# Patient Record
Sex: MALE | Race: WHITE | NOT HISPANIC OR LATINO | Employment: OTHER | URBAN - METROPOLITAN AREA
[De-identification: names, ages, dates, MRNs, and addresses within clinical notes are randomized per-mention and may not be internally consistent; named-entity substitution may affect disease eponyms.]

---

## 2019-11-11 ENCOUNTER — TRANSCRIBE ORDERS (OUTPATIENT)
Dept: ADMINISTRATIVE | Facility: HOSPITAL | Age: 84
End: 2019-11-11

## 2019-11-11 DIAGNOSIS — C34.90 MALIGNANT NEOPLASM OF LUNG, UNSPECIFIED LATERALITY, UNSPECIFIED PART OF LUNG (HCC): Primary | ICD-10-CM

## 2019-11-19 ENCOUNTER — HOSPITAL ENCOUNTER (OUTPATIENT)
Dept: RADIOLOGY | Age: 84
Discharge: HOME | End: 2019-11-19
Payer: MEDICARE

## 2019-11-19 DIAGNOSIS — C77.9 METASTATIC MALIGNANT NEOPLASM TO REGIONAL LYMPH NODE (HCC): ICD-10-CM

## 2019-11-19 DIAGNOSIS — D38.1 NEOPLASM OF UNCERTAIN BEHAVIOR OF RIGHT UPPER LOBE OF LUNG: ICD-10-CM

## 2019-11-19 LAB — GLUCOSE SERPL-MCNC: 90 MG/DL (ref 65–140)

## 2019-11-19 PROCEDURE — A9552 F18 FDG: HCPCS

## 2019-11-19 PROCEDURE — 78815 PET IMAGE W/CT SKULL-THIGH: CPT

## 2019-11-19 PROCEDURE — 82948 REAGENT STRIP/BLOOD GLUCOSE: CPT

## 2020-01-01 ENCOUNTER — APPOINTMENT (EMERGENCY)
Dept: CT IMAGING | Facility: HOSPITAL | Age: 85
End: 2020-01-01
Payer: MEDICARE

## 2020-01-01 ENCOUNTER — TELEPHONE (OUTPATIENT)
Dept: ADMINISTRATIVE | Facility: HOSPITAL | Age: 85
End: 2020-01-01

## 2020-01-01 ENCOUNTER — OFFICE VISIT (OUTPATIENT)
Dept: VASCULAR SURGERY | Facility: CLINIC | Age: 85
End: 2020-01-01
Payer: MEDICARE

## 2020-01-01 ENCOUNTER — ANESTHESIA (OUTPATIENT)
Dept: PERIOP | Facility: HOSPITAL | Age: 85
End: 2020-01-01
Payer: MEDICARE

## 2020-01-01 ENCOUNTER — HOSPITAL ENCOUNTER (OUTPATIENT)
Facility: HOSPITAL | Age: 85
Setting detail: OUTPATIENT SURGERY
Discharge: HOME | End: 2020-11-04
Attending: SURGERY | Admitting: SURGERY
Payer: MEDICARE

## 2020-01-01 ENCOUNTER — HOSPITAL ENCOUNTER (OUTPATIENT)
Dept: NON INVASIVE DIAGNOSTICS | Facility: CLINIC | Age: 85
Discharge: HOME | End: 2020-10-21
Payer: MEDICARE

## 2020-01-01 ENCOUNTER — TELEPHONE (OUTPATIENT)
Dept: UROLOGY | Facility: MEDICAL CENTER | Age: 85
End: 2020-01-01

## 2020-01-01 ENCOUNTER — PREP FOR PROCEDURE (OUTPATIENT)
Dept: VASCULAR SURGERY | Facility: CLINIC | Age: 85
End: 2020-01-01

## 2020-01-01 ENCOUNTER — TELEPHONE (OUTPATIENT)
Dept: VASCULAR SURGERY | Facility: CLINIC | Age: 85
End: 2020-01-01

## 2020-01-01 ENCOUNTER — HOSPITAL ENCOUNTER (OUTPATIENT)
Dept: VASCULAR ULTRASOUND | Facility: HOSPITAL | Age: 85
Discharge: HOME | End: 2020-12-22
Payer: MEDICARE

## 2020-01-01 ENCOUNTER — ANESTHESIA EVENT (OUTPATIENT)
Dept: PERIOP | Facility: HOSPITAL | Age: 85
End: 2020-01-01
Payer: MEDICARE

## 2020-01-01 ENCOUNTER — APPOINTMENT (OUTPATIENT)
Dept: LAB | Facility: CLINIC | Age: 85
End: 2020-01-01
Payer: MEDICARE

## 2020-01-01 ENCOUNTER — HOSPITAL ENCOUNTER (EMERGENCY)
Facility: HOSPITAL | Age: 85
Discharge: HOME | End: 2020-12-27
Attending: EMERGENCY MEDICINE
Payer: MEDICARE

## 2020-01-01 ENCOUNTER — APPOINTMENT (OUTPATIENT)
Dept: RADIOLOGY | Facility: HOSPITAL | Age: 85
End: 2020-01-01
Payer: MEDICARE

## 2020-01-01 VITALS
DIASTOLIC BLOOD PRESSURE: 81 MMHG | SYSTOLIC BLOOD PRESSURE: 132 MMHG | RESPIRATION RATE: 18 BRPM | OXYGEN SATURATION: 100 % | HEART RATE: 68 BPM | TEMPERATURE: 97.4 F | BODY MASS INDEX: 24.42 KG/M2 | WEIGHT: 165.34 LBS

## 2020-01-01 VITALS
BODY MASS INDEX: 24.29 KG/M2 | SYSTOLIC BLOOD PRESSURE: 138 MMHG | OXYGEN SATURATION: 99 % | WEIGHT: 164 LBS | DIASTOLIC BLOOD PRESSURE: 78 MMHG | HEART RATE: 75 BPM | RESPIRATION RATE: 16 BRPM | TEMPERATURE: 98 F | HEIGHT: 69 IN

## 2020-01-01 VITALS — HEART RATE: 56 BPM

## 2020-01-01 VITALS
BODY MASS INDEX: 24.44 KG/M2 | SYSTOLIC BLOOD PRESSURE: 158 MMHG | HEIGHT: 69 IN | RESPIRATION RATE: 18 BRPM | TEMPERATURE: 97.8 F | HEART RATE: 80 BPM | DIASTOLIC BLOOD PRESSURE: 90 MMHG | WEIGHT: 165 LBS

## 2020-01-01 VITALS
BODY MASS INDEX: 24.59 KG/M2 | HEIGHT: 69 IN | RESPIRATION RATE: 18 BRPM | HEART RATE: 69 BPM | DIASTOLIC BLOOD PRESSURE: 92 MMHG | TEMPERATURE: 98.5 F | WEIGHT: 166 LBS | SYSTOLIC BLOOD PRESSURE: 140 MMHG

## 2020-01-01 VITALS
DIASTOLIC BLOOD PRESSURE: 103 MMHG | BODY MASS INDEX: 23.7 KG/M2 | HEIGHT: 69 IN | HEART RATE: 64 BPM | TEMPERATURE: 97.5 F | RESPIRATION RATE: 18 BRPM | OXYGEN SATURATION: 98 % | WEIGHT: 160 LBS | SYSTOLIC BLOOD PRESSURE: 164 MMHG

## 2020-01-01 DIAGNOSIS — I70.213 ATHEROSCLEROSIS OF NATIVE ARTERY OF BOTH LOWER EXTREMITIES WITH INTERMITTENT CLAUDICATION (HCC): Primary | ICD-10-CM

## 2020-01-01 DIAGNOSIS — I65.23 ASYMPTOMATIC BILATERAL CAROTID ARTERY STENOSIS: ICD-10-CM

## 2020-01-01 DIAGNOSIS — I71.4 ABDOMINAL AORTIC ANEURYSM (AAA) WITHOUT RUPTURE (HCC): ICD-10-CM

## 2020-01-01 DIAGNOSIS — I87.2 VENOUS INSUFFICIENCY: ICD-10-CM

## 2020-01-01 DIAGNOSIS — Z46.6 ENCOUNTER FOR URINARY CATHETER: ICD-10-CM

## 2020-01-01 DIAGNOSIS — E78.2 MIXED HYPERLIPIDEMIA: ICD-10-CM

## 2020-01-01 DIAGNOSIS — R31.0 GROSS HEMATURIA: Primary | ICD-10-CM

## 2020-01-01 DIAGNOSIS — I48.21 PERMANENT ATRIAL FIBRILLATION (HCC): ICD-10-CM

## 2020-01-01 DIAGNOSIS — I70.213 ATHEROSCLEROSIS OF NATIVE ARTERY OF BOTH LOWER EXTREMITIES WITH INTERMITTENT CLAUDICATION (HCC): ICD-10-CM

## 2020-01-01 DIAGNOSIS — I48.21 PERMANENT ATRIAL FIBRILLATION (HCC): Primary | ICD-10-CM

## 2020-01-01 DIAGNOSIS — R33.9 URINARY RETENTION: ICD-10-CM

## 2020-01-01 DIAGNOSIS — R31.9 HEMATURIA: Primary | ICD-10-CM

## 2020-01-01 DIAGNOSIS — N32.89 BLADDER DISTENSION: ICD-10-CM

## 2020-01-01 LAB
ALBUMIN SERPL BCP-MCNC: 3.6 G/DL (ref 3.5–5)
ALP SERPL-CCNC: 78 U/L (ref 46–116)
ALT SERPL W P-5'-P-CCNC: 23 U/L (ref 12–78)
ANION GAP SERPL CALCULATED.3IONS-SCNC: 7 MMOL/L (ref 4–13)
ANION GAP SERPL CALCULATED.3IONS-SCNC: 8 MMOL/L (ref 4–13)
APTT PPP: 50 SECONDS (ref 23–37)
AST SERPL W P-5'-P-CCNC: 21 U/L (ref 5–45)
BACTERIA UR QL AUTO: ABNORMAL /HPF
BASOPHILS # BLD AUTO: 0.05 THOUSANDS/ΜL (ref 0–0.1)
BASOPHILS NFR BLD AUTO: 1 % (ref 0–1)
BILIRUB SERPL-MCNC: 0.52 MG/DL (ref 0.2–1)
BILIRUB UR QL STRIP: ABNORMAL
BILIRUB UR QL STRIP: ABNORMAL
BUN SERPL-MCNC: 18 MG/DL (ref 5–25)
BUN SERPL-MCNC: 24 MG/DL (ref 5–25)
CALCIUM SERPL-MCNC: 9.1 MG/DL (ref 8.3–10.1)
CALCIUM SERPL-MCNC: 9.1 MG/DL (ref 8.3–10.1)
CHLORIDE SERPL-SCNC: 102 MMOL/L (ref 100–108)
CHLORIDE SERPL-SCNC: 104 MMOL/L (ref 100–108)
CK MB SERPL-MCNC: 2.5 % (ref 0–2.5)
CK MB SERPL-MCNC: 5.5 NG/ML (ref 0–5)
CK SERPL-CCNC: 218 U/L (ref 39–308)
CLARITY UR: ABNORMAL
CLARITY UR: CLEAR
CO2 SERPL-SCNC: 27 MMOL/L (ref 21–32)
CO2 SERPL-SCNC: 28 MMOL/L (ref 21–32)
COLOR UR: ABNORMAL
COLOR UR: YELLOW
CREAT SERPL-MCNC: 0.96 MG/DL (ref 0.6–1.3)
CREAT SERPL-MCNC: 1.1 MG/DL (ref 0.6–1.3)
EOSINOPHIL # BLD AUTO: 0.16 THOUSAND/ΜL (ref 0–0.61)
EOSINOPHIL NFR BLD AUTO: 3 % (ref 0–6)
ERYTHROCYTE [DISTWIDTH] IN BLOOD BY AUTOMATED COUNT: 13.6 % (ref 11.6–15.1)
ERYTHROCYTE [DISTWIDTH] IN BLOOD BY AUTOMATED COUNT: 14.3 % (ref 11.6–15.1)
GFR SERPL CREATININE-BSD FRML MDRD: 60 ML/MIN/1.73SQ M
GFR SERPL CREATININE-BSD FRML MDRD: 72 ML/MIN/1.73SQ M
GLUCOSE SERPL-MCNC: 100 MG/DL (ref 65–140)
GLUCOSE SERPL-MCNC: 123 MG/DL (ref 65–140)
GLUCOSE SERPL-MCNC: 55 MG/DL (ref 65–140)
GLUCOSE SERPL-MCNC: 96 MG/DL (ref 65–140)
GLUCOSE UR STRIP-MCNC: NEGATIVE MG/DL
GLUCOSE UR STRIP-MCNC: NEGATIVE MG/DL
HCT VFR BLD AUTO: 36.4 % (ref 36.5–49.3)
HCT VFR BLD AUTO: 38.5 % (ref 36.5–49.3)
HGB BLD-MCNC: 12.3 G/DL (ref 12–17)
HGB BLD-MCNC: 12.9 G/DL (ref 12–17)
HGB UR QL STRIP.AUTO: ABNORMAL
HGB UR QL STRIP.AUTO: ABNORMAL
IMM GRANULOCYTES # BLD AUTO: 0.01 THOUSAND/UL (ref 0–0.2)
IMM GRANULOCYTES NFR BLD AUTO: 0 % (ref 0–2)
INR PPP: 1.19 (ref 0.84–1.19)
KETONES UR STRIP-MCNC: NEGATIVE MG/DL
KETONES UR STRIP-MCNC: NEGATIVE MG/DL
LEUKOCYTE ESTERASE UR QL STRIP: NEGATIVE
LEUKOCYTE ESTERASE UR QL STRIP: NEGATIVE
LIPASE SERPL-CCNC: 132 U/L (ref 73–393)
LYMPHOCYTES # BLD AUTO: 1.41 THOUSANDS/ΜL (ref 0.6–4.47)
LYMPHOCYTES NFR BLD AUTO: 22 % (ref 14–44)
MCH RBC QN AUTO: 33.1 PG (ref 26.8–34.3)
MCH RBC QN AUTO: 33.5 PG (ref 26.8–34.3)
MCHC RBC AUTO-ENTMCNC: 33.5 G/DL (ref 31.4–37.4)
MCHC RBC AUTO-ENTMCNC: 33.8 G/DL (ref 31.4–37.4)
MCV RBC AUTO: 100 FL (ref 82–98)
MCV RBC AUTO: 98 FL (ref 82–98)
MONOCYTES # BLD AUTO: 0.64 THOUSAND/ΜL (ref 0.17–1.22)
MONOCYTES NFR BLD AUTO: 10 % (ref 4–12)
NEUTROPHILS # BLD AUTO: 4.1 THOUSANDS/ΜL (ref 1.85–7.62)
NEUTS SEG NFR BLD AUTO: 64 % (ref 43–75)
NITRITE UR QL STRIP: NEGATIVE
NITRITE UR QL STRIP: NEGATIVE
NON-SQ EPI CELLS URNS QL MICRO: ABNORMAL /HPF
NRBC BLD AUTO-RTO: 0 /100 WBCS
PH UR STRIP.AUTO: 7.5 [PH] (ref 4.5–8)
PH UR STRIP.AUTO: 7.5 [PH] (ref 4.5–8)
PLATELET # BLD AUTO: 136 THOUSANDS/UL (ref 149–390)
PLATELET # BLD AUTO: 222 THOUSANDS/UL (ref 149–390)
PMV BLD AUTO: 10.5 FL (ref 8.9–12.7)
PMV BLD AUTO: 9.5 FL (ref 8.9–12.7)
POTASSIUM SERPL-SCNC: 4.1 MMOL/L (ref 3.5–5.3)
POTASSIUM SERPL-SCNC: 4.1 MMOL/L (ref 3.5–5.3)
PROT SERPL-MCNC: 6.9 G/DL (ref 6.4–8.2)
PROT UR STRIP-MCNC: ABNORMAL MG/DL
PROT UR STRIP-MCNC: ABNORMAL MG/DL
PROTHROMBIN TIME: 15.2 SECONDS (ref 11.6–14.5)
RBC # BLD AUTO: 3.72 MILLION/UL (ref 3.88–5.62)
RBC # BLD AUTO: 3.85 MILLION/UL (ref 3.88–5.62)
RBC #/AREA URNS AUTO: ABNORMAL /HPF
SODIUM SERPL-SCNC: 137 MMOL/L (ref 136–145)
SODIUM SERPL-SCNC: 139 MMOL/L (ref 136–145)
SP GR UR STRIP.AUTO: 1.02 (ref 1–1.03)
SP GR UR STRIP.AUTO: 1.02 (ref 1–1.03)
UROBILINOGEN UR QL STRIP.AUTO: 0.2 E.U./DL
UROBILINOGEN UR QL STRIP.AUTO: 0.2 E.U./DL
WBC # BLD AUTO: 5 THOUSAND/UL (ref 4.31–10.16)
WBC # BLD AUTO: 6.37 THOUSAND/UL (ref 4.31–10.16)
WBC #/AREA URNS AUTO: ABNORMAL /HPF

## 2020-01-01 PROCEDURE — 93978 VASCULAR STUDY: CPT | Performed by: SURGERY

## 2020-01-01 PROCEDURE — 93926 LOWER EXTREMITY STUDY: CPT | Performed by: SURGERY

## 2020-01-01 PROCEDURE — C1725 CATH, TRANSLUMIN NON-LASER: HCPCS | Performed by: SURGERY

## 2020-01-01 PROCEDURE — 82553 CREATINE MB FRACTION: CPT | Performed by: PHYSICIAN ASSISTANT

## 2020-01-01 PROCEDURE — 99285 EMERGENCY DEPT VISIT HI MDM: CPT | Performed by: PHYSICIAN ASSISTANT

## 2020-01-01 PROCEDURE — G1004 CDSM NDSC: HCPCS

## 2020-01-01 PROCEDURE — 36415 COLL VENOUS BLD VENIPUNCTURE: CPT | Performed by: PHYSICIAN ASSISTANT

## 2020-01-01 PROCEDURE — 75710 ARTERY X-RAYS ARM/LEG: CPT

## 2020-01-01 PROCEDURE — C1769 GUIDE WIRE: HCPCS | Performed by: SURGERY

## 2020-01-01 PROCEDURE — 93880 EXTRACRANIAL BILAT STUDY: CPT | Performed by: SURGERY

## 2020-01-01 PROCEDURE — 85730 THROMBOPLASTIN TIME PARTIAL: CPT | Performed by: PHYSICIAN ASSISTANT

## 2020-01-01 PROCEDURE — 75710 ARTERY X-RAYS ARM/LEG: CPT | Performed by: SURGERY

## 2020-01-01 PROCEDURE — 76937 US GUIDE VASCULAR ACCESS: CPT | Performed by: SURGERY

## 2020-01-01 PROCEDURE — 80053 COMPREHEN METABOLIC PANEL: CPT | Performed by: PHYSICIAN ASSISTANT

## 2020-01-01 PROCEDURE — C1760 CLOSURE DEV, VASC: HCPCS | Performed by: SURGERY

## 2020-01-01 PROCEDURE — 99283 EMERGENCY DEPT VISIT LOW MDM: CPT

## 2020-01-01 PROCEDURE — 81001 URINALYSIS AUTO W/SCOPE: CPT

## 2020-01-01 PROCEDURE — C1887 CATHETER, GUIDING: HCPCS | Performed by: SURGERY

## 2020-01-01 PROCEDURE — C1725 CATH, TRANSLUMIN NON-LASER: HCPCS

## 2020-01-01 PROCEDURE — 85610 PROTHROMBIN TIME: CPT | Performed by: PHYSICIAN ASSISTANT

## 2020-01-01 PROCEDURE — 93978 VASCULAR STUDY: CPT

## 2020-01-01 PROCEDURE — C1894 INTRO/SHEATH, NON-LASER: HCPCS | Performed by: SURGERY

## 2020-01-01 PROCEDURE — 36415 COLL VENOUS BLD VENIPUNCTURE: CPT

## 2020-01-01 PROCEDURE — 82550 ASSAY OF CK (CPK): CPT | Performed by: PHYSICIAN ASSISTANT

## 2020-01-01 PROCEDURE — 93880 EXTRACRANIAL BILAT STUDY: CPT

## 2020-01-01 PROCEDURE — 85025 COMPLETE CBC W/AUTO DIFF WBC: CPT | Performed by: PHYSICIAN ASSISTANT

## 2020-01-01 PROCEDURE — 37224 PR REVSC OPN/PRG FEM/POP W/ANGIOPLASTY UNI: CPT | Performed by: SURGERY

## 2020-01-01 PROCEDURE — G9198 NO ORDER FOR CEPH NO REASON: HCPCS | Performed by: SURGERY

## 2020-01-01 PROCEDURE — 99215 OFFICE O/P EST HI 40 MIN: CPT | Performed by: SURGERY

## 2020-01-01 PROCEDURE — 85027 COMPLETE CBC AUTOMATED: CPT

## 2020-01-01 PROCEDURE — 74176 CT ABD & PELVIS W/O CONTRAST: CPT

## 2020-01-01 PROCEDURE — 83690 ASSAY OF LIPASE: CPT | Performed by: PHYSICIAN ASSISTANT

## 2020-01-01 PROCEDURE — 80048 BASIC METABOLIC PNL TOTAL CA: CPT

## 2020-01-01 PROCEDURE — 99284 EMERGENCY DEPT VISIT MOD MDM: CPT

## 2020-01-01 PROCEDURE — 99282 EMERGENCY DEPT VISIT SF MDM: CPT | Performed by: EMERGENCY MEDICINE

## 2020-01-01 PROCEDURE — 51798 US URINE CAPACITY MEASURE: CPT

## 2020-01-01 PROCEDURE — 93926 LOWER EXTREMITY STUDY: CPT

## 2020-01-01 PROCEDURE — 82948 REAGENT STRIP/BLOOD GLUCOSE: CPT

## 2020-01-01 DEVICE — DEVICE CLOSURE MYNX CONTROL 6F/7FR: Type: IMPLANTABLE DEVICE | Status: FUNCTIONAL

## 2020-01-01 RX ORDER — CEPHALEXIN 500 MG/1
500 CAPSULE ORAL EVERY 6 HOURS SCHEDULED
Qty: 28 CAPSULE | Refills: 0 | Status: SHIPPED | OUTPATIENT
Start: 2020-01-01 | End: 2021-01-01

## 2020-01-01 RX ORDER — LIDOCAINE HYDROCHLORIDE 10 MG/ML
0.5 INJECTION, SOLUTION EPIDURAL; INFILTRATION; INTRACAUDAL; PERINEURAL ONCE AS NEEDED
Status: DISCONTINUED | OUTPATIENT
Start: 2020-01-01 | End: 2020-01-01 | Stop reason: HOSPADM

## 2020-01-01 RX ORDER — PROPOFOL 10 MG/ML
INJECTION, EMULSION INTRAVENOUS AS NEEDED
Status: DISCONTINUED | OUTPATIENT
Start: 2020-01-01 | End: 2020-01-01

## 2020-01-01 RX ORDER — SODIUM CHLORIDE, SODIUM LACTATE, POTASSIUM CHLORIDE, CALCIUM CHLORIDE 600; 310; 30; 20 MG/100ML; MG/100ML; MG/100ML; MG/100ML
50 INJECTION, SOLUTION INTRAVENOUS CONTINUOUS
Status: DISCONTINUED | OUTPATIENT
Start: 2020-01-01 | End: 2020-01-01 | Stop reason: HOSPADM

## 2020-01-01 RX ORDER — CEPHALEXIN 250 MG/1
500 CAPSULE ORAL ONCE
Status: COMPLETED | OUTPATIENT
Start: 2020-01-01 | End: 2020-01-01

## 2020-01-01 RX ORDER — ATORVASTATIN CALCIUM 40 MG/1
40 TABLET, FILM COATED ORAL DAILY
Qty: 90 TABLET | Refills: 4 | Status: SHIPPED | OUTPATIENT
Start: 2020-01-01 | End: 2021-01-01 | Stop reason: ALTCHOICE

## 2020-01-01 RX ORDER — SODIUM CHLORIDE 9 MG/ML
125 INJECTION, SOLUTION INTRAVENOUS CONTINUOUS
Status: DISCONTINUED | OUTPATIENT
Start: 2020-01-01 | End: 2020-01-01 | Stop reason: HOSPADM

## 2020-01-01 RX ORDER — LIDOCAINE HYDROCHLORIDE 20 MG/ML
1 JELLY TOPICAL ONCE
Status: COMPLETED | OUTPATIENT
Start: 2020-01-01 | End: 2020-01-01

## 2020-01-01 RX ORDER — MIDAZOLAM HYDROCHLORIDE 2 MG/2ML
INJECTION, SOLUTION INTRAMUSCULAR; INTRAVENOUS AS NEEDED
Status: DISCONTINUED | OUTPATIENT
Start: 2020-01-01 | End: 2020-01-01

## 2020-01-01 RX ORDER — FENTANYL CITRATE/PF 50 MCG/ML
12.5 SYRINGE (ML) INJECTION
Status: DISCONTINUED | OUTPATIENT
Start: 2020-01-01 | End: 2020-01-01 | Stop reason: HOSPADM

## 2020-01-01 RX ORDER — SODIUM CHLORIDE, SODIUM LACTATE, POTASSIUM CHLORIDE, CALCIUM CHLORIDE 600; 310; 30; 20 MG/100ML; MG/100ML; MG/100ML; MG/100ML
INJECTION, SOLUTION INTRAVENOUS CONTINUOUS PRN
Status: DISCONTINUED | OUTPATIENT
Start: 2020-01-01 | End: 2020-01-01

## 2020-01-01 RX ORDER — ONDANSETRON 2 MG/ML
4 INJECTION INTRAMUSCULAR; INTRAVENOUS ONCE AS NEEDED
Status: DISCONTINUED | OUTPATIENT
Start: 2020-01-01 | End: 2020-01-01 | Stop reason: HOSPADM

## 2020-01-01 RX ORDER — LIDOCAINE HYDROCHLORIDE 10 MG/ML
INJECTION, SOLUTION EPIDURAL; INFILTRATION; INTRACAUDAL; PERINEURAL AS NEEDED
Status: DISCONTINUED | OUTPATIENT
Start: 2020-01-01 | End: 2020-01-01 | Stop reason: HOSPADM

## 2020-01-01 RX ORDER — SODIUM CHLORIDE 9 MG/ML
125 INJECTION, SOLUTION INTRAVENOUS CONTINUOUS
Status: CANCELLED | OUTPATIENT
Start: 2020-01-01

## 2020-01-01 RX ORDER — FENTANYL CITRATE 50 UG/ML
INJECTION, SOLUTION INTRAMUSCULAR; INTRAVENOUS AS NEEDED
Status: DISCONTINUED | OUTPATIENT
Start: 2020-01-01 | End: 2020-01-01

## 2020-01-01 RX ORDER — OXYCODONE HYDROCHLORIDE AND ACETAMINOPHEN 5; 325 MG/1; MG/1
1 TABLET ORAL EVERY 4 HOURS PRN
Status: DISCONTINUED | OUTPATIENT
Start: 2020-01-01 | End: 2020-01-01 | Stop reason: HOSPADM

## 2020-01-01 RX ORDER — HEPARIN SODIUM 1000 [USP'U]/ML
INJECTION, SOLUTION INTRAVENOUS; SUBCUTANEOUS AS NEEDED
Status: DISCONTINUED | OUTPATIENT
Start: 2020-01-01 | End: 2020-01-01

## 2020-01-01 RX ADMIN — CEPHALEXIN 500 MG: 250 CAPSULE ORAL at 00:17

## 2020-01-01 RX ADMIN — SODIUM CHLORIDE 125 ML/HR: 0.9 INJECTION, SOLUTION INTRAVENOUS at 15:59

## 2020-01-01 RX ADMIN — HEPARIN SODIUM 5000 UNITS: 1000 INJECTION INTRAVENOUS; SUBCUTANEOUS at 13:40

## 2020-01-01 RX ADMIN — SODIUM CHLORIDE, SODIUM LACTATE, POTASSIUM CHLORIDE, AND CALCIUM CHLORIDE: .6; .31; .03; .02 INJECTION, SOLUTION INTRAVENOUS at 08:37

## 2020-01-01 RX ADMIN — MIDAZOLAM 0.5 MG: 1 INJECTION INTRAMUSCULAR; INTRAVENOUS at 13:04

## 2020-01-01 RX ADMIN — MIDAZOLAM 0.5 MG: 1 INJECTION INTRAMUSCULAR; INTRAVENOUS at 12:53

## 2020-01-01 RX ADMIN — FENTANYL CITRATE 25 MCG: 50 INJECTION, SOLUTION INTRAMUSCULAR; INTRAVENOUS at 13:04

## 2020-01-01 RX ADMIN — LIDOCAINE HYDROCHLORIDE 1 APPLICATION: 20 JELLY TOPICAL at 01:14

## 2020-01-01 RX ADMIN — FENTANYL CITRATE 25 MCG: 50 INJECTION, SOLUTION INTRAMUSCULAR; INTRAVENOUS at 14:17

## 2020-01-01 RX ADMIN — PROPOFOL 20 MG: 10 INJECTION, EMULSION INTRAVENOUS at 13:26

## 2020-01-01 RX ADMIN — FENTANYL CITRATE 25 MCG: 50 INJECTION, SOLUTION INTRAMUSCULAR; INTRAVENOUS at 12:53

## 2020-01-01 RX ADMIN — FENTANYL CITRATE 25 MCG: 50 INJECTION, SOLUTION INTRAMUSCULAR; INTRAVENOUS at 14:33

## 2020-03-17 ENCOUNTER — OFFICE VISIT (OUTPATIENT)
Dept: VASCULAR SURGERY | Facility: CLINIC | Age: 85
End: 2020-03-17
Payer: MEDICARE

## 2020-03-17 VITALS
WEIGHT: 166 LBS | BODY MASS INDEX: 25.16 KG/M2 | RESPIRATION RATE: 16 BRPM | HEART RATE: 93 BPM | HEIGHT: 68 IN | DIASTOLIC BLOOD PRESSURE: 60 MMHG | SYSTOLIC BLOOD PRESSURE: 110 MMHG

## 2020-03-17 DIAGNOSIS — I73.9 PAD (PERIPHERAL ARTERY DISEASE) (HCC): ICD-10-CM

## 2020-03-17 DIAGNOSIS — I71.4 ABDOMINAL AORTIC ANEURYSM (AAA) WITHOUT RUPTURE (HCC): Primary | ICD-10-CM

## 2020-03-17 PROCEDURE — 99204 OFFICE O/P NEW MOD 45 MIN: CPT | Performed by: PHYSICIAN ASSISTANT

## 2020-03-17 RX ORDER — METOPROLOL SUCCINATE 25 MG/1
TABLET, EXTENDED RELEASE ORAL
COMMUNITY
Start: 2009-05-08 | End: 2020-03-17 | Stop reason: ALTCHOICE

## 2020-03-17 RX ORDER — WARFARIN SODIUM 1 MG/1
TABLET ORAL DAILY
COMMUNITY
End: 2020-03-17 | Stop reason: ALTCHOICE

## 2020-03-17 NOTE — PATIENT INSTRUCTIONS
Peripheral Artery Disease   WHAT YOU NEED TO KNOW:   What is peripheral artery disease? Peripheral artery disease (PAD) is narrow, weak, or blocked arteries  It may affect any arteries outside of your heart and brain  PAD is usually the result of a buildup of fat and cholesterol, also called plaque, along your artery walls  Inflammation, a blood clot, or abnormal cell growth could also block your arteries  PAD prevents normal blood flow to your legs and arms  You are at risk of an amputation if poor blood flow keeps wounds from healing or causes gangrene (tissue death)  Without treatment, PAD can also cause a heart attack or stroke  What increases my risk for PAD? · Smoking cigarettes     · Diabetes     · High blood pressure     · High cholesterol     · Age older than 40 years    · Heart disease or a family history of heart disease  What are the signs and symptoms of PAD? Mild PAD usually does not cause symptoms  As the disease worsens over time, you may have the following:  · Pain or cramps in your leg or hip while you walk     · A numb, weak, or heavy feeling in your legs     · Dry, scaly, red, or pale skin on your legs     · Thick or brittle nails, or hair loss on your arms and legs     · Foot sores that will not heal     · Burning or aching in your feet and toes while resting (this may be worse when you lie down)  How is PAD diagnosed? · Angiography  is a test that shows pictures of the arteries in your arms and legs  You will be given contrast liquid to help the arteries show up better on the pictures  The pictures will be taken with an MRI or CT scan  Tell the healthcare provider if you have ever had an allergic reaction to contrast liquid  Do not enter the MRI room with anything metal  Metal can cause serious injury  Tell a healthcare provider if you have any metal in or on your body      · Doppler ankle brachial index (BRITTANY)  is a test that compares blood pressure in your ankles to blood pressure in your arms  This tells your healthcare provider how well blood is flowing through the arteries in your legs  How is PAD treated? Treatment can help reduce your risk of a heart attack, stroke, or amputation  You may need more than one of the following:  · Medicines  may be given  Your healthcare provider may give you any of the following:     ¨ Antiplatelet medicine,  such as aspirin, helps prevent blood clots and reduces the risk of a heart attack or stroke  ¨ Statin medicine  helps lower your cholesterol and prevents your PAD from getting worse  · A supervised exercise program  helps you stay active in normal daily activities and may prevent disability  Healthcare providers will help you safely walk or do strength training exercises 3 times a week for 30 to 60 minutes  You will do this for several months, then transition to walking on your own  · Angioplasty  is a procedure to open your artery so blood can flow through normally  A thin tube called a catheter is used to insert a small balloon into your artery  The balloon is inflated to open your blocked artery, and then removed  A tube called a stent may be placed in your artery to hold it open  · Bypass surgery  is used to make a new connection to your artery with a vein from another part of your body, or an artificial graft  The vein or graft is attached to your artery above and below your blockage  This allows blood to flow around the blocked portion of your artery  How can I manage PAD? · Do not smoke  Nicotine and other chemicals in cigarettes and cigars can worsen PAD  They can also increase your risk for a heart attack or stroke  Ask your healthcare provider for information if you currently smoke and need help to quit  E-cigarettes or smokeless tobacco still contain nicotine  Talk to your healthcare provider before you use these products  · Manage other health conditions  Take your medicines as directed   Follow your healthcare provider's instructions if you have high blood pressure or high cholesterol  Perform foot care and check your blood sugar levels as directed if you have diabetes  · Eat heart healthy foods  Eat whole grains, fruits, and vegetables every day  Limit salt and high-fat foods  Ask your healthcare provider for more information on a heart healthy diet  Ask if you need to lose weight  Your healthcare provider can help you create a healthy weight-loss plan  Call 911 for the following:   · You have any of the following signs of a heart attack:      ¨ Squeezing, pressure, or pain in your chest that lasts longer than 5 minutes or returns    ¨ Discomfort or pain in your back, neck, jaw, stomach, or arm     ¨ Trouble breathing    ¨ Nausea or vomiting    ¨ Lightheadedness or a sudden cold sweat, especially with chest pain or trouble breathing    · You have any of the following signs of a stroke:      ¨ Numbness or drooping on one side of your face     ¨ Weakness in an arm or leg    ¨ Confusion or difficulty speaking    ¨ Dizziness, a severe headache, or vision loss  When should I seek immediate care? · You have sores or wounds that will not heal      · You notice black or discolored skin on your arm or leg  · Your skin is cool to the touch  When should I contact my healthcare provider? · You have leg pain when you walk 1/8 mile (200 meters) or less, even with treatment  · Your legs are red, dry, or pale, even with treatment  · You have questions or concerns about your condition or care  CARE AGREEMENT:   You have the right to help plan your care  Learn about your health condition and how it may be treated  Discuss treatment options with your caregivers to decide what care you want to receive  You always have the right to refuse treatment  The above information is an  only  It is not intended as medical advice for individual conditions or treatments   Talk to your doctor, nurse or pharmacist before following any medical regimen to see if it is safe and effective for you  © 2017 [employer] Information is for End User's use only and may not be sold, redistributed or otherwise used for commercial purposes  All illustrations and images included in CareNotes® are the copyrighted property of A D A M , Inc  or [employer]  Nonruptured Abdominal Aortic Aneurysm   WHAT YOU NEED TO KNOW:   What is an abdominal aortic aneurysm (AAA)? The aorta is a large blood vessel that extends from your heart to your abdomen  The part of the aorta that extends into your abdomen is called your abdominal aorta  Your abdominal aorta brings blood to your stomach, pelvis, and legs  An AAA is a bulging or weak area in your abdominal aorta  Over time, the bulge may grow and is at risk for tearing or rupturing  An AAA that ruptures is a life-threatening emergency  What increases my risk for an AAA? · High blood pressure or atherosclerosis (the buildup of fat and cholesterol in your arteries)    · Being overweight or obese    · Smoking    · A family or personal history of a AAA    · Age older than 61    · Being male    · A connective tissue disease such as Marfan syndrome  What are the signs and symptoms of a nonruptured AAA? An AAA usually does not have signs or symptoms if it has not ruptured  If the AAA starts to leak or ruptures, you may have any of the following:  · Sudden pain in your abdomen, groin, back, legs, or buttocks    · Nausea and vomiting    · A lump or swelling in your abdomen    · Stiff abdominal muscles    · Numbness or tingling in your legs    · Pale, sweaty, or clammy skin    · Dizziness, fainting or loss of consciousness  How is a nonruptured AAA diagnosed? An AAA is often found when you have a test or exam for another condition  If you have risk factors for an AAA, you may need an ultrasound, CT scan, MRI, or angiography   These tests can measure the size and location of your AAA  How is an AAA treated? Your AAA may not need treatment  Your healthcare provider may monitor the size of your AAA with tests, such as an ultrasound  You may be given medicines to prevent the AAA from growing  Examples include blood pressure medicine and medicine to lower your cholesterol  If your AAA gets bigger, starts to leak, or ruptures, you may need any of the following:  · Endovascular repair  is a procedure that uses a graft to repair your AAA  The graft stops blood flow to the aneurysm and protects your abdominal aorta  You may need to have more than 1 endovascular repair  · Open repair  is surgery to repair or remove an AAA  What can I do to manage a nonruptured AAA? You can help prevent your AAA from growing or rupturing by doing the following:  · Do not smoke  Nicotine and other chemicals in cigarettes and cigars can increase your blood pressure  It can also damage your aorta and increase the size of your AAA  Ask your healthcare provider for information if you currently smoke and need help to quit  E-cigarettes or smokeless tobacco still contain nicotine  Talk to your healthcare provider before you use these products  · Exercise as directed  Exercise can help control your blood pressure and cholesterol level  Ask your healthcare provider how much exercise you need each day and which exercises are best for you  · Follow the meal plan recommended by your healthcare provider  Talk to your dietitian about a heart-healthy or low-sodium eating plan  Meal plans will help you lower your cholesterol and blood pressure  They will also help you reach a healthy weight  · Do not lift anything heavier than 10 pounds  Heavy lifting can increase pressure in your abdominal aorta  This can increase your risk for a ruptured AAA  Call 911 or have someone else call for any of the following:   · You faint or lose consciousness  · You cannot be woken  When should I seek immediate care? The following signs or symptoms may mean the AAA is at risk of rupturing:  · You have sudden sharp pain in your abdomen, groin, back, legs, or buttocks  · You have nausea and vomiting  · You feel dizzy  · You have stiffness or swelling in your abdomen, or a lump in your abdomen  · You have numbness or tingling in your legs  · Your skin is pale, sweaty, or clammy  When should I contact my healthcare provider? · You have questions or concerns about your condition or care  CARE AGREEMENT:   You have the right to help plan your care  Learn about your health condition and how it may be treated  Discuss treatment options with your caregivers to decide what care you want to receive  You always have the right to refuse treatment  The above information is an  only  It is not intended as medical advice for individual conditions or treatments  Talk to your doctor, nurse or pharmacist before following any medical regimen to see if it is safe and effective for you  © 2017 [employer]  Information is for End User's use only and may not be sold, redistributed or otherwise used for commercial purposes  All illustrations and images included in CareNotes® are the copyrighted property of A D A M , Inc  or [employer]  -PET/CT in November 2019 demonstrated a 4 5 cm abdominal aortic aneurysm  Aneurysm remains below size criteria for elective repair  We will obtain updated abdominal ultrasound for surveillance and for workup of your leg pain  -recommend lower extremity arterial ultrasound to evaluate your arteries for any degree of blockage contributing to her leg pain  -return to office with surgeon after ultrasounds for re-evaluation and determination of treatment plan    -continue Xarelto related to atrial fibrillation  -please contact the office with new concerns or symptoms   -call 911 immediately for signs or symptoms of rupture (acute onset of abdominal or back pain)

## 2020-03-17 NOTE — ASSESSMENT & PLAN NOTE
Asymptomatic 4 5 cm infrarenal AAA  -identified on PET-CT 11/19/2019  -Review of past imaging demonstrates 4 4 cm infrarenal AAA on CT A/P w/contrast November '14 @ [facility] obtained due to small bowel obstruction   -No formal surveillance    Patient unaware of diagnosis  -asymptomatic without abdominal pain, back pain or thigh/buttocks claudication  -will obtain updated AOIL for re-evaluation and surveillance

## 2020-03-17 NOTE — ASSESSMENT & PLAN NOTE
42-year-old male former smoker with HTN, HLD, atrial fibrillation on Xarelto, s/p Afib ablation, untreated RUL NSCLC (adenocarcinoma, present 5 8 cm) w/hx routine Vitamin C infusions, small-bowel resection '14 for SBO w/ischemia, chronic venous insufficiency with asymptomatic BLE varicose veins, asymptomatic 4 5 cm infrarenal AAA and 1 block L calf claudication likely related to PAD  +nonpalpable LLE distal pulses w/mono/biphasic doppler signals  + bilateral femoral pulses  Claudication symptoms progressive over the last 2 years and now somewhat lifestyle limiting for patient  No rest pain or tissue loss  No previous vascular studies  -will obtain EMILY for evaluation  Will also obtain AOIL given previously identified 4 5 cm AAA  -continue Xarelto (atrial fibrillation)  -will discuss ASA and statin therapy at follow-up appointment  -return to office with surgeon after noninvasive imaging for re-evaluation  -instructed to contact the office with new symptoms, concerns, rest pain or tissue loss

## 2020-03-17 NOTE — PROGRESS NOTES
Assessment/Plan:    Abdominal aortic aneurysm (AAA) without rupture (HCC)  Asymptomatic 4 5 cm infrarenal AAA  -identified on PET-CT 11/19/2019  -Review of past imaging demonstrates 4 4 cm infrarenal AAA on CT A/P w/contrast November '14 @ [facility] obtained due to small bowel obstruction   -No formal surveillance  Patient unaware of diagnosis  -asymptomatic without abdominal pain, back pain or thigh/buttocks claudication  -will obtain updated AOIL for re-evaluation and surveillance      PAD (peripheral artery disease) [facility])  17-year-old male former smoker with HTN, HLD, atrial fibrillation on Xarelto, s/p Afib ablation, untreated RUL NSCLC (adenocarcinoma, present 5 8 cm) w/hx routine Vitamin C infusions, small-bowel resection '14 for SBO w/ischemia, chronic venous insufficiency with asymptomatic BLE varicose veins, asymptomatic 4 5 cm infrarenal AAA and 1 block L calf claudication likely related to PAD  +nonpalpable LLE distal pulses w/mono/biphasic doppler signals  + bilateral femoral pulses  Claudication symptoms progressive over the last 2 years and now somewhat lifestyle limiting for patient  No rest pain or tissue loss  No previous vascular studies  -will obtain EMILY for evaluation  Will also obtain AOIL given previously identified 4 5 cm AAA  -continue Xarelto (atrial fibrillation)  -will discuss ASA and statin therapy at follow-up appointment  -return to office with surgeon after noninvasive imaging for re-evaluation  -instructed to contact the office with new symptoms, concerns, rest pain or tissue loss  Diagnoses and all orders for this visit:    Abdominal aortic aneurysm (AAA) without rupture ([address] )  -     VAS abdominal aorta/iliacs; complete study; Future    PAD (peripheral artery disease) (HCC)  -     VAS lower limb arterial duplex, complete bilateral; Future    Other orders  -     Discontinue: warfarin (COUMADIN) 1 mg tablet; Daily  -     rivaroxaban (Xarelto) 20 mg tablet;  Xarelto 20 mg tablet  -     Discontinue: metoprolol succinate (Toprol XL) 25 mg 24 hr tablet; Take by mouth          Subjective:      Patient ID: Aliya Thompson is a 80 y o  male  Patient is new to our office referred by [facility]  Pt denies  numbness, tingling or coldness  Pt denies  discoloration or open wound  Pt states  cramping or burning pain when walking  Pt states he can walks 2 to 3 blocks before rest  Pt states he  rest 2 - 5 minutes   Pt  denies no  pain at night but, when  when elevating legs he feel the same pain  Pt is a former smoker Lt > Rt leg Pt is taking Xarelto  70-year-old male former smoker with HTN, HLD, atrial fibrillation on Xarelto, s/p Afib ablation, untreated RUL NSCLC since '14 (adenocarcinoma, present 5 8 cm) w/hx routine Vitamin C infusions, small-bowel resection '14 for SBO w/ischemia, chronic venous insufficiency with asymptomatic BLE varicose veins, and asymptomatic 4 5 cm infrarenal AAA identified on PET/CT '19 and CT imaging '14 (patient unaware of diagnosis) who is referred by [facility] for evaluation of 1 block L calf claudication  Patient previously was an avid walker but over the last 2 years has noted progressive left calf claudication now present after walking 1 block and relieved with rest   Patient denies buttocks/thigh claudication, rest pain or tissue loss  No previous vascular studies for review  Patient denies prior vascular intervention  PET/CT @ [facility] reviewed and demonstrates 4 5 cm infrarenal AAA  Prior imaging studies reviewed from [facility] which note a 4 4 cm infrarenal AAA on CT abdomen/pelvis with contrast on 11/25/2014  No reference of AAA on abdominal ultrasound at [address] 5/20/2015  Patient on no aspirin or statin therapy  Patient only taking Xarelto related to his known atrial fibrillation        The following portions of the patient's history were reviewed and updated as appropriate: allergies, current medications, past family history, past medical history, past social history, past surgical history and problem list     Review of Systems   Constitutional: Negative  HENT: Positive for postnasal drip  Eyes: Negative  Respiratory: Negative  Cardiovascular: Positive for leg swelling  Gastrointestinal: Negative  Endocrine: Negative  Genitourinary: Negative  Musculoskeletal:        Pain when walking   Skin: Negative  Allergic/Immunologic: Negative  Neurological: Negative  Hematological: Negative  Psychiatric/Behavioral: Negative  I have reviewed and made appropriate changes to the review of systems input by the medical assistant      Vitals:    03/17/20 1135   BP: 110/60   BP Location: Left arm   Patient Position: Sitting   Pulse: 93   Resp: 16   Weight: 75 3 kg (166 lb)   Height: 5' 8" (1 727 m)       Patient Active Problem List   Diagnosis    Abdominal aortic aneurysm (AAA) without rupture (HCC)    PAD (peripheral artery disease) (HCC)       Past Surgical History:   Procedure Laterality Date    CARDIAC SURGERY         Family History   Problem Relation Age of Onset    Alzheimer's disease Mother     Cancer Father        Social History     Socioeconomic History    Marital status: Single     Spouse name: Not on file    Number of children: Not on file    Years of education: Not on file    Highest education level: Not on file   Occupational History    Not on file   Social Needs    Financial resource strain: Not on file    Food insecurity:     Worry: Not on file     Inability: Not on file    Transportation needs:     Medical: Not on file     Non-medical: Not on file   Tobacco Use    Smoking status: Former Smoker   Substance and Sexual Activity    Alcohol use: Not on file    Drug use: Not on file    Sexual activity: Not on file   Lifestyle    Physical activity:     Days per week: Not on file     Minutes per session: Not on file    Stress: Not on file   Relationships    Social connections:     Talks on phone: Not on file Gets together: Not on file     Attends Shinto service: Not on file     Active member of club or organization: Not on file     Attends meetings of clubs or organizations: Not on file     Relationship status: Not on file    Intimate partner violence:     Fear of current or ex partner: Not on file     Emotionally abused: Not on file     Physically abused: Not on file     Forced sexual activity: Not on file   Other Topics Concern    Not on file   Social History Narrative    Not on file       Allergies   Allergen Reactions    Protamine      Other reaction(s): Other (See Comments)  Did not work for patient      Sulfa Antibiotics      Other reaction(s): Other (See Comments)  SOB           Current Outpatient Medications:     rivaroxaban (Xarelto) 20 mg tablet, Xarelto 20 mg tablet, Disp: , Rfl:     Objective:  Imaging study:  No vascular imaging studies for review    /60 (BP Location: Left arm, Patient Position: Sitting)   Pulse 93   Resp 16   Ht 5' 8" (1 727 m)   Wt 75 3 kg (166 lb)   BMI 25 24 kg/m²          Physical Exam   Constitutional: He is oriented to person, place, and time  He appears well-developed and well-nourished  No distress  HENT:   Head: Normocephalic and atraumatic  Eyes: Pupils are equal, round, and reactive to light  Conjunctivae and EOM are normal  No scleral icterus  Neck: Normal range of motion  Neck supple  No JVD present  Carotid bruit is not present  No tracheal deviation present  No thyromegaly present  Cardiovascular: Normal rate, regular rhythm, S1 normal and S2 normal  Exam reveals no gallop, no S3 and no friction rub  No murmur heard  Pulses:       Carotid pulses are 2+ on the right side, and 2+ on the left side  Radial pulses are 2+ on the right side, and 2+ on the left side  Femoral pulses are 2+ on the right side, and 2+ on the left side  Popliteal pulses are 1+ on the right side, and 0 on the left side          Dorsalis pedis pulses are 2+ on the right side, and 0 on the left side  Posterior tibial pulses are 0 on the right side, and 0 on the left side  Bilateral lower extremities warm, pink, motor and sensory intact and well perfused without rubor, cyanosis, pallor   Pulmonary/Chest: Breath sounds normal  No stridor  No respiratory distress  He has no wheezes  He has no rhonchi  He has no rales  Abdominal: Soft  Bowel sounds are normal  He exhibits no distension, no abdominal bruit and no mass  There is no hepatosplenomegaly  There is no tenderness  There is no rebound  Prominent aortic pulsation noted at midline   Musculoskeletal: Normal range of motion  He exhibits no edema or deformity  Neurological: He is alert and oriented to person, place, and time  He has normal strength  Skin: Skin is warm, dry and intact  No lesion noted  No cyanosis or erythema  No pallor  Psychiatric: He has a normal mood and affect  Nursing note and vitals reviewed

## 2020-05-15 ENCOUNTER — TELEPHONE (OUTPATIENT)
Dept: VASCULAR SURGERY | Facility: CLINIC | Age: 85
End: 2020-05-15

## 2020-05-18 PROBLEM — I87.2 VENOUS INSUFFICIENCY: Status: ACTIVE | Noted: 2018-07-18

## 2020-05-18 PROBLEM — D18.03 HEMANGIOMA OF LIVER: Status: ACTIVE | Noted: 2020-05-18

## 2020-05-18 PROBLEM — C34.11 MALIGNANT NEOPLASM OF UPPER LOBE OF RIGHT LUNG (HCC): Status: ACTIVE | Noted: 2020-05-18

## 2020-05-19 ENCOUNTER — OFFICE VISIT (OUTPATIENT)
Dept: VASCULAR SURGERY | Facility: CLINIC | Age: 85
End: 2020-05-19
Payer: MEDICARE

## 2020-05-19 VITALS
HEART RATE: 64 BPM | WEIGHT: 165 LBS | DIASTOLIC BLOOD PRESSURE: 84 MMHG | RESPIRATION RATE: 16 BRPM | HEIGHT: 68 IN | SYSTOLIC BLOOD PRESSURE: 130 MMHG | BODY MASS INDEX: 25.01 KG/M2

## 2020-05-19 DIAGNOSIS — I71.4 ABDOMINAL AORTIC ANEURYSM (AAA) WITHOUT RUPTURE (HCC): ICD-10-CM

## 2020-05-19 DIAGNOSIS — E78.2 MIXED HYPERLIPIDEMIA: ICD-10-CM

## 2020-05-19 DIAGNOSIS — I87.2 VENOUS INSUFFICIENCY: ICD-10-CM

## 2020-05-19 DIAGNOSIS — I48.21 PERMANENT ATRIAL FIBRILLATION (HCC): ICD-10-CM

## 2020-05-19 DIAGNOSIS — C34.11 MALIGNANT NEOPLASM OF UPPER LOBE OF RIGHT LUNG (HCC): ICD-10-CM

## 2020-05-19 DIAGNOSIS — I10 BENIGN ESSENTIAL HYPERTENSION: ICD-10-CM

## 2020-05-19 DIAGNOSIS — I73.9 PAD (PERIPHERAL ARTERY DISEASE) (HCC): Primary | ICD-10-CM

## 2020-05-19 PROCEDURE — 99214 OFFICE O/P EST MOD 30 MIN: CPT | Performed by: PHYSICIAN ASSISTANT

## 2020-05-20 ENCOUNTER — PREP FOR PROCEDURE (OUTPATIENT)
Dept: VASCULAR SURGERY | Facility: CLINIC | Age: 85
End: 2020-05-20

## 2020-05-20 ENCOUNTER — TELEPHONE (OUTPATIENT)
Dept: VASCULAR SURGERY | Facility: CLINIC | Age: 85
End: 2020-05-20

## 2020-05-20 DIAGNOSIS — Z11.59 SCREENING FOR VIRAL DISEASE: Primary | ICD-10-CM

## 2020-05-21 ENCOUNTER — APPOINTMENT (OUTPATIENT)
Dept: LAB | Age: 85
End: 2020-05-21
Payer: MEDICARE

## 2020-05-21 ENCOUNTER — TELEPHONE (OUTPATIENT)
Dept: RADIOLOGY | Facility: HOSPITAL | Age: 85
End: 2020-05-21

## 2020-05-21 ENCOUNTER — TRANSCRIBE ORDERS (OUTPATIENT)
Dept: ADMINISTRATIVE | Age: 85
End: 2020-05-21

## 2020-05-21 DIAGNOSIS — I73.9 PAD (PERIPHERAL ARTERY DISEASE) (HCC): ICD-10-CM

## 2020-05-21 DIAGNOSIS — Z11.59 SCREENING FOR VIRAL DISEASE: ICD-10-CM

## 2020-05-21 LAB
ANION GAP SERPL CALCULATED.3IONS-SCNC: 7 MMOL/L (ref 4–13)
APTT PPP: 39 SECONDS (ref 23–37)
BUN SERPL-MCNC: 22 MG/DL (ref 5–25)
CALCIUM SERPL-MCNC: 9.3 MG/DL (ref 8.3–10.1)
CHLORIDE SERPL-SCNC: 102 MMOL/L (ref 100–108)
CO2 SERPL-SCNC: 27 MMOL/L (ref 21–32)
CREAT SERPL-MCNC: 0.83 MG/DL (ref 0.6–1.3)
ERYTHROCYTE [DISTWIDTH] IN BLOOD BY AUTOMATED COUNT: 13.9 % (ref 11.6–15.1)
GFR SERPL CREATININE-BSD FRML MDRD: 80 ML/MIN/1.73SQ M
GLUCOSE SERPL-MCNC: 89 MG/DL (ref 65–140)
HCT VFR BLD AUTO: 41 % (ref 36.5–49.3)
HGB BLD-MCNC: 14 G/DL (ref 12–17)
INR PPP: 1.47 (ref 0.84–1.19)
MCH RBC QN AUTO: 34.1 PG (ref 26.8–34.3)
MCHC RBC AUTO-ENTMCNC: 34.1 G/DL (ref 31.4–37.4)
MCV RBC AUTO: 100 FL (ref 82–98)
PLATELET # BLD AUTO: 182 THOUSANDS/UL (ref 149–390)
PMV BLD AUTO: 11.3 FL (ref 8.9–12.7)
POTASSIUM SERPL-SCNC: 3.9 MMOL/L (ref 3.5–5.3)
PROTHROMBIN TIME: 17.4 SECONDS (ref 11.6–14.5)
RBC # BLD AUTO: 4.11 MILLION/UL (ref 3.88–5.62)
SODIUM SERPL-SCNC: 136 MMOL/L (ref 136–145)
WBC # BLD AUTO: 6.69 THOUSAND/UL (ref 4.31–10.16)

## 2020-05-21 PROCEDURE — 80048 BASIC METABOLIC PNL TOTAL CA: CPT

## 2020-05-21 PROCEDURE — 85027 COMPLETE CBC AUTOMATED: CPT

## 2020-05-21 PROCEDURE — U0003 INFECTIOUS AGENT DETECTION BY NUCLEIC ACID (DNA OR RNA); SEVERE ACUTE RESPIRATORY SYNDROME CORONAVIRUS 2 (SARS-COV-2) (CORONAVIRUS DISEASE [COVID-19]), AMPLIFIED PROBE TECHNIQUE, MAKING USE OF HIGH THROUGHPUT TECHNOLOGIES AS DESCRIBED BY CMS-2020-01-R: HCPCS

## 2020-05-21 PROCEDURE — 85610 PROTHROMBIN TIME: CPT

## 2020-05-21 PROCEDURE — 36415 COLL VENOUS BLD VENIPUNCTURE: CPT

## 2020-05-21 PROCEDURE — 85730 THROMBOPLASTIN TIME PARTIAL: CPT

## 2020-05-21 RX ORDER — SODIUM CHLORIDE 9 MG/ML
75 INJECTION, SOLUTION INTRAVENOUS CONTINUOUS
Status: CANCELLED | OUTPATIENT
Start: 2020-05-21

## 2020-05-22 ENCOUNTER — TELEPHONE (OUTPATIENT)
Dept: RADIOLOGY | Facility: HOSPITAL | Age: 85
End: 2020-05-22

## 2020-05-22 LAB — SARS-COV-2 RNA SPEC QL NAA+PROBE: NOT DETECTED

## 2020-05-28 ENCOUNTER — TELEPHONE (OUTPATIENT)
Dept: INPATIENT UNIT | Facility: HOSPITAL | Age: 85
End: 2020-05-28

## 2020-05-29 ENCOUNTER — HOSPITAL ENCOUNTER (OUTPATIENT)
Dept: RADIOLOGY | Facility: HOSPITAL | Age: 85
Discharge: HOME | End: 2020-05-29
Attending: RADIOLOGY | Admitting: RADIOLOGY
Payer: MEDICARE

## 2020-05-29 VITALS
HEIGHT: 69 IN | TEMPERATURE: 97.4 F | BODY MASS INDEX: 24.44 KG/M2 | DIASTOLIC BLOOD PRESSURE: 75 MMHG | OXYGEN SATURATION: 100 % | WEIGHT: 165 LBS | SYSTOLIC BLOOD PRESSURE: 158 MMHG | HEART RATE: 83 BPM | RESPIRATION RATE: 23 BRPM

## 2020-05-29 DIAGNOSIS — I73.9 PAD (PERIPHERAL ARTERY DISEASE) (HCC): ICD-10-CM

## 2020-05-29 PROCEDURE — 99152 MOD SED SAME PHYS/QHP 5/>YRS: CPT | Performed by: RADIOLOGY

## 2020-05-29 PROCEDURE — 37224 HB FEM/POPL REVAS W/TLA: CPT

## 2020-05-29 PROCEDURE — 75625 CONTRAST EXAM ABDOMINL AORTA: CPT | Performed by: RADIOLOGY

## 2020-05-29 PROCEDURE — 76937 US GUIDE VASCULAR ACCESS: CPT | Performed by: RADIOLOGY

## 2020-05-29 PROCEDURE — 37224 PR REVSC OPN/PRG FEM/POP W/ANGIOPLASTY UNI: CPT | Performed by: RADIOLOGY

## 2020-05-29 PROCEDURE — C1760 CLOSURE DEV, VASC: HCPCS

## 2020-05-29 PROCEDURE — C1769 GUIDE WIRE: HCPCS

## 2020-05-29 PROCEDURE — C1894 INTRO/SHEATH, NON-LASER: HCPCS

## 2020-05-29 PROCEDURE — C1725 CATH, TRANSLUMIN NON-LASER: HCPCS

## 2020-05-29 PROCEDURE — 99153 MOD SED SAME PHYS/QHP EA: CPT

## 2020-05-29 PROCEDURE — C1887 CATHETER, GUIDING: HCPCS

## 2020-05-29 PROCEDURE — 99152 MOD SED SAME PHYS/QHP 5/>YRS: CPT

## 2020-05-29 PROCEDURE — 75710 ARTERY X-RAYS ARM/LEG: CPT | Performed by: RADIOLOGY

## 2020-05-29 RX ORDER — MORPHINE SULFATE 15 MG/1
15 TABLET ORAL EVERY 4 HOURS PRN
Status: DISCONTINUED | OUTPATIENT
Start: 2020-05-29 | End: 2020-05-29 | Stop reason: HOSPADM

## 2020-05-29 RX ORDER — ACETAMINOPHEN 160 MG/5ML
650 SUSPENSION, ORAL (FINAL DOSE FORM) ORAL EVERY 4 HOURS PRN
Status: DISCONTINUED | OUTPATIENT
Start: 2020-05-29 | End: 2020-05-29 | Stop reason: HOSPADM

## 2020-05-29 RX ORDER — MIDAZOLAM HYDROCHLORIDE 2 MG/2ML
INJECTION, SOLUTION INTRAMUSCULAR; INTRAVENOUS CODE/TRAUMA/SEDATION MEDICATION
Status: COMPLETED | OUTPATIENT
Start: 2020-05-29 | End: 2020-05-29

## 2020-05-29 RX ORDER — SODIUM CHLORIDE 9 MG/ML
75 INJECTION, SOLUTION INTRAVENOUS CONTINUOUS
Status: DISCONTINUED | OUTPATIENT
Start: 2020-05-29 | End: 2020-05-29 | Stop reason: HOSPADM

## 2020-05-29 RX ORDER — FENTANYL CITRATE 50 UG/ML
INJECTION, SOLUTION INTRAMUSCULAR; INTRAVENOUS CODE/TRAUMA/SEDATION MEDICATION
Status: COMPLETED | OUTPATIENT
Start: 2020-05-29 | End: 2020-05-29

## 2020-05-29 RX ORDER — HEPARIN SODIUM 1000 [USP'U]/ML
INJECTION, SOLUTION INTRAVENOUS; SUBCUTANEOUS CODE/TRAUMA/SEDATION MEDICATION
Status: COMPLETED | OUTPATIENT
Start: 2020-05-29 | End: 2020-05-29

## 2020-05-29 RX ADMIN — FENTANYL CITRATE 25 MCG: 50 INJECTION, SOLUTION INTRAMUSCULAR; INTRAVENOUS at 09:25

## 2020-05-29 RX ADMIN — HEPARIN SODIUM 5000 UNITS: 1000 INJECTION INTRAVENOUS; SUBCUTANEOUS at 09:48

## 2020-05-29 RX ADMIN — IODIXANOL 90 ML: 320 INJECTION, SOLUTION INTRAVASCULAR at 10:40

## 2020-05-29 RX ADMIN — MIDAZOLAM 0.5 MG: 1 INJECTION INTRAMUSCULAR; INTRAVENOUS at 09:35

## 2020-05-29 RX ADMIN — FENTANYL CITRATE 25 MCG: 50 INJECTION, SOLUTION INTRAMUSCULAR; INTRAVENOUS at 10:08

## 2020-05-29 RX ADMIN — MIDAZOLAM 0.5 MG: 1 INJECTION INTRAMUSCULAR; INTRAVENOUS at 10:08

## 2020-05-29 RX ADMIN — MIDAZOLAM 0.5 MG: 1 INJECTION INTRAMUSCULAR; INTRAVENOUS at 09:25

## 2020-05-29 RX ADMIN — FENTANYL CITRATE 25 MCG: 50 INJECTION, SOLUTION INTRAMUSCULAR; INTRAVENOUS at 09:35

## 2020-06-05 ENCOUNTER — TELEPHONE (OUTPATIENT)
Dept: VASCULAR SURGERY | Facility: CLINIC | Age: 85
End: 2020-06-05

## 2020-06-08 ENCOUNTER — OFFICE VISIT (OUTPATIENT)
Dept: VASCULAR SURGERY | Facility: CLINIC | Age: 85
End: 2020-06-08
Payer: MEDICARE

## 2020-06-08 VITALS
TEMPERATURE: 98.7 F | RESPIRATION RATE: 16 BRPM | WEIGHT: 163 LBS | DIASTOLIC BLOOD PRESSURE: 84 MMHG | HEART RATE: 74 BPM | HEIGHT: 69 IN | BODY MASS INDEX: 24.14 KG/M2 | SYSTOLIC BLOOD PRESSURE: 142 MMHG

## 2020-06-08 DIAGNOSIS — C34.11 MALIGNANT NEOPLASM OF UPPER LOBE OF RIGHT LUNG (HCC): ICD-10-CM

## 2020-06-08 DIAGNOSIS — I70.212 ATHEROSCLEROSIS OF NATIVE ARTERY OF LEFT LOWER EXTREMITY WITH INTERMITTENT CLAUDICATION (HCC): ICD-10-CM

## 2020-06-08 DIAGNOSIS — I48.21 PERMANENT ATRIAL FIBRILLATION (HCC): ICD-10-CM

## 2020-06-08 DIAGNOSIS — I65.23 ASYMPTOMATIC BILATERAL CAROTID ARTERY STENOSIS: ICD-10-CM

## 2020-06-08 DIAGNOSIS — I71.4 ABDOMINAL AORTIC ANEURYSM (AAA) WITHOUT RUPTURE (HCC): Primary | ICD-10-CM

## 2020-06-08 DIAGNOSIS — I87.2 VENOUS INSUFFICIENCY: ICD-10-CM

## 2020-06-08 PROBLEM — I70.219 ATHEROSCLER NATIVE ARTERIES THE EXTREMITIES W/INTERMIT CLAUDICATION (HCC): Status: ACTIVE | Noted: 2020-03-17

## 2020-06-08 PROCEDURE — 99215 OFFICE O/P EST HI 40 MIN: CPT | Performed by: SURGERY

## 2020-09-08 ENCOUNTER — TELEPHONE (OUTPATIENT)
Dept: VASCULAR SURGERY | Facility: CLINIC | Age: 85
End: 2020-09-08

## 2020-09-08 NOTE — TELEPHONE ENCOUNTER
Attempted to call patient back on his preferred phone number as the transferred call was disconnected  Voicemail box is full and unable to accept messages  Tried patient's mobile number and it kept ringing  Ernestina Jacosbon from call center originally transferred call to nursing due to patient reporting increased symptoms  The call was not there, and when nursing attempted calling patient, he was not able to be reached  Will try later

## 2020-09-08 NOTE — TELEPHONE ENCOUNTER
Spoke with patient who states he originally was doing well s/p intervention  The last week or so, patient c/o tight pressure/pain/cramping after walking about 2-3 minutes in his L leg  He is requesting to have EMILY done sooner as he is concerned it is becoming occluded  He is due in Nov, but transferred to [place] to schedule EMILY sooner as he has new symptoms

## 2020-09-23 ENCOUNTER — HOSPITAL ENCOUNTER (OUTPATIENT)
Dept: VASCULAR ULTRASOUND | Facility: HOSPITAL | Age: 85
Discharge: HOME | End: 2020-09-23
Payer: MEDICARE

## 2020-09-23 DIAGNOSIS — I73.9 PAD (PERIPHERAL ARTERY DISEASE) (HCC): ICD-10-CM

## 2020-09-23 PROCEDURE — 93925 LOWER EXTREMITY STUDY: CPT | Performed by: SURGERY

## 2020-09-23 PROCEDURE — 93922 UPR/L XTREMITY ART 2 LEVELS: CPT | Performed by: SURGERY

## 2020-09-23 PROCEDURE — 93923 UPR/LXTR ART STDY 3+ LVLS: CPT

## 2020-09-23 PROCEDURE — 93925 LOWER EXTREMITY STUDY: CPT

## 2020-09-28 ENCOUNTER — OFFICE VISIT (OUTPATIENT)
Dept: VASCULAR SURGERY | Facility: CLINIC | Age: 85
End: 2020-09-28
Payer: MEDICARE

## 2020-09-28 ENCOUNTER — TRANSCRIBE ORDERS (OUTPATIENT)
Dept: LAB | Facility: CLINIC | Age: 85
End: 2020-09-28

## 2020-09-28 ENCOUNTER — APPOINTMENT (OUTPATIENT)
Dept: LAB | Facility: CLINIC | Age: 85
End: 2020-09-28
Payer: MEDICARE

## 2020-09-28 ENCOUNTER — TELEPHONE (OUTPATIENT)
Dept: VASCULAR SURGERY | Facility: CLINIC | Age: 85
End: 2020-09-28

## 2020-09-28 VITALS
TEMPERATURE: 98.7 F | RESPIRATION RATE: 14 BRPM | SYSTOLIC BLOOD PRESSURE: 123 MMHG | BODY MASS INDEX: 24.07 KG/M2 | WEIGHT: 163 LBS | DIASTOLIC BLOOD PRESSURE: 72 MMHG | HEART RATE: 74 BPM

## 2020-09-28 DIAGNOSIS — I71.4 ABDOMINAL AORTIC ANEURYSM (AAA) WITHOUT RUPTURE (HCC): ICD-10-CM

## 2020-09-28 DIAGNOSIS — I70.213 ATHEROSCLEROSIS OF NATIVE ARTERY OF BOTH LOWER EXTREMITIES WITH INTERMITTENT CLAUDICATION (HCC): ICD-10-CM

## 2020-09-28 DIAGNOSIS — I65.23 ASYMPTOMATIC BILATERAL CAROTID ARTERY STENOSIS: ICD-10-CM

## 2020-09-28 DIAGNOSIS — I70.213 ATHEROSCLEROSIS OF NATIVE ARTERY OF BOTH LOWER EXTREMITIES WITH INTERMITTENT CLAUDICATION (HCC): Primary | ICD-10-CM

## 2020-09-28 DIAGNOSIS — C34.11 MALIGNANT NEOPLASM OF UPPER LOBE OF RIGHT LUNG (HCC): ICD-10-CM

## 2020-09-28 DIAGNOSIS — I87.2 VENOUS INSUFFICIENCY: ICD-10-CM

## 2020-09-28 LAB
ANION GAP SERPL CALCULATED.3IONS-SCNC: 7 MMOL/L (ref 4–13)
BUN SERPL-MCNC: 21 MG/DL (ref 5–25)
CALCIUM SERPL-MCNC: 9.3 MG/DL (ref 8.3–10.1)
CHLORIDE SERPL-SCNC: 102 MMOL/L (ref 100–108)
CO2 SERPL-SCNC: 29 MMOL/L (ref 21–32)
CREAT SERPL-MCNC: 0.84 MG/DL (ref 0.6–1.3)
ERYTHROCYTE [DISTWIDTH] IN BLOOD BY AUTOMATED COUNT: 13.6 % (ref 11.6–15.1)
GFR SERPL CREATININE-BSD FRML MDRD: 80 ML/MIN/1.73SQ M
GLUCOSE SERPL-MCNC: 94 MG/DL (ref 65–140)
HCT VFR BLD AUTO: 38.5 % (ref 36.5–49.3)
HGB BLD-MCNC: 13 G/DL (ref 12–17)
INR PPP: 1.15 (ref 0.84–1.19)
MCH RBC QN AUTO: 33.2 PG (ref 26.8–34.3)
MCHC RBC AUTO-ENTMCNC: 33.8 G/DL (ref 31.4–37.4)
MCV RBC AUTO: 99 FL (ref 82–98)
PLATELET # BLD AUTO: 145 THOUSANDS/UL (ref 149–390)
PMV BLD AUTO: 10.6 FL (ref 8.9–12.7)
POTASSIUM SERPL-SCNC: 4.4 MMOL/L (ref 3.5–5.3)
PROTHROMBIN TIME: 14.8 SECONDS (ref 11.6–14.5)
RBC # BLD AUTO: 3.91 MILLION/UL (ref 3.88–5.62)
SODIUM SERPL-SCNC: 138 MMOL/L (ref 136–145)
WBC # BLD AUTO: 5.51 THOUSAND/UL (ref 4.31–10.16)

## 2020-09-28 PROCEDURE — 85027 COMPLETE CBC AUTOMATED: CPT

## 2020-09-28 PROCEDURE — 85610 PROTHROMBIN TIME: CPT

## 2020-09-28 PROCEDURE — 80048 BASIC METABOLIC PNL TOTAL CA: CPT

## 2020-09-28 PROCEDURE — 36415 COLL VENOUS BLD VENIPUNCTURE: CPT

## 2020-09-28 PROCEDURE — 99215 OFFICE O/P EST HI 40 MIN: CPT | Performed by: SURGERY

## 2020-09-28 RX ORDER — SODIUM CHLORIDE 9 MG/ML
125 INJECTION, SOLUTION INTRAVENOUS CONTINUOUS
Status: CANCELLED | OUTPATIENT
Start: 2020-09-28

## 2020-09-28 RX ORDER — ASPIRIN 81 MG/1
81 TABLET ORAL DAILY
Start: 2020-09-28 | End: 2021-01-01 | Stop reason: HOSPADM

## 2020-09-28 NOTE — TELEPHONE ENCOUNTER
Surgery Date: 10/9/20  Primary Surgeon: Gabi Simmons (NPI: [id number])  Assisting Surgeon: Not Applicable (N/A)  Facility: [facility] (Tax: [id number] / NPI: [id number])  Inpatient / Outpatient: Outpatient  Level: 3    Clearance Received: No clearance ordered  Consent Received: Yes, scanned into Epic on 9/28/20  Medication Hold / Last Dose: Reena Coast 2 DAYS, LAST DOSE 10/6/20  VQI Spreadsheet: Not Applicable (N/A)  IR Notified: Not Applicable (N/A)  Rep  Notified: Not Applicable (N/A)  Equipment Needs: Not Applicable (N/A)  Vas Lab Requested: Not Applicable (N/A)  Patient Contacted: 9/28/20 DURING PATIENT HOURS WITH LMD    Diagnosis: [id number]    Procedure/ CPT Code(s): Q0350504, [id number], [id number], [id number]    Is this an EVLT Case? No   LEVDR? Not Applicable (N/A)  Is patient requesting a call when authorization has been obtained? Patient did not request a call  Post Operative Date/ Time: 10/21/20  , 10:30AM  Shireen Aase) with [facility] (NPI: [id number])     *Please review with patient med hold, PATs, and check H&P *  PATIENT WAS MAILED SURGERY/SHOWERING/DISCHARGE/COVID INSTRUCTIONS AFTER REVIEWING WITH HER VIA PHONE CALL  Patient went for labs directly after office visit   rec surgery folder w/instructions

## 2020-09-28 NOTE — PATIENT INSTRUCTIONS
1) PAD  -you had a successful procedure to treat the blockages in the left leg  -however your recent ultrasound showed that these blockages have recurred  -we are planning another angiogram procedure to try to improve the blood flow down your left leg and relieve these symptoms  -you may need a second procedure for the right leg    2) AAA  -you have an abdominal aortic aneurysm which is 4 2cm in size  -we will monitor this with ultrasound on a 6month basis  -once it reaches 5 5cm in size, we will discuss repair  -if you have sudden onset of severe abdominal and back pain, please come to the hospital for evaluation    3) Carotid stenosis  -you have mild blockages in the arteries in the neck  -we will monitor this with a yearly ultrasound (due in Nov 2020)    4) Venous disease  -if you have any more episodes of bleeding from your veins, put direct pressure on it with your finger and elevate you leg in the air as high as you can  -call my office once the bleeding stops  -if this happens again, we will plan to inject it with a solution to prevent more bleeding  -wearing compression socks with help with the swellign in your legs; you need to wear these daily to be affective    5) Medications  -please continue taking your Xarelto  -please start taking aspirin 81mg once daily  -please discuss with your PCP regarding starting a statin medication    Angiogram   WHAT YOU NEED TO KNOW:   What do I need to know about an angiogram?  An angiogram is used to examine blood flow through your arteries  Arteries carry blood from your heart to your body  How do I prepare for the procedure? Your healthcare provider will tell you how to prepare for your procedure  He may tell you not to eat or drink anything after midnight on the day of your procedure  He will tell you what medicines to take or not take on the day of your procedure  Contrast liquid will be used during the procedure to help your arteries show up better in the pictures  Tell your healthcare provider if you have ever had an allergic reaction to contrast liquid  Arrange to have someone drive you home after your procedure  What will happen during the procedure? · You may be given medicine to help you relax or make you drowsy  You may get local anesthesia to numb the area where the angiogram catheter will go in  Hair may be removed from the procedure site  · A catheter will be put into an artery in your leg near your groin, or in your arm  The catheter travels through the artery to the area being studied  Contrast liquid is put through the catheter to help your blood vessels and organs show up better in the x-ray pictures  You may feel warm as the liquid is put into the catheter  You may get a headache or feel nauseated  These feelings should go away quickly  · Your healthcare providers will remove the catheter and apply pressure to the wound for several minutes  They may place a pressure bandage or other pressure device over the wound to help stop any bleeding  What will happen after the procedure? You will be on a heart monitor until you are fully awake  A heart monitor continuously records the electrical activity of your heart  Healthcare providers will frequently monitor your vital signs and pulses  They will also frequently check your wound for bleeding  Keep your leg straight  Do not  get out of bed until your healthcare provider says it is okay  After you are monitored for several hours, you may be able to go home  What are the risks of the procedure? · You may bleed heavily after your catheter is removed  The catheter may damage your artery, and you may need surgery to fix the damage  You could have kidney problems from the contrast liquid  You could have an allergic reaction to the contrast liquid or numbing medicine  · You may develop a blood clot that causes pain and swelling, and stops blood from flowing   A blood clot in your leg can break loose and travel to your lungs and become life-threatening  CARE AGREEMENT:   You have the right to help plan your care  Learn about your health condition and how it may be treated  Discuss treatment options with your caregivers to decide what care you want to receive  You always have the right to refuse treatment  The above information is an  only  It is not intended as medical advice for individual conditions or treatments  Talk to your doctor, nurse or pharmacist before following any medical regimen to see if it is safe and effective for you  © 2017 [employer] Information is for End User's use only and may not be sold, redistributed or otherwise used for commercial purposes  All illustrations and images included in CareNotes® are the copyrighted property of [employer]  or [employer]

## 2020-09-28 NOTE — PROGRESS NOTES
Assessment/Plan:    Pt is an 81 yo M w/ HLD, afib, HTN, HLD, venous disease, AAA, carotid stenosis, PAD w/ LLE claudication s/p agram w/ L SFA/pop PTA 5/29/20 (Ynes Grandchild), presents with recurrent claudication    Atherosclerosis of native artery of both lower extremities with intermittent claudication (HCC)  -     aspirin (ECOTRIN LOW STRENGTH) 81 mg EC tablet; Take 1 tablet (81 mg total) by mouth daily  -     IR aortagram with run-off; Future  -     Basic metabolic panel; Future  -     CBC and Platelet; Future  -     Protime-INR;  Future  -reviewed pre-procedure ABIs from [facility]: 1 05/0 39  -now s/p LLE angiogram w/ L SFA/pop PTA 5/29/20 [facility]  -claudication resolved x 1 month and now returned; L>R claudication  -reviewed LEADs which shows R: 0 71/62/30 and L: 0 52/-/- with R profunda and distal SFA 50-75% stenoses, L CFA stenosis, L SFA >75% stenosis, AT/PT occlusion  -discussed that he has recurrence of disease on the L side; discussed options including walking/medical therapy vs further intervention; patient feels that his symptoms are debilitating and would like further intervention; discussed LLE angiogram including risks and benefits; all questions answered and patient consented; discussed that he must commit to taking antiplatelet agent if we are going to do further intervention  -plan for LLE angiogram, R femoral access  -labs    Abdominal aortic aneurysm (AAA) without rupture ([address] )  -reviewed PET scan which shows 4 2cm AAA  -will continue to monitor this on a q6mos basis (due now but scheduled in Nov to coordinate with his LEADs, however, he got LEADs early for new symptoms)    Asymptomatic bilateral carotid artery stenosis  -L carotid bruit on exam  -reviewed DU from 11/19 which showed B ICA <50% stenosis ([facility])   -will repeat on a yearly basis    Venous insufficiency  -evidence of venous disease and single episode of bleeding from L lateral malleolus  -advised to hold pressure and elevate if further bleeding occurs and to call the office for appt  -prior bleeding site now well healed; if bleeding occurred again, would plan for sclero injection; could not visualize feeding vessel on last visit    Malignant neoplasm of upper lobe of right lung ([facility] [address] )  -Untreated malignancy would need to be part of the risk/benefit discussion should any larger vascular procedures be needed  -currently only treating with IV vitamin C which he believes to be working    [facility]  Medications  -again strongly recommended ASA 81mg once daily given PAD and now early recurrence of disease after intervention; pt agreeable to this (didn't start after last visit)  -also continue Xarelto for afib  -also recommended statin as he has HLD and PAD; patient reluctant to start this; Other orders  -     metoprolol tartrate (LOPRESSOR) 25 mg tablet; Take 25 mg by mouth every 12 (twelve) hours  -     Notify Physician if PT/INR greater than 1 8 and/or Creatinine Clearance (GFR)  is less than 60  ml/milla/1 73sq m; Standing  -     Height and Weight Upon Arrival; Standing  -     Nursing communication Apply snapless gown prior to procedure; Standing  -     Have Patient Void On Call to Procedure Room; Standing  -     Insert and Maintain IV; Standing  -     Nursing communication Confirm last dose of any anticoagulation and antiplatelet medication and notify IR; Standing  -     Nursing communication If patient on diabetic medications, nurse to notify physician to ensure the medications are adjusted preprocedure ; Standing  -     sodium chloride 0 9 % infusion      Operative Scheduling Information:    Hospital:  Any IR/endo suite    Physician:  Me    Surgery: LLE angiogram, R femoral access    Urgency:  Standard    Level:  Level 4: Outpatients to be scheduled for screening procedures and elective surgery that can be delayed for longer than one month without reasonable expectation of detriment to patient      Case Length:  Normal    Post-op Bed:  Outpatient    OR Table:  IR    Equipment Needs:  None    Medication Instructions:  Aspirin:   Continue (do not hold)  Xarelto:  Hold for 2 days prior to procedure    Hydration:  No      Subjective:      Patient ID: Ernestina Ashley is a 80 y o  male  Patient is here today to follow up from his EMILY doppler on 9/23  He requested to move up his original doppler due to increased pain w/ walking about 2-3 mins, and swelling in his left leg, patient has relief with rest, he states he is also experiencing numbness in both feet after standing for about 15 mins  He is having some pain with walking in his Right leg, but states its not as bad as the left  HPI:    Patient returns for followup with new claudication symptoms  Patient initially complained of claudication in his L calf with ambulation  He would have to stop and rest every 2-3min while walking  He had an angiogram procedure with Dr Saul Dyson and initially had complete relief of his symptoms  This lasted for ~1 month and now has recurrance of his LLE claudication at about 3min of walking  He can walk a maximum of 5 minutes before having to stop and rest   Resting relieves the discomfort  He feels the cramping in his calf, shin, and ankle/foot  He also gets RLE claudication but this is less severe  He has hx of an episode of bleeding from the L lateral malleolus  He had a scab there that he had picked off the day prior  No further episodes of this  The scabbed area is now completely healed  He has some BLE edema, L>R  He tried compression in the past briefly but these hurt him  Patient also has known lung cancer which he is choosing to treat with vitamin C injections rather than the recommended chemo/rads  He says "it's working" for him because his cancer hasn't metastasized  He also takes many supplements  He is taking Xarelto (he reports only 10mg, error?)    He is not taking antiplatelet or statin  The following portions of the patient's history were reviewed and updated as appropriate: allergies, current medications, past family history, past medical history, past social history, past surgical history and problem list     Review of Systems   Constitutional: Negative  Negative for chills and fever  HENT: Negative  Negative for postnasal drip  Eyes: Negative  Respiratory: Negative  Negative for shortness of breath  Cardiovascular: Positive for leg swelling  Negative for chest pain  Left leg pain with walking 2-3 minutes    Gastrointestinal: Negative  Negative for diarrhea, nausea and vomiting  Endocrine: Negative  Genitourinary: Negative  Musculoskeletal: Positive for myalgias (BLE alethea)  Skin: Positive for color change  Negative for rash and wound  Allergic/Immunologic: Negative  Neurological: Negative  Hematological: Negative  Does not bruise/bleed easily  Psychiatric/Behavioral: Negative  Negative for confusion  The patient is not nervous/anxious  Objective:      /72 (BP Location: Right arm)   Pulse 74   Temp 98 7 °F (37 1 °C)   Resp 14   Wt 73 9 kg (163 lb)   BMI 24 07 kg/m²          Physical Exam  Vitals signs and nursing note reviewed  Constitutional:       Appearance: He is well-developed  HENT:      Head: Normocephalic and atraumatic  Eyes:      Conjunctiva/sclera: Conjunctivae normal    Neck:      Musculoskeletal: Normal range of motion and neck supple  Cardiovascular:      Rate and Rhythm: Normal rate and regular rhythm  Pulses:           Carotid pulses are on the left side with bruit  Radial pulses are 2+ on the right side and 2+ on the left side  Femoral pulses are 2+ on the right side and 2+ on the left side  Popliteal pulses are 0 on the right side and 0 on the left side  Dorsalis pedis pulses are 0 on the right side and 0 on the left side          Posterior tibial pulses are 0 on the right side and 0 on the left side  Heart sounds: Normal heart sounds  No murmur  Comments: No R carotid bruit  Pulmonary:      Effort: Pulmonary effort is normal  No respiratory distress  Breath sounds: Normal breath sounds  No wheezing or rales  Abdominal:      General: There is no distension  Palpations: Abdomen is soft  There is pulsatile mass (no tenderness)  Tenderness: There is no abdominal tenderness  There is no rebound  Musculoskeletal: Normal range of motion  Skin:     General: Skin is warm and dry  Comments: B feet without wounds  Diffuse spiders and retics; dense spiders to B feet L>R; ~2mm scab at L lateral malleolus from prior bleeding site is now healed   Neurological:      Mental Status: He is alert and oriented to person, place, and time     Psychiatric:         Behavior: Behavior normal

## 2020-10-01 ENCOUNTER — ANESTHESIA EVENT (OUTPATIENT)
Dept: PERIOP | Facility: HOSPITAL | Age: 85
End: 2020-10-01
Payer: MEDICARE

## 2020-10-07 RX ORDER — HYDROCHLOROTHIAZIDE 25 MG/1
25 TABLET ORAL DAILY
COMMUNITY
End: 2021-01-01 | Stop reason: HOSPADM

## 2020-10-09 ENCOUNTER — APPOINTMENT (OUTPATIENT)
Dept: RADIOLOGY | Facility: HOSPITAL | Age: 85
End: 2020-10-09
Payer: MEDICARE

## 2020-10-09 ENCOUNTER — HOSPITAL ENCOUNTER (OUTPATIENT)
Facility: HOSPITAL | Age: 85
Setting detail: OUTPATIENT SURGERY
Discharge: HOME | End: 2020-10-09
Attending: SURGERY | Admitting: SURGERY
Payer: MEDICARE

## 2020-10-09 ENCOUNTER — ANESTHESIA (OUTPATIENT)
Dept: PERIOP | Facility: HOSPITAL | Age: 85
End: 2020-10-09
Payer: MEDICARE

## 2020-10-09 VITALS
TEMPERATURE: 97.4 F | WEIGHT: 164 LBS | DIASTOLIC BLOOD PRESSURE: 75 MMHG | HEART RATE: 73 BPM | BODY MASS INDEX: 24.29 KG/M2 | HEIGHT: 69 IN | RESPIRATION RATE: 18 BRPM | OXYGEN SATURATION: 100 % | SYSTOLIC BLOOD PRESSURE: 152 MMHG

## 2020-10-09 VITALS — HEART RATE: 60 BPM

## 2020-10-09 DIAGNOSIS — I70.213 ATHEROSCLEROSIS OF NATIVE ARTERY OF BOTH LOWER EXTREMITIES WITH INTERMITTENT CLAUDICATION (HCC): ICD-10-CM

## 2020-10-09 PROCEDURE — C1725 CATH, TRANSLUMIN NON-LASER: HCPCS | Performed by: SURGERY

## 2020-10-09 PROCEDURE — C1769 GUIDE WIRE: HCPCS | Performed by: SURGERY

## 2020-10-09 PROCEDURE — 76937 US GUIDE VASCULAR ACCESS: CPT

## 2020-10-09 PROCEDURE — C1760 CLOSURE DEV, VASC: HCPCS | Performed by: SURGERY

## 2020-10-09 PROCEDURE — C1894 INTRO/SHEATH, NON-LASER: HCPCS | Performed by: SURGERY

## 2020-10-09 PROCEDURE — G9198 NO ORDER FOR CEPH NO REASON: HCPCS | Performed by: SURGERY

## 2020-10-09 PROCEDURE — 75710 ARTERY X-RAYS ARM/LEG: CPT | Performed by: SURGERY

## 2020-10-09 PROCEDURE — C1876 STENT, NON-COA/NON-COV W/DEL: HCPCS | Performed by: SURGERY

## 2020-10-09 PROCEDURE — C1887 CATHETER, GUIDING: HCPCS | Performed by: SURGERY

## 2020-10-09 PROCEDURE — 37226 PR REVASCULARIZE FEM/POP ARTERY,ANGIOPLASTY/STENT: CPT | Performed by: SURGERY

## 2020-10-09 PROCEDURE — 76937 US GUIDE VASCULAR ACCESS: CPT | Performed by: SURGERY

## 2020-10-09 DEVICE — DEVICE CLOSURE MYNX CONTROL 6F/7FR: Type: IMPLANTABLE DEVICE | Site: ARTERIAL | Status: FUNCTIONAL

## 2020-10-09 DEVICE — SELF-EXPANDING STENT SYSTEM
Type: IMPLANTABLE DEVICE | Site: ARTERIAL | Status: FUNCTIONAL
Brand: INNOVA™ VASCULAR

## 2020-10-09 RX ORDER — LIDOCAINE HYDROCHLORIDE 10 MG/ML
INJECTION, SOLUTION EPIDURAL; INFILTRATION; INTRACAUDAL; PERINEURAL AS NEEDED
Status: DISCONTINUED | OUTPATIENT
Start: 2020-10-09 | End: 2020-10-09 | Stop reason: HOSPADM

## 2020-10-09 RX ORDER — FENTANYL CITRATE 50 UG/ML
INJECTION, SOLUTION INTRAMUSCULAR; INTRAVENOUS AS NEEDED
Status: DISCONTINUED | OUTPATIENT
Start: 2020-10-09 | End: 2020-10-09

## 2020-10-09 RX ORDER — SODIUM CHLORIDE 9 MG/ML
125 INJECTION, SOLUTION INTRAVENOUS CONTINUOUS
Status: DISCONTINUED | OUTPATIENT
Start: 2020-10-09 | End: 2020-10-09 | Stop reason: HOSPADM

## 2020-10-09 RX ORDER — HEPARIN SODIUM 1000 [USP'U]/ML
INJECTION, SOLUTION INTRAVENOUS; SUBCUTANEOUS AS NEEDED
Status: DISCONTINUED | OUTPATIENT
Start: 2020-10-09 | End: 2020-10-09

## 2020-10-09 RX ORDER — HEPARIN SODIUM 200 [USP'U]/100ML
INJECTION, SOLUTION INTRAVENOUS
Status: COMPLETED | OUTPATIENT
Start: 2020-10-09 | End: 2020-10-09

## 2020-10-09 RX ORDER — SODIUM CHLORIDE, SODIUM LACTATE, POTASSIUM CHLORIDE, CALCIUM CHLORIDE 600; 310; 30; 20 MG/100ML; MG/100ML; MG/100ML; MG/100ML
INJECTION, SOLUTION INTRAVENOUS CONTINUOUS PRN
Status: DISCONTINUED | OUTPATIENT
Start: 2020-10-09 | End: 2020-10-09

## 2020-10-09 RX ORDER — FENTANYL CITRATE/PF 50 MCG/ML
25 SYRINGE (ML) INJECTION
Status: DISCONTINUED | OUTPATIENT
Start: 2020-10-09 | End: 2020-10-09 | Stop reason: HOSPADM

## 2020-10-09 RX ORDER — PROPOFOL 10 MG/ML
INJECTION, EMULSION INTRAVENOUS AS NEEDED
Status: DISCONTINUED | OUTPATIENT
Start: 2020-10-09 | End: 2020-10-09

## 2020-10-09 RX ORDER — PROPOFOL 10 MG/ML
INJECTION, EMULSION INTRAVENOUS CONTINUOUS PRN
Status: DISCONTINUED | OUTPATIENT
Start: 2020-10-09 | End: 2020-10-09

## 2020-10-09 RX ORDER — OXYCODONE HYDROCHLORIDE AND ACETAMINOPHEN 5; 325 MG/1; MG/1
1 TABLET ORAL EVERY 4 HOURS PRN
Status: DISCONTINUED | OUTPATIENT
Start: 2020-10-09 | End: 2020-10-09 | Stop reason: HOSPADM

## 2020-10-09 RX ADMIN — PHENYLEPHRINE HYDROCHLORIDE 10 MCG/MIN: 10 INJECTION INTRAVENOUS at 08:06

## 2020-10-09 RX ADMIN — HEPARIN SODIUM 5000 UNITS: 1000 INJECTION INTRAVENOUS; SUBCUTANEOUS at 08:44

## 2020-10-09 RX ADMIN — FENTANYL CITRATE 25 MCG: 50 INJECTION, SOLUTION INTRAMUSCULAR; INTRAVENOUS at 08:08

## 2020-10-09 RX ADMIN — PROPOFOL 20 MG: 10 INJECTION, EMULSION INTRAVENOUS at 08:04

## 2020-10-09 RX ADMIN — FENTANYL CITRATE 25 MCG: 50 INJECTION, SOLUTION INTRAMUSCULAR; INTRAVENOUS at 08:55

## 2020-10-09 RX ADMIN — PROPOFOL 40 MCG/KG/MIN: 10 INJECTION, EMULSION INTRAVENOUS at 08:04

## 2020-10-09 RX ADMIN — SODIUM CHLORIDE, SODIUM LACTATE, POTASSIUM CHLORIDE, AND CALCIUM CHLORIDE: .6; .31; .03; .02 INJECTION, SOLUTION INTRAVENOUS at 07:56

## 2021-01-01 ENCOUNTER — TELEPHONE (OUTPATIENT)
Dept: UROLOGY | Facility: MEDICAL CENTER | Age: 86
End: 2021-01-01

## 2021-01-01 ENCOUNTER — PROCEDURE VISIT (OUTPATIENT)
Dept: UROLOGY | Facility: AMBULATORY SURGERY CENTER | Age: 86
End: 2021-01-01
Payer: MEDICARE

## 2021-01-01 ENCOUNTER — ANESTHESIA EVENT (OUTPATIENT)
Dept: PERIOP | Facility: HOSPITAL | Age: 86
End: 2021-01-01
Payer: MEDICARE

## 2021-01-01 ENCOUNTER — DOCUMENTATION (OUTPATIENT)
Dept: INTERVENTIONAL RADIOLOGY/VASCULAR | Facility: CLINIC | Age: 86
End: 2021-01-01

## 2021-01-01 ENCOUNTER — HOSPITAL ENCOUNTER (OUTPATIENT)
Facility: HOSPITAL | Age: 86
Setting detail: OUTPATIENT SURGERY
Discharge: HOME | End: 2021-01-15
Attending: UROLOGY | Admitting: UROLOGY
Payer: MEDICARE

## 2021-01-01 ENCOUNTER — HOSPITAL ENCOUNTER (OUTPATIENT)
Dept: RADIOLOGY | Facility: HOSPITAL | Age: 86
Discharge: HOME | End: 2021-08-06
Payer: MEDICARE

## 2021-01-01 ENCOUNTER — HOSPITAL ENCOUNTER (INPATIENT)
Facility: HOSPITAL | Age: 86
LOS: 5 days | Discharge: HOSPICE | DRG: 391 | End: 2021-10-06
Attending: EMERGENCY MEDICINE | Admitting: HOSPITALIST
Payer: MEDICARE

## 2021-01-01 ENCOUNTER — APPOINTMENT (OUTPATIENT)
Dept: LAB | Facility: CLINIC | Age: 86
End: 2021-01-01
Payer: MEDICARE

## 2021-01-01 ENCOUNTER — HOSPITAL ENCOUNTER (INPATIENT)
Facility: HOSPITAL | Age: 86
LOS: 2 days | Discharge: HOME | DRG: 666 | End: 2021-04-18
Attending: HOSPITALIST | Admitting: HOSPITALIST
Payer: MEDICARE

## 2021-01-01 ENCOUNTER — HOSPITAL ENCOUNTER (INPATIENT)
Facility: HOSPITAL | Age: 86
LOS: 2 days | Discharge: HOME | DRG: 605 | End: 2021-09-26
Attending: EMERGENCY MEDICINE | Admitting: INTERNAL MEDICINE
Payer: MEDICARE

## 2021-01-01 ENCOUNTER — TELEPHONE (OUTPATIENT)
Dept: RADIOLOGY | Facility: HOSPITAL | Age: 86
End: 2021-01-01

## 2021-01-01 ENCOUNTER — TELEPHONE (OUTPATIENT)
Dept: VASCULAR SURGERY | Facility: CLINIC | Age: 86
End: 2021-01-01

## 2021-01-01 ENCOUNTER — TRANSCRIBE ORDERS (OUTPATIENT)
Dept: ADMINISTRATIVE | Age: 86
End: 2021-01-01

## 2021-01-01 ENCOUNTER — APPOINTMENT (OUTPATIENT)
Dept: CT IMAGING | Facility: HOSPITAL | Age: 86
End: 2021-01-01
Payer: MEDICARE

## 2021-01-01 ENCOUNTER — HOSPITAL ENCOUNTER (OUTPATIENT)
Dept: CT IMAGING | Facility: HOSPITAL | Age: 86
Discharge: HOME | End: 2021-07-09
Attending: SURGERY
Payer: MEDICARE

## 2021-01-01 ENCOUNTER — ANESTHESIA (INPATIENT)
Dept: PERIOP | Facility: HOSPITAL | Age: 86
DRG: 666 | End: 2021-01-01
Payer: MEDICARE

## 2021-01-01 ENCOUNTER — HOSPITAL ENCOUNTER (OUTPATIENT)
Dept: NON INVASIVE DIAGNOSTICS | Facility: CLINIC | Age: 86
Discharge: HOME | End: 2021-01-20
Payer: MEDICARE

## 2021-01-01 ENCOUNTER — TELEPHONE (OUTPATIENT)
Dept: UROLOGY | Facility: AMBULATORY SURGERY CENTER | Age: 86
End: 2021-01-01

## 2021-01-01 ENCOUNTER — OFFICE VISIT (OUTPATIENT)
Dept: VASCULAR SURGERY | Facility: CLINIC | Age: 86
End: 2021-01-01
Payer: MEDICARE

## 2021-01-01 ENCOUNTER — TELEPHONE (OUTPATIENT)
Dept: OTHER | Facility: OTHER | Age: 86
End: 2021-01-01

## 2021-01-01 ENCOUNTER — HOSPITAL ENCOUNTER (OUTPATIENT)
Facility: HOSPITAL | Age: 86
Setting detail: OBSERVATION
End: 2021-04-16
Attending: EMERGENCY MEDICINE | Admitting: INTERNAL MEDICINE
Payer: MEDICARE

## 2021-01-01 ENCOUNTER — HOSPITAL ENCOUNTER (INPATIENT)
Facility: HOSPITAL | Age: 86
LOS: 4 days | Discharge: HOME HEALTH | DRG: 641 | End: 2021-09-21
Attending: EMERGENCY MEDICINE | Admitting: INTERNAL MEDICINE
Payer: MEDICARE

## 2021-01-01 ENCOUNTER — HOSPITAL ENCOUNTER (OUTPATIENT)
Facility: HOSPITAL | Age: 86
Setting detail: OBSERVATION
Discharge: HOME | End: 2021-03-04
Attending: UROLOGY | Admitting: UROLOGY
Payer: MEDICARE

## 2021-01-01 ENCOUNTER — ANESTHESIA (OUTPATIENT)
Dept: PERIOP | Facility: HOSPITAL | Age: 86
End: 2021-01-01
Payer: MEDICARE

## 2021-01-01 ENCOUNTER — HOSPITAL ENCOUNTER (OUTPATIENT)
Dept: RADIOLOGY | Facility: HOSPITAL | Age: 86
Discharge: HOME | End: 2021-05-27
Attending: RADIOLOGY | Admitting: RADIOLOGY
Payer: MEDICARE

## 2021-01-01 ENCOUNTER — PREP FOR PROCEDURE (OUTPATIENT)
Dept: INTERVENTIONAL RADIOLOGY/VASCULAR | Facility: CLINIC | Age: 86
End: 2021-01-01

## 2021-01-01 ENCOUNTER — TRANSCRIBE ORDERS (OUTPATIENT)
Dept: LAB | Facility: CLINIC | Age: 86
End: 2021-01-01

## 2021-01-01 ENCOUNTER — APPOINTMENT (EMERGENCY)
Dept: RADIOLOGY | Facility: HOSPITAL | Age: 86
DRG: 605 | End: 2021-01-01
Payer: MEDICARE

## 2021-01-01 ENCOUNTER — OFFICE VISIT (OUTPATIENT)
Dept: UROLOGY | Facility: AMBULATORY SURGERY CENTER | Age: 86
End: 2021-01-01
Payer: MEDICARE

## 2021-01-01 ENCOUNTER — HOSPITAL ENCOUNTER (OUTPATIENT)
Dept: NON INVASIVE DIAGNOSTICS | Facility: CLINIC | Age: 86
Discharge: HOME | End: 2021-04-30
Payer: MEDICARE

## 2021-01-01 ENCOUNTER — TELEPHONE (OUTPATIENT)
Dept: ADMINISTRATIVE | Facility: HOSPITAL | Age: 86
End: 2021-01-01

## 2021-01-01 ENCOUNTER — ANESTHESIA EVENT (INPATIENT)
Dept: PERIOP | Facility: HOSPITAL | Age: 86
DRG: 666 | End: 2021-01-01
Payer: MEDICARE

## 2021-01-01 VITALS
DIASTOLIC BLOOD PRESSURE: 79 MMHG | RESPIRATION RATE: 16 BRPM | HEART RATE: 84 BPM | SYSTOLIC BLOOD PRESSURE: 129 MMHG | BODY MASS INDEX: 25.27 KG/M2 | TEMPERATURE: 98.2 F | OXYGEN SATURATION: 96 % | WEIGHT: 151.68 LBS | HEIGHT: 65 IN

## 2021-01-01 VITALS
BODY MASS INDEX: 24.51 KG/M2 | SYSTOLIC BLOOD PRESSURE: 121 MMHG | HEART RATE: 54 BPM | WEIGHT: 166 LBS | DIASTOLIC BLOOD PRESSURE: 71 MMHG | TEMPERATURE: 98 F | OXYGEN SATURATION: 99 % | RESPIRATION RATE: 16 BRPM

## 2021-01-01 VITALS
SYSTOLIC BLOOD PRESSURE: 102 MMHG | BODY MASS INDEX: 23.8 KG/M2 | HEIGHT: 65 IN | HEART RATE: 144 BPM | RESPIRATION RATE: 18 BRPM | TEMPERATURE: 98.2 F | OXYGEN SATURATION: 96 % | WEIGHT: 142.86 LBS | DIASTOLIC BLOOD PRESSURE: 65 MMHG

## 2021-01-01 VITALS
BODY MASS INDEX: 23.85 KG/M2 | RESPIRATION RATE: 12 BRPM | WEIGHT: 161 LBS | HEIGHT: 69 IN | SYSTOLIC BLOOD PRESSURE: 152 MMHG | OXYGEN SATURATION: 97 % | HEART RATE: 62 BPM | DIASTOLIC BLOOD PRESSURE: 75 MMHG | TEMPERATURE: 98 F

## 2021-01-01 VITALS
DIASTOLIC BLOOD PRESSURE: 70 MMHG | BODY MASS INDEX: 25.15 KG/M2 | WEIGHT: 160.25 LBS | RESPIRATION RATE: 18 BRPM | HEIGHT: 67 IN | TEMPERATURE: 96.9 F | HEART RATE: 75 BPM | SYSTOLIC BLOOD PRESSURE: 124 MMHG

## 2021-01-01 VITALS
DIASTOLIC BLOOD PRESSURE: 67 MMHG | HEART RATE: 66 BPM | RESPIRATION RATE: 14 BRPM | SYSTOLIC BLOOD PRESSURE: 138 MMHG | OXYGEN SATURATION: 100 %

## 2021-01-01 VITALS
WEIGHT: 165 LBS | HEART RATE: 62 BPM | HEIGHT: 69 IN | SYSTOLIC BLOOD PRESSURE: 134 MMHG | BODY MASS INDEX: 24.44 KG/M2 | RESPIRATION RATE: 18 BRPM | DIASTOLIC BLOOD PRESSURE: 90 MMHG

## 2021-01-01 VITALS
WEIGHT: 161 LBS | DIASTOLIC BLOOD PRESSURE: 53 MMHG | OXYGEN SATURATION: 99 % | HEART RATE: 82 BPM | TEMPERATURE: 98.2 F | RESPIRATION RATE: 16 BRPM | SYSTOLIC BLOOD PRESSURE: 113 MMHG | BODY MASS INDEX: 23.78 KG/M2

## 2021-01-01 VITALS
HEIGHT: 67 IN | HEART RATE: 84 BPM | BODY MASS INDEX: 25.11 KG/M2 | SYSTOLIC BLOOD PRESSURE: 124 MMHG | DIASTOLIC BLOOD PRESSURE: 62 MMHG | TEMPERATURE: 98 F | WEIGHT: 160 LBS | RESPIRATION RATE: 16 BRPM

## 2021-01-01 VITALS
HEIGHT: 67 IN | WEIGHT: 161 LBS | BODY MASS INDEX: 25.27 KG/M2 | SYSTOLIC BLOOD PRESSURE: 158 MMHG | DIASTOLIC BLOOD PRESSURE: 90 MMHG | HEART RATE: 69 BPM

## 2021-01-01 VITALS
HEART RATE: 71 BPM | TEMPERATURE: 98.2 F | OXYGEN SATURATION: 95 % | SYSTOLIC BLOOD PRESSURE: 139 MMHG | DIASTOLIC BLOOD PRESSURE: 82 MMHG | RESPIRATION RATE: 18 BRPM

## 2021-01-01 VITALS
TEMPERATURE: 97.1 F | SYSTOLIC BLOOD PRESSURE: 114 MMHG | DIASTOLIC BLOOD PRESSURE: 55 MMHG | HEART RATE: 90 BPM | RESPIRATION RATE: 18 BRPM | OXYGEN SATURATION: 92 %

## 2021-01-01 VITALS
SYSTOLIC BLOOD PRESSURE: 138 MMHG | BODY MASS INDEX: 24.64 KG/M2 | DIASTOLIC BLOOD PRESSURE: 70 MMHG | HEIGHT: 67 IN | WEIGHT: 157 LBS

## 2021-01-01 VITALS
HEIGHT: 67 IN | HEART RATE: 80 BPM | WEIGHT: 159 LBS | DIASTOLIC BLOOD PRESSURE: 68 MMHG | SYSTOLIC BLOOD PRESSURE: 130 MMHG | BODY MASS INDEX: 24.96 KG/M2

## 2021-01-01 VITALS
HEIGHT: 67 IN | HEART RATE: 88 BPM | SYSTOLIC BLOOD PRESSURE: 136 MMHG | DIASTOLIC BLOOD PRESSURE: 70 MMHG | WEIGHT: 159 LBS | BODY MASS INDEX: 24.96 KG/M2 | TEMPERATURE: 98.7 F

## 2021-01-01 VITALS — HEART RATE: 65 BPM

## 2021-01-01 DIAGNOSIS — K21.9 ACID REFLUX: ICD-10-CM

## 2021-01-01 DIAGNOSIS — I87.2 VENOUS INSUFFICIENCY: ICD-10-CM

## 2021-01-01 DIAGNOSIS — I95.9 HYPOTENSION: ICD-10-CM

## 2021-01-01 DIAGNOSIS — C67.0 MALIGNANT NEOPLASM OF TRIGONE OF URINARY BLADDER (HCC): ICD-10-CM

## 2021-01-01 DIAGNOSIS — N30.01 ACUTE CYSTITIS WITH HEMATURIA: Primary | ICD-10-CM

## 2021-01-01 DIAGNOSIS — E87.6 HYPOKALEMIA: ICD-10-CM

## 2021-01-01 DIAGNOSIS — R31.0 HEMATURIA, GROSS: ICD-10-CM

## 2021-01-01 DIAGNOSIS — I48.21 PERMANENT ATRIAL FIBRILLATION (HCC): ICD-10-CM

## 2021-01-01 DIAGNOSIS — R13.10 DYSPHAGIA, UNSPECIFIED TYPE: ICD-10-CM

## 2021-01-01 DIAGNOSIS — S91.311A LACERATION OF RIGHT FOOT, INITIAL ENCOUNTER: ICD-10-CM

## 2021-01-01 DIAGNOSIS — I70.213 ATHEROSCLEROSIS OF NATIVE ARTERY OF BOTH LOWER EXTREMITIES WITH INTERMITTENT CLAUDICATION (HCC): ICD-10-CM

## 2021-01-01 DIAGNOSIS — R53.1 WEAKNESS: Primary | ICD-10-CM

## 2021-01-01 DIAGNOSIS — C67.4 MALIGNANT NEOPLASM OF POSTERIOR WALL OF BLADDER (HCC): ICD-10-CM

## 2021-01-01 DIAGNOSIS — R31.9 URINARY TRACT INFECTION WITH HEMATURIA, SITE UNSPECIFIED: ICD-10-CM

## 2021-01-01 DIAGNOSIS — I71.4 ABDOMINAL AORTIC ANEURYSM (AAA) WITHOUT RUPTURE (HCC): ICD-10-CM

## 2021-01-01 DIAGNOSIS — N17.9 AKI (ACUTE KIDNEY INJURY) (HCC): ICD-10-CM

## 2021-01-01 DIAGNOSIS — T14.91XA SUICIDE ATTEMPT (HCC): Primary | ICD-10-CM

## 2021-01-01 DIAGNOSIS — N13.8 BENIGN PROSTATIC HYPERPLASIA WITH URINARY OBSTRUCTION: Primary | ICD-10-CM

## 2021-01-01 DIAGNOSIS — C34.90 NON-SMALL CELL LUNG CANCER WITH METASTASIS (HCC): ICD-10-CM

## 2021-01-01 DIAGNOSIS — Z20.822 COVID-19 RULED OUT BY LABORATORY TESTING: ICD-10-CM

## 2021-01-01 DIAGNOSIS — C67.9 MALIGNANT NEOPLASM OF URINARY BLADDER, UNSPECIFIED SITE (HCC): ICD-10-CM

## 2021-01-01 DIAGNOSIS — R35.0 BENIGN PROSTATIC HYPERPLASIA WITH URINARY FREQUENCY: ICD-10-CM

## 2021-01-01 DIAGNOSIS — R30.0 BURNING WITH URINATION: ICD-10-CM

## 2021-01-01 DIAGNOSIS — N40.1 BENIGN PROSTATIC HYPERPLASIA WITH URINARY OBSTRUCTION: Primary | ICD-10-CM

## 2021-01-01 DIAGNOSIS — C34.11 MALIGNANT NEOPLASM OF UPPER LOBE OF RIGHT LUNG (HCC): ICD-10-CM

## 2021-01-01 DIAGNOSIS — R31.0 HEMATURIA, GROSS: Primary | ICD-10-CM

## 2021-01-01 DIAGNOSIS — R31.0 GROSS HEMATURIA: ICD-10-CM

## 2021-01-01 DIAGNOSIS — I65.23 ASYMPTOMATIC BILATERAL CAROTID ARTERY STENOSIS: ICD-10-CM

## 2021-01-01 DIAGNOSIS — Z85.118 HISTORY OF LUNG CANCER: ICD-10-CM

## 2021-01-01 DIAGNOSIS — R31.0 GROSS HEMATURIA: Primary | ICD-10-CM

## 2021-01-01 DIAGNOSIS — I70.213 ATHEROSCLEROSIS OF NATIVE ARTERY OF BOTH LOWER EXTREMITIES WITH INTERMITTENT CLAUDICATION (HCC): Primary | ICD-10-CM

## 2021-01-01 DIAGNOSIS — E83.39 HYPOPHOSPHATEMIA: ICD-10-CM

## 2021-01-01 DIAGNOSIS — N39.0 UTI (URINARY TRACT INFECTION): ICD-10-CM

## 2021-01-01 DIAGNOSIS — I65.23 BILATERAL CAROTID ARTERY STENOSIS: ICD-10-CM

## 2021-01-01 DIAGNOSIS — N30.01 ACUTE CYSTITIS WITH HEMATURIA: ICD-10-CM

## 2021-01-01 DIAGNOSIS — R77.8 ELEVATED TROPONIN: ICD-10-CM

## 2021-01-01 DIAGNOSIS — N32.3 BLADDER DIVERTICULUM: ICD-10-CM

## 2021-01-01 DIAGNOSIS — I65.23 ASYMPTOMATIC BILATERAL CAROTID ARTERY STENOSIS: Primary | ICD-10-CM

## 2021-01-01 DIAGNOSIS — I48.91 ATRIAL FIBRILLATION (HCC): ICD-10-CM

## 2021-01-01 DIAGNOSIS — N39.0 URINARY TRACT INFECTION WITH HEMATURIA, SITE UNSPECIFIED: ICD-10-CM

## 2021-01-01 DIAGNOSIS — R30.0 DYSURIA: Primary | ICD-10-CM

## 2021-01-01 DIAGNOSIS — N40.1 BENIGN PROSTATIC HYPERPLASIA WITH URINARY FREQUENCY: ICD-10-CM

## 2021-01-01 DIAGNOSIS — I73.9 PAD (PERIPHERAL ARTERY DISEASE) (HCC): Primary | ICD-10-CM

## 2021-01-01 DIAGNOSIS — E87.3 METABOLIC ALKALOSIS: ICD-10-CM

## 2021-01-01 DIAGNOSIS — N39.0 URINARY TRACT INFECTION: ICD-10-CM

## 2021-01-01 DIAGNOSIS — I73.9 PAD (PERIPHERAL ARTERY DISEASE) (HCC): ICD-10-CM

## 2021-01-01 DIAGNOSIS — R19.7 DIARRHEA: ICD-10-CM

## 2021-01-01 DIAGNOSIS — Z20.822 COVID-19 RULED OUT BY LABORATORY TESTING: Primary | ICD-10-CM

## 2021-01-01 DIAGNOSIS — Z01.818 OTHER SPECIFIED PRE-OPERATIVE EXAMINATION: ICD-10-CM

## 2021-01-01 DIAGNOSIS — N40.1 BENIGN PROSTATIC HYPERPLASIA WITH LOWER URINARY TRACT SYMPTOMS: ICD-10-CM

## 2021-01-01 DIAGNOSIS — S61.512A WRIST LACERATION, LEFT, INITIAL ENCOUNTER: ICD-10-CM

## 2021-01-01 DIAGNOSIS — E87.6 HYPOKALEMIA: Primary | ICD-10-CM

## 2021-01-01 DIAGNOSIS — N13.8 OTHER OBSTRUCTIVE AND REFLUX UROPATHY: ICD-10-CM

## 2021-01-01 DIAGNOSIS — E78.5 HYPERLIPIDEMIA, UNSPECIFIED HYPERLIPIDEMIA TYPE: ICD-10-CM

## 2021-01-01 DIAGNOSIS — Z01.818 OTHER SPECIFIED PRE-OPERATIVE EXAMINATION: Primary | ICD-10-CM

## 2021-01-01 DIAGNOSIS — I48.11 LONGSTANDING PERSISTENT ATRIAL FIBRILLATION (HCC): ICD-10-CM

## 2021-01-01 DIAGNOSIS — N40.1 BENIGN PROSTATIC HYPERPLASIA WITH URINARY OBSTRUCTION: ICD-10-CM

## 2021-01-01 DIAGNOSIS — S91.312A LACERATION OF LEFT FOOT, INITIAL ENCOUNTER: ICD-10-CM

## 2021-01-01 DIAGNOSIS — R31.9 HEMATURIA: Primary | ICD-10-CM

## 2021-01-01 DIAGNOSIS — R79.89 ELEVATED LACTIC ACID LEVEL: ICD-10-CM

## 2021-01-01 DIAGNOSIS — N13.8 BENIGN PROSTATIC HYPERPLASIA WITH URINARY OBSTRUCTION: ICD-10-CM

## 2021-01-01 DIAGNOSIS — E78.2 MIXED HYPERLIPIDEMIA: ICD-10-CM

## 2021-01-01 DIAGNOSIS — S61.511A LACERATION OF RIGHT WRIST, INITIAL ENCOUNTER: ICD-10-CM

## 2021-01-01 LAB
ABO GROUP BLD BPU: NORMAL
ABO GROUP BLD: NORMAL
ALBUMIN SERPL BCP-MCNC: 2.3 G/DL (ref 3.5–5)
ALBUMIN SERPL BCP-MCNC: 2.5 G/DL (ref 3.5–5)
ALBUMIN SERPL BCP-MCNC: 3 G/DL (ref 3.5–5)
ALBUMIN SERPL BCP-MCNC: 3.1 G/DL (ref 3.5–5)
ALDOST SERPL-MCNC: <1 NG/DL (ref 0–30)
ALP SERPL-CCNC: 101 U/L (ref 46–116)
ALP SERPL-CCNC: 76 U/L (ref 46–116)
ALP SERPL-CCNC: 89 U/L (ref 46–116)
ALP SERPL-CCNC: 91 U/L (ref 46–116)
ALT SERPL W P-5'-P-CCNC: 103 U/L (ref 12–78)
ALT SERPL W P-5'-P-CCNC: 27 U/L (ref 12–78)
ALT SERPL W P-5'-P-CCNC: 41 U/L (ref 12–78)
ALT SERPL W P-5'-P-CCNC: 91 U/L (ref 12–78)
AMPHETAMINES SERPL QL SCN: NEGATIVE
ANION GAP SERPL CALCULATED.3IONS-SCNC: 1 MMOL/L (ref 4–13)
ANION GAP SERPL CALCULATED.3IONS-SCNC: 10 MMOL/L (ref 4–13)
ANION GAP SERPL CALCULATED.3IONS-SCNC: 13 MMOL/L (ref 4–13)
ANION GAP SERPL CALCULATED.3IONS-SCNC: 14 MMOL/L (ref 4–13)
ANION GAP SERPL CALCULATED.3IONS-SCNC: 3 MMOL/L (ref 4–13)
ANION GAP SERPL CALCULATED.3IONS-SCNC: 4 MMOL/L (ref 4–13)
ANION GAP SERPL CALCULATED.3IONS-SCNC: 5 MMOL/L (ref 4–13)
ANION GAP SERPL CALCULATED.3IONS-SCNC: 5 MMOL/L (ref 4–13)
ANION GAP SERPL CALCULATED.3IONS-SCNC: 7 MMOL/L (ref 4–13)
ANION GAP SERPL CALCULATED.3IONS-SCNC: 8 MMOL/L (ref 4–13)
ANION GAP SERPL CALCULATED.3IONS-SCNC: 8 MMOL/L (ref 4–13)
ANION GAP SERPL CALCULATED.3IONS-SCNC: 9 MMOL/L (ref 4–13)
APAP SERPL-MCNC: <2 UG/ML (ref 10–20)
APTT PPP: 26 SECONDS (ref 23–37)
APTT PPP: 43 SECONDS (ref 23–37)
AST SERPL W P-5'-P-CCNC: 23 U/L (ref 5–45)
AST SERPL W P-5'-P-CCNC: 38 U/L (ref 5–45)
AST SERPL W P-5'-P-CCNC: 39 U/L (ref 5–45)
AST SERPL W P-5'-P-CCNC: 77 U/L (ref 5–45)
ATRIAL RATE: 115 BPM
ATRIAL RATE: 118 BPM
ATRIAL RATE: 147 BPM
ATRIAL RATE: 51 BPM
ATRIAL RATE: 63 BPM
ATRIAL RATE: 81 BPM
BACTERIA BLD CULT: NORMAL
BACTERIA BLD CULT: NORMAL
BACTERIA UR CULT: ABNORMAL
BACTERIA UR CULT: NORMAL
BACTERIA UR CULT: NORMAL
BACTERIA UR QL AUTO: ABNORMAL /HPF
BARBITURATES UR QL: NEGATIVE
BASE EX.OXY STD BLDV CALC-SCNC: 77.6 % (ref 60–80)
BASE EXCESS BLDA CALC-SCNC: 4 MMOL/L (ref -2–3)
BASE EXCESS BLDA CALC-SCNC: 6 MMOL/L (ref -2–3)
BASE EXCESS BLDV CALC-SCNC: 25.1 MMOL/L
BASOPHILS # BLD AUTO: 0 THOUSANDS/ΜL (ref 0–0.1)
BASOPHILS # BLD AUTO: 0 THOUSANDS/ΜL (ref 0–0.1)
BASOPHILS # BLD AUTO: 0.01 THOUSANDS/ΜL (ref 0–0.1)
BASOPHILS # BLD AUTO: 0.01 THOUSANDS/ΜL (ref 0–0.1)
BASOPHILS # BLD AUTO: 0.02 THOUSANDS/ΜL (ref 0–0.1)
BASOPHILS # BLD MANUAL: 0 THOUSAND/UL (ref 0–0.1)
BASOPHILS NFR BLD AUTO: 0 % (ref 0–1)
BASOPHILS NFR MAR MANUAL: 0 % (ref 0–1)
BENZODIAZ UR QL: NEGATIVE
BILIRUB SERPL-MCNC: 0.18 MG/DL (ref 0.2–1)
BILIRUB SERPL-MCNC: 1.31 MG/DL (ref 0.2–1)
BILIRUB SERPL-MCNC: 1.7 MG/DL (ref 0.2–1)
BILIRUB SERPL-MCNC: 2.04 MG/DL (ref 0.2–1)
BILIRUB UR QL STRIP: NEGATIVE
BLD GP AB SCN SERPL QL: NEGATIVE
BLD GP AB SCN SERPL QL: NEGATIVE
BPU ID: NORMAL
BUN SERPL-MCNC: 12 MG/DL (ref 5–25)
BUN SERPL-MCNC: 16 MG/DL (ref 5–25)
BUN SERPL-MCNC: 16 MG/DL (ref 5–25)
BUN SERPL-MCNC: 18 MG/DL (ref 5–25)
BUN SERPL-MCNC: 20 MG/DL (ref 5–25)
BUN SERPL-MCNC: 20 MG/DL (ref 5–25)
BUN SERPL-MCNC: 21 MG/DL (ref 5–25)
BUN SERPL-MCNC: 22 MG/DL (ref 5–25)
BUN SERPL-MCNC: 23 MG/DL (ref 5–25)
BUN SERPL-MCNC: 23 MG/DL (ref 5–25)
BUN SERPL-MCNC: 24 MG/DL (ref 5–25)
BUN SERPL-MCNC: 25 MG/DL (ref 5–25)
BUN SERPL-MCNC: 26 MG/DL (ref 5–25)
BUN SERPL-MCNC: 27 MG/DL (ref 5–25)
BUN SERPL-MCNC: 28 MG/DL (ref 5–25)
BUN SERPL-MCNC: 31 MG/DL (ref 5–25)
BUN SERPL-MCNC: 35 MG/DL (ref 5–25)
BUN SERPL-MCNC: 36 MG/DL (ref 5–25)
BUN SERPL-MCNC: 37 MG/DL (ref 5–25)
C DIFF TOX GENS STL QL NAA+PROBE: NEGATIVE
CA-I BLD-SCNC: 1.18 MMOL/L (ref 1.12–1.32)
CA-I BLD-SCNC: 1.22 MMOL/L (ref 1.12–1.32)
CALCIUM ALBUM COR SERPL-MCNC: 9.3 MG/DL (ref 8.3–10.1)
CALCIUM ALBUM COR SERPL-MCNC: 9.4 MG/DL (ref 8.3–10.1)
CALCIUM ALBUM COR SERPL-MCNC: 9.4 MG/DL (ref 8.3–10.1)
CALCIUM ALBUM COR SERPL-MCNC: 9.5 MG/DL (ref 8.3–10.1)
CALCIUM SERPL-MCNC: 7.5 MG/DL (ref 8.3–10.1)
CALCIUM SERPL-MCNC: 7.6 MG/DL (ref 8.3–10.1)
CALCIUM SERPL-MCNC: 7.6 MG/DL (ref 8.3–10.1)
CALCIUM SERPL-MCNC: 7.7 MG/DL (ref 8.3–10.1)
CALCIUM SERPL-MCNC: 7.8 MG/DL (ref 8.3–10.1)
CALCIUM SERPL-MCNC: 7.9 MG/DL (ref 8.3–10.1)
CALCIUM SERPL-MCNC: 7.9 MG/DL (ref 8.3–10.1)
CALCIUM SERPL-MCNC: 8 MG/DL (ref 8.3–10.1)
CALCIUM SERPL-MCNC: 8.2 MG/DL (ref 8.3–10.1)
CALCIUM SERPL-MCNC: 8.3 MG/DL (ref 8.3–10.1)
CALCIUM SERPL-MCNC: 8.5 MG/DL (ref 8.3–10.1)
CALCIUM SERPL-MCNC: 8.6 MG/DL (ref 8.3–10.1)
CALCIUM SERPL-MCNC: 8.7 MG/DL (ref 8.3–10.1)
CAMPYLOBACTER DNA SPEC NAA+PROBE: NORMAL
CAOX CRY URNS QL MICRO: ABNORMAL /HPF
CHLORIDE SERPL-SCNC: 102 MMOL/L (ref 100–108)
CHLORIDE SERPL-SCNC: 103 MMOL/L (ref 100–108)
CHLORIDE SERPL-SCNC: 103 MMOL/L (ref 100–108)
CHLORIDE SERPL-SCNC: 104 MMOL/L (ref 100–108)
CHLORIDE SERPL-SCNC: 105 MMOL/L (ref 100–108)
CHLORIDE SERPL-SCNC: 106 MMOL/L (ref 100–108)
CHLORIDE SERPL-SCNC: 106 MMOL/L (ref 100–108)
CHLORIDE SERPL-SCNC: 107 MMOL/L (ref 100–108)
CHLORIDE SERPL-SCNC: 107 MMOL/L (ref 100–108)
CHLORIDE SERPL-SCNC: 108 MMOL/L (ref 100–108)
CHLORIDE SERPL-SCNC: 81 MMOL/L (ref 100–108)
CHLORIDE SERPL-SCNC: 83 MMOL/L (ref 100–108)
CHLORIDE SERPL-SCNC: 83 MMOL/L (ref 100–108)
CHLORIDE SERPL-SCNC: 84 MMOL/L (ref 100–108)
CHLORIDE SERPL-SCNC: 90 MMOL/L (ref 100–108)
CHLORIDE SERPL-SCNC: 92 MMOL/L (ref 100–108)
CHLORIDE SERPL-SCNC: 97 MMOL/L (ref 100–108)
CHLORIDE SERPL-SCNC: 99 MMOL/L (ref 100–108)
CHLORIDE UR-SCNC: 15 MMOL/L
CLARITY UR: ABNORMAL
CLARITY UR: CLEAR
CO2 SERPL-SCNC: 23 MMOL/L (ref 21–32)
CO2 SERPL-SCNC: 24 MMOL/L (ref 21–32)
CO2 SERPL-SCNC: 26 MMOL/L (ref 21–32)
CO2 SERPL-SCNC: 27 MMOL/L (ref 21–32)
CO2 SERPL-SCNC: 28 MMOL/L (ref 21–32)
CO2 SERPL-SCNC: 29 MMOL/L (ref 21–32)
CO2 SERPL-SCNC: 30 MMOL/L (ref 21–32)
CO2 SERPL-SCNC: 32 MMOL/L (ref 21–32)
CO2 SERPL-SCNC: 32 MMOL/L (ref 21–32)
CO2 SERPL-SCNC: 33 MMOL/L (ref 21–32)
CO2 SERPL-SCNC: 35 MMOL/L (ref 21–32)
CO2 SERPL-SCNC: 36 MMOL/L (ref 21–32)
CO2 SERPL-SCNC: 40 MMOL/L (ref 21–32)
CO2 SERPL-SCNC: >45 MMOL/L (ref 21–32)
COCAINE UR QL: NEGATIVE
COLOR UR: ABNORMAL
COLOR UR: YELLOW
COLOR UR: YELLOW
CREAT SERPL-MCNC: 0.51 MG/DL (ref 0.6–1.3)
CREAT SERPL-MCNC: 0.55 MG/DL (ref 0.6–1.3)
CREAT SERPL-MCNC: 0.56 MG/DL (ref 0.6–1.3)
CREAT SERPL-MCNC: 0.63 MG/DL (ref 0.6–1.3)
CREAT SERPL-MCNC: 0.65 MG/DL (ref 0.6–1.3)
CREAT SERPL-MCNC: 0.72 MG/DL (ref 0.6–1.3)
CREAT SERPL-MCNC: 0.72 MG/DL (ref 0.6–1.3)
CREAT SERPL-MCNC: 0.74 MG/DL (ref 0.6–1.3)
CREAT SERPL-MCNC: 0.76 MG/DL (ref 0.6–1.3)
CREAT SERPL-MCNC: 0.77 MG/DL (ref 0.6–1.3)
CREAT SERPL-MCNC: 0.77 MG/DL (ref 0.6–1.3)
CREAT SERPL-MCNC: 0.78 MG/DL (ref 0.6–1.3)
CREAT SERPL-MCNC: 0.81 MG/DL (ref 0.6–1.3)
CREAT SERPL-MCNC: 0.83 MG/DL (ref 0.6–1.3)
CREAT SERPL-MCNC: 0.86 MG/DL (ref 0.6–1.3)
CREAT SERPL-MCNC: 0.93 MG/DL (ref 0.6–1.3)
CREAT SERPL-MCNC: 0.95 MG/DL (ref 0.6–1.3)
CREAT SERPL-MCNC: 1.02 MG/DL (ref 0.6–1.3)
CREAT SERPL-MCNC: 1.04 MG/DL (ref 0.6–1.3)
CREAT SERPL-MCNC: 1.04 MG/DL (ref 0.6–1.3)
CREAT SERPL-MCNC: 1.2 MG/DL (ref 0.6–1.3)
CREAT SERPL-MCNC: 1.21 MG/DL (ref 0.6–1.3)
CREAT SERPL-MCNC: 1.26 MG/DL (ref 0.6–1.3)
CREAT SERPL-MCNC: 1.33 MG/DL (ref 0.6–1.3)
CREAT SERPL-MCNC: 1.43 MG/DL (ref 0.6–1.3)
CREAT UR-MCNC: 98.1 MG/DL
CROSSMATCH: NORMAL
EOSINOPHIL # BLD AUTO: 0 THOUSAND/ΜL (ref 0–0.61)
EOSINOPHIL # BLD AUTO: 0 THOUSAND/ΜL (ref 0–0.61)
EOSINOPHIL # BLD AUTO: 0.01 THOUSAND/ΜL (ref 0–0.61)
EOSINOPHIL # BLD AUTO: 0.02 THOUSAND/ΜL (ref 0–0.61)
EOSINOPHIL # BLD AUTO: 0.04 THOUSAND/ΜL (ref 0–0.61)
EOSINOPHIL # BLD AUTO: 0.04 THOUSAND/ΜL (ref 0–0.61)
EOSINOPHIL # BLD AUTO: 0.07 THOUSAND/ΜL (ref 0–0.61)
EOSINOPHIL # BLD MANUAL: 0 THOUSAND/UL (ref 0–0.4)
EOSINOPHIL NFR BLD AUTO: 0 % (ref 0–6)
EOSINOPHIL NFR BLD AUTO: 1 % (ref 0–6)
EOSINOPHIL NFR BLD MANUAL: 0 % (ref 0–6)
ERYTHROCYTE [DISTWIDTH] IN BLOOD BY AUTOMATED COUNT: 13.8 % (ref 11.6–15.1)
ERYTHROCYTE [DISTWIDTH] IN BLOOD BY AUTOMATED COUNT: 14.4 % (ref 11.6–15.1)
ERYTHROCYTE [DISTWIDTH] IN BLOOD BY AUTOMATED COUNT: 15 % (ref 11.6–15.1)
ERYTHROCYTE [DISTWIDTH] IN BLOOD BY AUTOMATED COUNT: 15 % (ref 11.6–15.1)
ERYTHROCYTE [DISTWIDTH] IN BLOOD BY AUTOMATED COUNT: 15.4 % (ref 11.6–15.1)
ERYTHROCYTE [DISTWIDTH] IN BLOOD BY AUTOMATED COUNT: 15.5 % (ref 11.6–15.1)
ERYTHROCYTE [DISTWIDTH] IN BLOOD BY AUTOMATED COUNT: 15.7 % (ref 11.6–15.1)
ERYTHROCYTE [DISTWIDTH] IN BLOOD BY AUTOMATED COUNT: 15.9 % (ref 11.6–15.1)
ERYTHROCYTE [DISTWIDTH] IN BLOOD BY AUTOMATED COUNT: 16.1 % (ref 11.6–15.1)
ERYTHROCYTE [DISTWIDTH] IN BLOOD BY AUTOMATED COUNT: 16.6 % (ref 11.6–15.1)
ERYTHROCYTE [DISTWIDTH] IN BLOOD BY AUTOMATED COUNT: 18.6 % (ref 11.6–15.1)
ERYTHROCYTE [DISTWIDTH] IN BLOOD BY AUTOMATED COUNT: 18.7 % (ref 11.6–15.1)
ERYTHROCYTE [DISTWIDTH] IN BLOOD BY AUTOMATED COUNT: 19 % (ref 11.6–15.1)
ERYTHROCYTE [DISTWIDTH] IN BLOOD BY AUTOMATED COUNT: 19.3 % (ref 11.6–15.1)
ETHANOL SERPL-MCNC: <3 MG/DL (ref 0–3)
GFR SERPL CREATININE-BSD FRML MDRD: 44 ML/MIN/1.73SQ M
GFR SERPL CREATININE-BSD FRML MDRD: 48 ML/MIN/1.73SQ M
GFR SERPL CREATININE-BSD FRML MDRD: 51 ML/MIN/1.73SQ M
GFR SERPL CREATININE-BSD FRML MDRD: 54 ML/MIN/1.73SQ M
GFR SERPL CREATININE-BSD FRML MDRD: 54 ML/MIN/1.73SQ M
GFR SERPL CREATININE-BSD FRML MDRD: 65 ML/MIN/1.73SQ M
GFR SERPL CREATININE-BSD FRML MDRD: 65 ML/MIN/1.73SQ M
GFR SERPL CREATININE-BSD FRML MDRD: 66 ML/MIN/1.73SQ M
GFR SERPL CREATININE-BSD FRML MDRD: 72 ML/MIN/1.73SQ M
GFR SERPL CREATININE-BSD FRML MDRD: 74 ML/MIN/1.73SQ M
GFR SERPL CREATININE-BSD FRML MDRD: 79 ML/MIN/1.73SQ M
GFR SERPL CREATININE-BSD FRML MDRD: 80 ML/MIN/1.73SQ M
GFR SERPL CREATININE-BSD FRML MDRD: 80 ML/MIN/1.73SQ M
GFR SERPL CREATININE-BSD FRML MDRD: 82 ML/MIN/1.73SQ M
GFR SERPL CREATININE-BSD FRML MDRD: 83 ML/MIN/1.73SQ M
GFR SERPL CREATININE-BSD FRML MDRD: 84 ML/MIN/1.73SQ M
GFR SERPL CREATININE-BSD FRML MDRD: 88 ML/MIN/1.73SQ M
GFR SERPL CREATININE-BSD FRML MDRD: 89 ML/MIN/1.73SQ M
GFR SERPL CREATININE-BSD FRML MDRD: 94 ML/MIN/1.73SQ M
GFR SERPL CREATININE-BSD FRML MDRD: 94 ML/MIN/1.73SQ M
GFR SERPL CREATININE-BSD FRML MDRD: 97 ML/MIN/1.73SQ M
GLUCOSE P FAST SERPL-MCNC: 97 MG/DL (ref 65–99)
GLUCOSE SERPL-MCNC: 100 MG/DL (ref 65–140)
GLUCOSE SERPL-MCNC: 101 MG/DL (ref 65–140)
GLUCOSE SERPL-MCNC: 105 MG/DL (ref 65–140)
GLUCOSE SERPL-MCNC: 110 MG/DL (ref 65–140)
GLUCOSE SERPL-MCNC: 110 MG/DL (ref 65–140)
GLUCOSE SERPL-MCNC: 112 MG/DL (ref 65–140)
GLUCOSE SERPL-MCNC: 118 MG/DL (ref 65–140)
GLUCOSE SERPL-MCNC: 120 MG/DL (ref 65–140)
GLUCOSE SERPL-MCNC: 128 MG/DL (ref 65–140)
GLUCOSE SERPL-MCNC: 129 MG/DL (ref 65–140)
GLUCOSE SERPL-MCNC: 132 MG/DL (ref 65–140)
GLUCOSE SERPL-MCNC: 139 MG/DL (ref 65–140)
GLUCOSE SERPL-MCNC: 142 MG/DL (ref 65–140)
GLUCOSE SERPL-MCNC: 144 MG/DL (ref 65–140)
GLUCOSE SERPL-MCNC: 153 MG/DL (ref 65–140)
GLUCOSE SERPL-MCNC: 189 MG/DL (ref 65–140)
GLUCOSE SERPL-MCNC: 202 MG/DL (ref 65–140)
GLUCOSE SERPL-MCNC: 71 MG/DL (ref 65–140)
GLUCOSE SERPL-MCNC: 89 MG/DL (ref 65–140)
GLUCOSE SERPL-MCNC: 91 MG/DL (ref 65–140)
GLUCOSE SERPL-MCNC: 95 MG/DL (ref 65–140)
GLUCOSE SERPL-MCNC: 97 MG/DL (ref 65–140)
GLUCOSE SERPL-MCNC: 98 MG/DL (ref 65–140)
GLUCOSE UR STRIP-MCNC: NEGATIVE MG/DL
HCO3 BLDA-SCNC: 28.7 MMOL/L (ref 24–30)
HCO3 BLDA-SCNC: 30.1 MMOL/L (ref 24–30)
HCO3 BLDV-SCNC: 51.7 MMOL/L (ref 24–30)
HCT VFR BLD AUTO: 23.1 % (ref 36.5–49.3)
HCT VFR BLD AUTO: 23.8 % (ref 36.5–49.3)
HCT VFR BLD AUTO: 24.4 % (ref 36.5–49.3)
HCT VFR BLD AUTO: 25.7 % (ref 36.5–49.3)
HCT VFR BLD AUTO: 25.8 % (ref 36.5–49.3)
HCT VFR BLD AUTO: 25.9 % (ref 36.5–49.3)
HCT VFR BLD AUTO: 26.7 % (ref 36.5–49.3)
HCT VFR BLD AUTO: 26.8 % (ref 36.5–49.3)
HCT VFR BLD AUTO: 26.9 % (ref 36.5–49.3)
HCT VFR BLD AUTO: 26.9 % (ref 36.5–49.3)
HCT VFR BLD AUTO: 27.5 % (ref 36.5–49.3)
HCT VFR BLD AUTO: 28.3 % (ref 36.5–49.3)
HCT VFR BLD AUTO: 28.8 % (ref 36.5–49.3)
HCT VFR BLD AUTO: 31.4 % (ref 36.5–49.3)
HCT VFR BLD AUTO: 34.7 % (ref 36.5–49.3)
HCT VFR BLD AUTO: 38 % (ref 36.5–49.3)
HCT VFR BLD AUTO: 38 % (ref 36.5–49.3)
HCT VFR BLD AUTO: 38.8 % (ref 36.5–49.3)
HCT VFR BLD CALC: 26 % (ref 36.5–49.3)
HCT VFR BLD CALC: 28 % (ref 36.5–49.3)
HGB BLD-MCNC: 10.3 G/DL (ref 12–17)
HGB BLD-MCNC: 11.9 G/DL (ref 12–17)
HGB BLD-MCNC: 12.7 G/DL (ref 12–17)
HGB BLD-MCNC: 13.1 G/DL (ref 12–17)
HGB BLD-MCNC: 13.3 G/DL (ref 12–17)
HGB BLD-MCNC: 7.7 G/DL (ref 12–17)
HGB BLD-MCNC: 7.9 G/DL (ref 12–17)
HGB BLD-MCNC: 8.2 G/DL (ref 12–17)
HGB BLD-MCNC: 8.7 G/DL (ref 12–17)
HGB BLD-MCNC: 8.8 G/DL (ref 12–17)
HGB BLD-MCNC: 8.8 G/DL (ref 12–17)
HGB BLD-MCNC: 9 G/DL (ref 12–17)
HGB BLD-MCNC: 9.2 G/DL (ref 12–17)
HGB BLD-MCNC: 9.4 G/DL (ref 12–17)
HGB BLD-MCNC: 9.6 G/DL (ref 12–17)
HGB BLD-MCNC: 9.7 G/DL (ref 12–17)
HGB BLDA-MCNC: 8.8 G/DL (ref 12–17)
HGB BLDA-MCNC: 9.5 G/DL (ref 12–17)
HGB UR QL STRIP.AUTO: ABNORMAL
HGB UR QL STRIP.AUTO: NEGATIVE
HGB UR QL STRIP.AUTO: NEGATIVE
HOLD SPECIMEN: NORMAL
HYALINE CASTS #/AREA URNS LPF: ABNORMAL /LPF
IMM GRANULOCYTES # BLD AUTO: 0.02 THOUSAND/UL (ref 0–0.2)
IMM GRANULOCYTES # BLD AUTO: 0.02 THOUSAND/UL (ref 0–0.2)
IMM GRANULOCYTES # BLD AUTO: 0.03 THOUSAND/UL (ref 0–0.2)
IMM GRANULOCYTES # BLD AUTO: 0.03 THOUSAND/UL (ref 0–0.2)
IMM GRANULOCYTES # BLD AUTO: 0.04 THOUSAND/UL (ref 0–0.2)
IMM GRANULOCYTES # BLD AUTO: 0.05 THOUSAND/UL (ref 0–0.2)
IMM GRANULOCYTES # BLD AUTO: 0.06 THOUSAND/UL (ref 0–0.2)
IMM GRANULOCYTES NFR BLD AUTO: 0 % (ref 0–2)
IMM GRANULOCYTES NFR BLD AUTO: 1 % (ref 0–2)
INR PPP: 1.12 (ref 0.84–1.19)
INR PPP: 1.26 (ref 0.84–1.19)
KETONES UR STRIP-MCNC: ABNORMAL MG/DL
KETONES UR STRIP-MCNC: ABNORMAL MG/DL
KETONES UR STRIP-MCNC: NEGATIVE MG/DL
LACTATE SERPL-SCNC: 1.7 MMOL/L (ref 0.5–2)
LACTATE SERPL-SCNC: 2.1 MMOL/L (ref 0.5–2)
LACTATE SERPL-SCNC: 2.3 MMOL/L (ref 0.5–2)
LACTATE SERPL-SCNC: 3.2 MMOL/L (ref 0.5–2)
LACTATE SERPL-SCNC: 3.2 MMOL/L (ref 0.5–2)
LACTATE SERPL-SCNC: 3.5 MMOL/L (ref 0.5–2)
LACTATE SERPL-SCNC: 3.7 MMOL/L (ref 0.5–2)
LACTATE SERPL-SCNC: 3.8 MMOL/L (ref 0.5–2)
LACTATE SERPL-SCNC: 4.6 MMOL/L (ref 0.5–2)
LEUKOCYTE ESTERASE UR QL STRIP: ABNORMAL
LEUKOCYTE ESTERASE UR QL STRIP: NEGATIVE
LIPASE SERPL-CCNC: 70 U/L (ref 73–393)
LYMPHOCYTES # BLD AUTO: 0.41 THOUSANDS/ΜL (ref 0.6–4.47)
LYMPHOCYTES # BLD AUTO: 0.69 THOUSANDS/ΜL (ref 0.6–4.47)
LYMPHOCYTES # BLD AUTO: 0.8 THOUSANDS/ΜL (ref 0.6–4.47)
LYMPHOCYTES # BLD AUTO: 0.9 THOUSANDS/ΜL (ref 0.6–4.47)
LYMPHOCYTES # BLD AUTO: 0.91 THOUSANDS/ΜL (ref 0.6–4.47)
LYMPHOCYTES # BLD AUTO: 0.94 THOUSANDS/ΜL (ref 0.6–4.47)
LYMPHOCYTES # BLD AUTO: 1.28 THOUSANDS/ΜL (ref 0.6–4.47)
LYMPHOCYTES # BLD AUTO: 1.69 THOUSAND/UL (ref 0.6–4.47)
LYMPHOCYTES # BLD AUTO: 17 % (ref 14–44)
LYMPHOCYTES NFR BLD AUTO: 13 % (ref 14–44)
LYMPHOCYTES NFR BLD AUTO: 13 % (ref 14–44)
LYMPHOCYTES NFR BLD AUTO: 16 % (ref 14–44)
LYMPHOCYTES NFR BLD AUTO: 19 % (ref 14–44)
LYMPHOCYTES NFR BLD AUTO: 29 % (ref 14–44)
LYMPHOCYTES NFR BLD AUTO: 6 % (ref 14–44)
LYMPHOCYTES NFR BLD AUTO: 8 % (ref 14–44)
MAGNESIUM SERPL-MCNC: 1.7 MG/DL (ref 1.6–2.6)
MAGNESIUM SERPL-MCNC: 1.7 MG/DL (ref 1.6–2.6)
MAGNESIUM SERPL-MCNC: 2 MG/DL (ref 1.6–2.6)
MAGNESIUM SERPL-MCNC: 2 MG/DL (ref 1.6–2.6)
MAGNESIUM SERPL-MCNC: 2.5 MG/DL (ref 1.6–2.6)
MAGNESIUM SERPL-MCNC: 2.7 MG/DL (ref 1.6–2.6)
MAGNESIUM SERPL-MCNC: 3.2 MG/DL (ref 1.6–2.6)
MAGNESIUM SERPL-MCNC: 3.3 MG/DL (ref 1.6–2.6)
MCH RBC QN AUTO: 32 PG (ref 26.8–34.3)
MCH RBC QN AUTO: 32.1 PG (ref 26.8–34.3)
MCH RBC QN AUTO: 32.6 PG (ref 26.8–34.3)
MCH RBC QN AUTO: 32.8 PG (ref 26.8–34.3)
MCH RBC QN AUTO: 32.9 PG (ref 26.8–34.3)
MCH RBC QN AUTO: 33.1 PG (ref 26.8–34.3)
MCH RBC QN AUTO: 33.1 PG (ref 26.8–34.3)
MCH RBC QN AUTO: 33.2 PG (ref 26.8–34.3)
MCH RBC QN AUTO: 33.3 PG (ref 26.8–34.3)
MCH RBC QN AUTO: 33.6 PG (ref 26.8–34.3)
MCH RBC QN AUTO: 34 PG (ref 26.8–34.3)
MCH RBC QN AUTO: 34.3 PG (ref 26.8–34.3)
MCHC RBC AUTO-ENTMCNC: 32.4 G/DL (ref 31.4–37.4)
MCHC RBC AUTO-ENTMCNC: 32.8 G/DL (ref 31.4–37.4)
MCHC RBC AUTO-ENTMCNC: 33.3 G/DL (ref 31.4–37.4)
MCHC RBC AUTO-ENTMCNC: 33.4 G/DL (ref 31.4–37.4)
MCHC RBC AUTO-ENTMCNC: 33.5 G/DL (ref 31.4–37.4)
MCHC RBC AUTO-ENTMCNC: 33.7 G/DL (ref 31.4–37.4)
MCHC RBC AUTO-ENTMCNC: 33.8 G/DL (ref 31.4–37.4)
MCHC RBC AUTO-ENTMCNC: 33.9 G/DL (ref 31.4–37.4)
MCHC RBC AUTO-ENTMCNC: 33.9 G/DL (ref 31.4–37.4)
MCHC RBC AUTO-ENTMCNC: 34.2 G/DL (ref 31.4–37.4)
MCHC RBC AUTO-ENTMCNC: 34.3 G/DL (ref 31.4–37.4)
MCHC RBC AUTO-ENTMCNC: 34.5 G/DL (ref 31.4–37.4)
MCHC RBC AUTO-ENTMCNC: 35 G/DL (ref 31.4–37.4)
MCHC RBC AUTO-ENTMCNC: 35.1 G/DL (ref 31.4–37.4)
MCV RBC AUTO: 100 FL (ref 82–98)
MCV RBC AUTO: 101 FL (ref 82–98)
MCV RBC AUTO: 103 FL (ref 82–98)
MCV RBC AUTO: 94 FL (ref 82–98)
MCV RBC AUTO: 95 FL (ref 82–98)
MCV RBC AUTO: 95 FL (ref 82–98)
MCV RBC AUTO: 96 FL (ref 82–98)
MCV RBC AUTO: 97 FL (ref 82–98)
MCV RBC AUTO: 98 FL (ref 82–98)
MCV RBC AUTO: 98 FL (ref 82–98)
MCV RBC AUTO: 99 FL (ref 82–98)
MCV RBC AUTO: 99 FL (ref 82–98)
METHADONE UR QL: NEGATIVE
MONOCYTES # BLD AUTO: 0.32 THOUSAND/ΜL (ref 0.17–1.22)
MONOCYTES # BLD AUTO: 0.34 THOUSAND/ΜL (ref 0.17–1.22)
MONOCYTES # BLD AUTO: 0.43 THOUSAND/ΜL (ref 0.17–1.22)
MONOCYTES # BLD AUTO: 0.49 THOUSAND/ΜL (ref 0.17–1.22)
MONOCYTES # BLD AUTO: 0.51 THOUSAND/ΜL (ref 0.17–1.22)
MONOCYTES # BLD AUTO: 0.56 THOUSAND/ΜL (ref 0.17–1.22)
MONOCYTES # BLD AUTO: 0.75 THOUSAND/ΜL (ref 0.17–1.22)
MONOCYTES # BLD AUTO: 0.9 THOUSAND/UL (ref 0–1.22)
MONOCYTES NFR BLD AUTO: 11 % (ref 4–12)
MONOCYTES NFR BLD AUTO: 6 % (ref 4–12)
MONOCYTES NFR BLD AUTO: 7 % (ref 4–12)
MONOCYTES NFR BLD AUTO: 8 % (ref 4–12)
MONOCYTES NFR BLD AUTO: 9 % (ref 4–12)
MONOCYTES NFR BLD: 9 % (ref 4–12)
NEUTROPHILS # BLD AUTO: 2.7 THOUSANDS/ΜL (ref 1.85–7.62)
NEUTROPHILS # BLD AUTO: 3.26 THOUSANDS/ΜL (ref 1.85–7.62)
NEUTROPHILS # BLD AUTO: 3.78 THOUSANDS/ΜL (ref 1.85–7.62)
NEUTROPHILS # BLD AUTO: 5.07 THOUSANDS/ΜL (ref 1.85–7.62)
NEUTROPHILS # BLD AUTO: 5.73 THOUSANDS/ΜL (ref 1.85–7.62)
NEUTROPHILS # BLD AUTO: 5.88 THOUSANDS/ΜL (ref 1.85–7.62)
NEUTROPHILS # BLD AUTO: 7.02 THOUSANDS/ΜL (ref 1.85–7.62)
NEUTROPHILS # BLD MANUAL: 7.37 THOUSAND/UL (ref 1.85–7.62)
NEUTS BAND NFR BLD MANUAL: 35 % (ref 0–8)
NEUTS SEG NFR BLD AUTO: 39 % (ref 43–75)
NEUTS SEG NFR BLD AUTO: 62 % (ref 43–75)
NEUTS SEG NFR BLD AUTO: 71 % (ref 43–75)
NEUTS SEG NFR BLD AUTO: 75 % (ref 43–75)
NEUTS SEG NFR BLD AUTO: 77 % (ref 43–75)
NEUTS SEG NFR BLD AUTO: 79 % (ref 43–75)
NEUTS SEG NFR BLD AUTO: 85 % (ref 43–75)
NEUTS SEG NFR BLD AUTO: 85 % (ref 43–75)
NITRITE UR QL STRIP: NEGATIVE
NITRITE UR QL STRIP: POSITIVE
NON-SQ EPI CELLS URNS QL MICRO: ABNORMAL /HPF
NRBC BLD AUTO-RTO: 0 /100 WBCS
O2 CT BLDV-SCNC: 14.4 ML/DL
OPIATES UR QL SCN: NEGATIVE
OTHER STN SPEC: ABNORMAL
OXYCODONE+OXYMORPHONE UR QL SCN: NEGATIVE
P AXIS: 33 DEGREES
P AXIS: 47 DEGREES
P AXIS: 89 DEGREES
PCO2 BLD: 30 MMOL/L (ref 21–32)
PCO2 BLD: 31 MMOL/L (ref 21–32)
PCO2 BLD: 41.9 MM HG (ref 42–50)
PCO2 BLD: 42.2 MM HG (ref 42–50)
PCO2 BLDV: 60.4 MM HG (ref 42–50)
PCP UR QL: NEGATIVE
PH BLD: 7.44 [PH] (ref 7.3–7.4)
PH BLD: 7.46 [PH] (ref 7.3–7.4)
PH BLDV: 7.55 [PH] (ref 7.3–7.4)
PH UR STRIP.AUTO: 6.5 [PH]
PH UR STRIP.AUTO: 7 [PH]
PH UR STRIP.AUTO: 7.5 [PH]
PH UR STRIP.AUTO: 7.5 [PH] (ref 4.5–8)
PHOSPHATE SERPL-MCNC: 1.3 MG/DL (ref 2.3–4.1)
PHOSPHATE SERPL-MCNC: 2 MG/DL (ref 2.3–4.1)
PHOSPHATE SERPL-MCNC: 2.7 MG/DL (ref 2.3–4.1)
PHOSPHATE SERPL-MCNC: 3.1 MG/DL (ref 2.3–4.1)
PHOSPHATE SERPL-MCNC: 3.2 MG/DL (ref 2.3–4.1)
PHOSPHATE SERPL-MCNC: 3.4 MG/DL (ref 2.3–4.1)
PHOSPHATE SERPL-MCNC: 3.7 MG/DL (ref 2.3–4.1)
PLATELET # BLD AUTO: 107 THOUSANDS/UL (ref 149–390)
PLATELET # BLD AUTO: 113 THOUSANDS/UL (ref 149–390)
PLATELET # BLD AUTO: 116 THOUSANDS/UL (ref 149–390)
PLATELET # BLD AUTO: 118 THOUSANDS/UL (ref 149–390)
PLATELET # BLD AUTO: 124 THOUSANDS/UL (ref 149–390)
PLATELET # BLD AUTO: 133 THOUSANDS/UL (ref 149–390)
PLATELET # BLD AUTO: 158 THOUSANDS/UL (ref 149–390)
PLATELET # BLD AUTO: 164 THOUSANDS/UL (ref 149–390)
PLATELET # BLD AUTO: 166 THOUSANDS/UL (ref 149–390)
PLATELET # BLD AUTO: 167 THOUSANDS/UL (ref 149–390)
PLATELET # BLD AUTO: 172 THOUSANDS/UL (ref 149–390)
PLATELET # BLD AUTO: 176 THOUSANDS/UL (ref 149–390)
PLATELET # BLD AUTO: 210 THOUSANDS/UL (ref 149–390)
PLATELET # BLD AUTO: 82 THOUSANDS/UL (ref 149–390)
PLATELET # BLD AUTO: 90 THOUSANDS/UL (ref 149–390)
PLATELET # BLD AUTO: 92 THOUSANDS/UL (ref 149–390)
PLATELET BLD QL SMEAR: ADEQUATE
PMV BLD AUTO: 10 FL (ref 8.9–12.7)
PMV BLD AUTO: 10.1 FL (ref 8.9–12.7)
PMV BLD AUTO: 10.3 FL (ref 8.9–12.7)
PMV BLD AUTO: 10.3 FL (ref 8.9–12.7)
PMV BLD AUTO: 10.4 FL (ref 8.9–12.7)
PMV BLD AUTO: 10.5 FL (ref 8.9–12.7)
PMV BLD AUTO: 10.7 FL (ref 8.9–12.7)
PMV BLD AUTO: 11.4 FL (ref 8.9–12.7)
PMV BLD AUTO: 9.4 FL (ref 8.9–12.7)
PMV BLD AUTO: 9.5 FL (ref 8.9–12.7)
PMV BLD AUTO: 9.9 FL (ref 8.9–12.7)
PO2 BLD: 43 MM HG (ref 35–45)
PO2 BLD: 53 MM HG (ref 35–45)
PO2 BLDV: 41 MM HG (ref 35–45)
POST-VOID RESIDUAL VOLUME, ML POC: 239 ML
POTASSIUM BLD-SCNC: 3.1 MMOL/L (ref 3.5–5.3)
POTASSIUM BLD-SCNC: 3.5 MMOL/L (ref 3.5–5.3)
POTASSIUM SERPL-SCNC: 1.5 MMOL/L (ref 3.5–5.3)
POTASSIUM SERPL-SCNC: 1.5 MMOL/L (ref 3.5–5.3)
POTASSIUM SERPL-SCNC: 1.9 MMOL/L (ref 3.5–5.3)
POTASSIUM SERPL-SCNC: 2.1 MMOL/L (ref 3.5–5.3)
POTASSIUM SERPL-SCNC: 2.5 MMOL/L (ref 3.5–5.3)
POTASSIUM SERPL-SCNC: 2.5 MMOL/L (ref 3.5–5.3)
POTASSIUM SERPL-SCNC: 2.8 MMOL/L (ref 3.5–5.3)
POTASSIUM SERPL-SCNC: 2.8 MMOL/L (ref 3.5–5.3)
POTASSIUM SERPL-SCNC: 2.9 MMOL/L (ref 3.5–5.3)
POTASSIUM SERPL-SCNC: 2.9 MMOL/L (ref 3.5–5.3)
POTASSIUM SERPL-SCNC: 3.1 MMOL/L (ref 3.5–5.3)
POTASSIUM SERPL-SCNC: 3.2 MMOL/L (ref 3.5–5.3)
POTASSIUM SERPL-SCNC: 3.3 MMOL/L (ref 3.5–5.3)
POTASSIUM SERPL-SCNC: 3.4 MMOL/L (ref 3.5–5.3)
POTASSIUM SERPL-SCNC: 3.5 MMOL/L (ref 3.5–5.3)
POTASSIUM SERPL-SCNC: 3.5 MMOL/L (ref 3.5–5.3)
POTASSIUM SERPL-SCNC: 3.6 MMOL/L (ref 3.5–5.3)
POTASSIUM SERPL-SCNC: 3.6 MMOL/L (ref 3.5–5.3)
POTASSIUM SERPL-SCNC: 3.7 MMOL/L (ref 3.5–5.3)
POTASSIUM SERPL-SCNC: 3.7 MMOL/L (ref 3.5–5.3)
POTASSIUM SERPL-SCNC: 4 MMOL/L (ref 3.5–5.3)
POTASSIUM UR-SCNC: 46.8 MMOL/L
PROT SERPL-MCNC: 5.1 G/DL (ref 6.4–8.2)
PROT SERPL-MCNC: 5.3 G/DL (ref 6.4–8.2)
PROT SERPL-MCNC: 5.6 G/DL (ref 6.4–8.2)
PROT SERPL-MCNC: 6.7 G/DL (ref 6.4–8.2)
PROT UR STRIP-MCNC: >=300 MG/DL
PROT UR STRIP-MCNC: ABNORMAL MG/DL
PROT UR STRIP-MCNC: NEGATIVE MG/DL
PROTHROMBIN TIME: 14.4 SECONDS (ref 11.6–14.5)
PROTHROMBIN TIME: 16 SECONDS (ref 11.6–14.5)
QRS AXIS: -58 DEGREES
QRS AXIS: -60 DEGREES
QRS AXIS: -65 DEGREES
QRS AXIS: -70 DEGREES
QRS AXIS: -78 DEGREES
QRS AXIS: -81 DEGREES
QRSD INTERVAL: 132 MS
QRSD INTERVAL: 140 MS
QRSD INTERVAL: 144 MS
QRSD INTERVAL: 144 MS
QRSD INTERVAL: 156 MS
QRSD INTERVAL: 176 MS
QT INTERVAL: 332 MS
QT INTERVAL: 336 MS
QT INTERVAL: 378 MS
QT INTERVAL: 400 MS
QT INTERVAL: 404 MS
QT INTERVAL: 470 MS
QTC INTERVAL: 456 MS
QTC INTERVAL: 458 MS
QTC INTERVAL: 461 MS
QTC INTERVAL: 495 MS
QTC INTERVAL: 495 MS
QTC INTERVAL: 512 MS
RBC # BLD AUTO: 2.34 MILLION/UL (ref 3.88–5.62)
RBC # BLD AUTO: 2.38 MILLION/UL (ref 3.88–5.62)
RBC # BLD AUTO: 2.39 MILLION/UL (ref 3.88–5.62)
RBC # BLD AUTO: 2.72 MILLION/UL (ref 3.88–5.62)
RBC # BLD AUTO: 2.78 MILLION/UL (ref 3.88–5.62)
RBC # BLD AUTO: 2.82 MILLION/UL (ref 3.88–5.62)
RBC # BLD AUTO: 2.82 MILLION/UL (ref 3.88–5.62)
RBC # BLD AUTO: 2.85 MILLION/UL (ref 3.88–5.62)
RBC # BLD AUTO: 2.86 MILLION/UL (ref 3.88–5.62)
RBC # BLD AUTO: 3.21 MILLION/UL (ref 3.88–5.62)
RBC # BLD AUTO: 3.6 MILLION/UL (ref 3.88–5.62)
RBC # BLD AUTO: 3.9 MILLION/UL (ref 3.88–5.62)
RBC # BLD AUTO: 4.02 MILLION/UL (ref 3.88–5.62)
RBC # BLD AUTO: 4.05 MILLION/UL (ref 3.88–5.62)
RBC #/AREA URNS AUTO: ABNORMAL /HPF
RBC MORPH BLD: NORMAL
RH BLD: POSITIVE
SALICYLATES SERPL-MCNC: <3 MG/DL (ref 3–20)
SALMONELLA DNA SPEC QL NAA+PROBE: NORMAL
SAO2 % BLD FROM PO2: 80 % (ref 60–85)
SAO2 % BLD FROM PO2: 89 % (ref 60–85)
SARS-COV-2 RNA RESP QL NAA+PROBE: NEGATIVE
SARS-COV-2 RNA RESP QL NAA+PROBE: NEGATIVE
SARS-COV-2 RNA SPEC QL NAA+PROBE: NOT DETECTED
SARS-COV-2 RNA SPEC QL NAA+PROBE: NOT DETECTED
SHIGA TOXIN STX GENE SPEC NAA+PROBE: NORMAL
SHIGELLA DNA SPEC QL NAA+PROBE: NORMAL
SL AMB  POCT GLUCOSE, UA: ABNORMAL
SL AMB LEUKOCYTE ESTERASE,UA: POSITIVE
SL AMB POCT BILIRUBIN,UA: ABNORMAL
SL AMB POCT BLOOD,UA: + 2
SL AMB POCT CLARITY,UA: CLEAR
SL AMB POCT COLOR,UA: YELLOW
SL AMB POCT KETONES,UA: ABNORMAL
SL AMB POCT NITRITE,UA: POSITIVE
SL AMB POCT PH,UA: 7
SL AMB POCT SPECIFIC GRAVITY,UA: 1.01
SL AMB POCT URINE PROTEIN: ABNORMAL
SL AMB POCT UROBILINOGEN: ABNORMAL
SODIUM BLD-SCNC: 137 MMOL/L (ref 136–145)
SODIUM BLD-SCNC: 138 MMOL/L (ref 136–145)
SODIUM SERPL-SCNC: 136 MMOL/L (ref 136–145)
SODIUM SERPL-SCNC: 137 MMOL/L (ref 136–145)
SODIUM SERPL-SCNC: 138 MMOL/L (ref 136–145)
SODIUM SERPL-SCNC: 139 MMOL/L (ref 136–145)
SODIUM SERPL-SCNC: 140 MMOL/L (ref 136–145)
SODIUM SERPL-SCNC: 141 MMOL/L (ref 136–145)
SODIUM SERPL-SCNC: 142 MMOL/L (ref 136–145)
SODIUM SERPL-SCNC: 142 MMOL/L (ref 136–145)
SODIUM SERPL-SCNC: 143 MMOL/L (ref 136–145)
SODIUM SERPL-SCNC: 143 MMOL/L (ref 136–145)
SODIUM SERPL-SCNC: 144 MMOL/L (ref 136–145)
SODIUM SERPL-SCNC: 147 MMOL/L (ref 136–145)
SODIUM SERPL-SCNC: 147 MMOL/L (ref 136–145)
SODIUM SERPL-SCNC: 148 MMOL/L (ref 136–145)
SP GR UR STRIP.AUTO: 1.01 (ref 1–1.03)
SP GR UR STRIP.AUTO: 1.01 (ref 1–1.03)
SP GR UR STRIP.AUTO: 1.02 (ref 1–1.03)
SPECIMEN EXPIRATION DATE: NORMAL
SPECIMEN EXPIRATION DATE: NORMAL
SPECIMEN SOURCE: ABNORMAL
SPECIMEN SOURCE: ABNORMAL
T WAVE AXIS: 32 DEGREES
T WAVE AXIS: 52 DEGREES
T WAVE AXIS: 62 DEGREES
T WAVE AXIS: 70 DEGREES
T WAVE AXIS: 79 DEGREES
T WAVE AXIS: 86 DEGREES
THC UR QL: NEGATIVE
TROPONIN I SERPL-MCNC: 0.08 NG/ML
TSH SERPL DL<=0.05 MIU/L-ACNC: 2.88 UIU/ML (ref 0.36–3.74)
UNIT DISPENSE STATUS: NORMAL
UNIT PRODUCT CODE: NORMAL
UNIT RH: NORMAL
UROBILINOGEN UR QL STRIP.AUTO: 0.2 E.U./DL
UROBILINOGEN UR QL STRIP.AUTO: 1 E.U./DL
VENTRICULAR RATE: 130 BPM
VENTRICULAR RATE: 143 BPM
VENTRICULAR RATE: 58 BPM
VENTRICULAR RATE: 78 BPM
VENTRICULAR RATE: 88 BPM
VENTRICULAR RATE: 90 BPM
WBC # BLD AUTO: 4.38 THOUSAND/UL (ref 4.31–10.16)
WBC # BLD AUTO: 4.64 THOUSAND/UL (ref 4.31–10.16)
WBC # BLD AUTO: 4.95 THOUSAND/UL (ref 4.31–10.16)
WBC # BLD AUTO: 5.17 THOUSAND/UL (ref 4.31–10.16)
WBC # BLD AUTO: 5.97 THOUSAND/UL (ref 4.31–10.16)
WBC # BLD AUTO: 6.14 THOUSAND/UL (ref 4.31–10.16)
WBC # BLD AUTO: 6.44 THOUSAND/UL (ref 4.31–10.16)
WBC # BLD AUTO: 6.81 THOUSAND/UL (ref 4.31–10.16)
WBC # BLD AUTO: 6.82 THOUSAND/UL (ref 4.31–10.16)
WBC # BLD AUTO: 7.38 THOUSAND/UL (ref 4.31–10.16)
WBC # BLD AUTO: 7.43 THOUSAND/UL (ref 4.31–10.16)
WBC # BLD AUTO: 8.3 THOUSAND/UL (ref 4.31–10.16)
WBC # BLD AUTO: 8.75 THOUSAND/UL (ref 4.31–10.16)
WBC # BLD AUTO: 9.96 THOUSAND/UL (ref 4.31–10.16)
WBC #/AREA URNS AUTO: ABNORMAL /HPF

## 2021-01-01 PROCEDURE — U0003 INFECTIOUS AGENT DETECTION BY NUCLEIC ACID (DNA OR RNA); SEVERE ACUTE RESPIRATORY SYNDROME CORONAVIRUS 2 (SARS-COV-2) (CORONAVIRUS DISEASE [COVID-19]), AMPLIFIED PROBE TECHNIQUE, MAKING USE OF HIGH THROUGHPUT TECHNOLOGIES AS DESCRIBED BY CMS-2020-01-R: HCPCS | Performed by: INTERNAL MEDICINE

## 2021-01-01 PROCEDURE — 80053 COMPREHEN METABOLIC PANEL: CPT | Performed by: EMERGENCY MEDICINE

## 2021-01-01 PROCEDURE — 87077 CULTURE AEROBIC IDENTIFY: CPT

## 2021-01-01 PROCEDURE — 99223 1ST HOSP IP/OBS HIGH 75: CPT | Performed by: PHYSICIAN ASSISTANT

## 2021-01-01 PROCEDURE — 99232 SBSQ HOSP IP/OBS MODERATE 35: CPT | Performed by: INTERNAL MEDICINE

## 2021-01-01 PROCEDURE — 82077 ASSAY SPEC XCP UR&BREATH IA: CPT | Performed by: EMERGENCY MEDICINE

## 2021-01-01 PROCEDURE — NC001 PR NO CHARGE: Performed by: SURGERY

## 2021-01-01 PROCEDURE — 99291 CRITICAL CARE FIRST HOUR: CPT | Performed by: EMERGENCY MEDICINE

## 2021-01-01 PROCEDURE — 93925 LOWER EXTREMITY STUDY: CPT | Performed by: SURGERY

## 2021-01-01 PROCEDURE — C1760 CLOSURE DEV, VASC: HCPCS

## 2021-01-01 PROCEDURE — 99220 PR INITIAL OBSERVATION CARE/DAY 70 MINUTES: CPT | Performed by: INTERNAL MEDICINE

## 2021-01-01 PROCEDURE — C1725 CATH, TRANSLUMIN NON-LASER: HCPCS

## 2021-01-01 PROCEDURE — 93010 ELECTROCARDIOGRAM REPORT: CPT | Performed by: PEDIATRICS

## 2021-01-01 PROCEDURE — 93010 ELECTROCARDIOGRAM REPORT: CPT | Performed by: INTERNAL MEDICINE

## 2021-01-01 PROCEDURE — 81002 URINALYSIS NONAUTO W/O SCOPE: CPT | Performed by: UROLOGY

## 2021-01-01 PROCEDURE — 99285 EMERGENCY DEPT VISIT HI MDM: CPT

## 2021-01-01 PROCEDURE — 84484 ASSAY OF TROPONIN QUANT: CPT | Performed by: EMERGENCY MEDICINE

## 2021-01-01 PROCEDURE — 83735 ASSAY OF MAGNESIUM: CPT | Performed by: INTERNAL MEDICINE

## 2021-01-01 PROCEDURE — 36415 COLL VENOUS BLD VENIPUNCTURE: CPT | Performed by: STUDENT IN AN ORGANIZED HEALTH CARE EDUCATION/TRAINING PROGRAM

## 2021-01-01 PROCEDURE — 99153 MOD SED SAME PHYS/QHP EA: CPT

## 2021-01-01 PROCEDURE — U0003 INFECTIOUS AGENT DETECTION BY NUCLEIC ACID (DNA OR RNA); SEVERE ACUTE RESPIRATORY SYNDROME CORONAVIRUS 2 (SARS-COV-2) (CORONAVIRUS DISEASE [COVID-19]), AMPLIFIED PROBE TECHNIQUE, MAKING USE OF HIGH THROUGHPUT TECHNOLOGIES AS DESCRIBED BY CMS-2020-01-R: HCPCS | Performed by: UROLOGY

## 2021-01-01 PROCEDURE — 80048 BASIC METABOLIC PNL TOTAL CA: CPT | Performed by: HOSPITALIST

## 2021-01-01 PROCEDURE — 80048 BASIC METABOLIC PNL TOTAL CA: CPT

## 2021-01-01 PROCEDURE — G1004 CDSM NDSC: HCPCS

## 2021-01-01 PROCEDURE — 85014 HEMATOCRIT: CPT | Performed by: HOSPITALIST

## 2021-01-01 PROCEDURE — 87505 NFCT AGENT DETECTION GI: CPT | Performed by: EMERGENCY MEDICINE

## 2021-01-01 PROCEDURE — 82330 ASSAY OF CALCIUM: CPT

## 2021-01-01 PROCEDURE — 90471 IMMUNIZATION ADMIN: CPT

## 2021-01-01 PROCEDURE — 93922 UPR/L XTREMITY ART 2 LEVELS: CPT | Performed by: SURGERY

## 2021-01-01 PROCEDURE — 80048 BASIC METABOLIC PNL TOTAL CA: CPT | Performed by: INTERNAL MEDICINE

## 2021-01-01 PROCEDURE — 93925 LOWER EXTREMITY STUDY: CPT

## 2021-01-01 PROCEDURE — 99223 1ST HOSP IP/OBS HIGH 75: CPT | Performed by: INTERNAL MEDICINE

## 2021-01-01 PROCEDURE — 88305 TISSUE EXAM BY PATHOLOGIST: CPT | Performed by: PATHOLOGY

## 2021-01-01 PROCEDURE — 83690 ASSAY OF LIPASE: CPT | Performed by: EMERGENCY MEDICINE

## 2021-01-01 PROCEDURE — 99152 MOD SED SAME PHYS/QHP 5/>YRS: CPT | Performed by: RADIOLOGY

## 2021-01-01 PROCEDURE — 99285 EMERGENCY DEPT VISIT HI MDM: CPT | Performed by: EMERGENCY MEDICINE

## 2021-01-01 PROCEDURE — 99239 HOSP IP/OBS DSCHRG MGMT >30: CPT | Performed by: INTERNAL MEDICINE

## 2021-01-01 PROCEDURE — 99232 SBSQ HOSP IP/OBS MODERATE 35: CPT | Performed by: HOSPITALIST

## 2021-01-01 PROCEDURE — 85025 COMPLETE CBC W/AUTO DIFF WBC: CPT | Performed by: INTERNAL MEDICINE

## 2021-01-01 PROCEDURE — U0005 INFEC AGEN DETEC AMPLI PROBE: HCPCS | Performed by: UROLOGY

## 2021-01-01 PROCEDURE — 99223 1ST HOSP IP/OBS HIGH 75: CPT | Performed by: HOSPITALIST

## 2021-01-01 PROCEDURE — 85018 HEMOGLOBIN: CPT | Performed by: UROLOGY

## 2021-01-01 PROCEDURE — 99221 1ST HOSP IP/OBS SF/LOW 40: CPT | Performed by: STUDENT IN AN ORGANIZED HEALTH CARE EDUCATION/TRAINING PROGRAM

## 2021-01-01 PROCEDURE — 93005 ELECTROCARDIOGRAM TRACING: CPT

## 2021-01-01 PROCEDURE — 85027 COMPLETE CBC AUTOMATED: CPT

## 2021-01-01 PROCEDURE — 0T5B8ZZ DESTRUCTION OF BLADDER, VIA NATURAL OR ARTIFICIAL OPENING ENDOSCOPIC: ICD-10-PCS | Performed by: UROLOGY

## 2021-01-01 PROCEDURE — 85610 PROTHROMBIN TIME: CPT | Performed by: EMERGENCY MEDICINE

## 2021-01-01 PROCEDURE — 97163 PT EVAL HIGH COMPLEX 45 MIN: CPT

## 2021-01-01 PROCEDURE — 84100 ASSAY OF PHOSPHORUS: CPT | Performed by: STUDENT IN AN ORGANIZED HEALTH CARE EDUCATION/TRAINING PROGRAM

## 2021-01-01 PROCEDURE — NC001 PR NO CHARGE: Performed by: HOSPITALIST

## 2021-01-01 PROCEDURE — 99215 OFFICE O/P EST HI 40 MIN: CPT | Performed by: SURGERY

## 2021-01-01 PROCEDURE — 85025 COMPLETE CBC W/AUTO DIFF WBC: CPT | Performed by: STUDENT IN AN ORGANIZED HEALTH CARE EDUCATION/TRAINING PROGRAM

## 2021-01-01 PROCEDURE — 83735 ASSAY OF MAGNESIUM: CPT | Performed by: STUDENT IN AN ORGANIZED HEALTH CARE EDUCATION/TRAINING PROGRAM

## 2021-01-01 PROCEDURE — 84132 ASSAY OF SERUM POTASSIUM: CPT | Performed by: INTERNAL MEDICINE

## 2021-01-01 PROCEDURE — 84100 ASSAY OF PHOSPHORUS: CPT | Performed by: EMERGENCY MEDICINE

## 2021-01-01 PROCEDURE — 97116 GAIT TRAINING THERAPY: CPT

## 2021-01-01 PROCEDURE — 80053 COMPREHEN METABOLIC PANEL: CPT | Performed by: STUDENT IN AN ORGANIZED HEALTH CARE EDUCATION/TRAINING PROGRAM

## 2021-01-01 PROCEDURE — 80048 BASIC METABOLIC PNL TOTAL CA: CPT | Performed by: PHYSICIAN ASSISTANT

## 2021-01-01 PROCEDURE — 83605 ASSAY OF LACTIC ACID: CPT | Performed by: INTERNAL MEDICINE

## 2021-01-01 PROCEDURE — 0V508ZZ DESTRUCTION OF PROSTATE, VIA NATURAL OR ARTIFICIAL OPENING ENDOSCOPIC: ICD-10-PCS | Performed by: UROLOGY

## 2021-01-01 PROCEDURE — 86920 COMPATIBILITY TEST SPIN: CPT

## 2021-01-01 PROCEDURE — 75630 X-RAY AORTA LEG ARTERIES: CPT

## 2021-01-01 PROCEDURE — 85025 COMPLETE CBC W/AUTO DIFF WBC: CPT | Performed by: EMERGENCY MEDICINE

## 2021-01-01 PROCEDURE — 80143 DRUG ASSAY ACETAMINOPHEN: CPT | Performed by: EMERGENCY MEDICINE

## 2021-01-01 PROCEDURE — 81001 URINALYSIS AUTO W/SCOPE: CPT

## 2021-01-01 PROCEDURE — 80179 DRUG ASSAY SALICYLATE: CPT | Performed by: EMERGENCY MEDICINE

## 2021-01-01 PROCEDURE — 85049 AUTOMATED PLATELET COUNT: CPT | Performed by: PHYSICIAN ASSISTANT

## 2021-01-01 PROCEDURE — 86901 BLOOD TYPING SEROLOGIC RH(D): CPT | Performed by: NURSE ANESTHETIST, CERTIFIED REGISTERED

## 2021-01-01 PROCEDURE — 99232 SBSQ HOSP IP/OBS MODERATE 35: CPT | Performed by: SURGERY

## 2021-01-01 PROCEDURE — 99213 OFFICE O/P EST LOW 20 MIN: CPT | Performed by: UROLOGY

## 2021-01-01 PROCEDURE — 36415 COLL VENOUS BLD VENIPUNCTURE: CPT | Performed by: PHYSICIAN ASSISTANT

## 2021-01-01 PROCEDURE — 84133 ASSAY OF URINE POTASSIUM: CPT | Performed by: INTERNAL MEDICINE

## 2021-01-01 PROCEDURE — 86850 RBC ANTIBODY SCREEN: CPT | Performed by: PHYSICIAN ASSISTANT

## 2021-01-01 PROCEDURE — 84295 ASSAY OF SERUM SODIUM: CPT

## 2021-01-01 PROCEDURE — 99152 MOD SED SAME PHYS/QHP 5/>YRS: CPT

## 2021-01-01 PROCEDURE — 84443 ASSAY THYROID STIM HORMONE: CPT | Performed by: EMERGENCY MEDICINE

## 2021-01-01 PROCEDURE — C1769 GUIDE WIRE: HCPCS

## 2021-01-01 PROCEDURE — 96360 HYDRATION IV INFUSION INIT: CPT

## 2021-01-01 PROCEDURE — 51798 US URINE CAPACITY MEASURE: CPT

## 2021-01-01 PROCEDURE — 75625 CONTRAST EXAM ABDOMINL AORTA: CPT | Performed by: RADIOLOGY

## 2021-01-01 PROCEDURE — 85007 BL SMEAR W/DIFF WBC COUNT: CPT | Performed by: EMERGENCY MEDICINE

## 2021-01-01 PROCEDURE — 85025 COMPLETE CBC W/AUTO DIFF WBC: CPT

## 2021-01-01 PROCEDURE — 87086 URINE CULTURE/COLONY COUNT: CPT | Performed by: UROLOGY

## 2021-01-01 PROCEDURE — 75710 ARTERY X-RAYS ARM/LEG: CPT | Performed by: RADIOLOGY

## 2021-01-01 PROCEDURE — 99204 OFFICE O/P NEW MOD 45 MIN: CPT | Performed by: UROLOGY

## 2021-01-01 PROCEDURE — 87086 URINE CULTURE/COLONY COUNT: CPT

## 2021-01-01 PROCEDURE — 92611 MOTION FLUOROSCOPY/SWALLOW: CPT

## 2021-01-01 PROCEDURE — 96361 HYDRATE IV INFUSION ADD-ON: CPT

## 2021-01-01 PROCEDURE — 83735 ASSAY OF MAGNESIUM: CPT | Performed by: EMERGENCY MEDICINE

## 2021-01-01 PROCEDURE — 85014 HEMATOCRIT: CPT | Performed by: UROLOGY

## 2021-01-01 PROCEDURE — 82570 ASSAY OF URINE CREATININE: CPT | Performed by: INTERNAL MEDICINE

## 2021-01-01 PROCEDURE — 93923 UPR/LXTR ART STDY 3+ LVLS: CPT

## 2021-01-01 PROCEDURE — 99215 OFFICE O/P EST HI 40 MIN: CPT | Performed by: PHYSICIAN ASSISTANT

## 2021-01-01 PROCEDURE — 74230 X-RAY XM SWLNG FUNCJ C+: CPT

## 2021-01-01 PROCEDURE — 76937 US GUIDE VASCULAR ACCESS: CPT | Performed by: RADIOLOGY

## 2021-01-01 PROCEDURE — 99214 OFFICE O/P EST MOD 30 MIN: CPT | Performed by: NURSE PRACTITIONER

## 2021-01-01 PROCEDURE — 82436 ASSAY OF URINE CHLORIDE: CPT | Performed by: INTERNAL MEDICINE

## 2021-01-01 PROCEDURE — 36415 COLL VENOUS BLD VENIPUNCTURE: CPT | Performed by: EMERGENCY MEDICINE

## 2021-01-01 PROCEDURE — 87040 BLOOD CULTURE FOR BACTERIA: CPT | Performed by: EMERGENCY MEDICINE

## 2021-01-01 PROCEDURE — P9016 RBC LEUKOCYTES REDUCED: HCPCS

## 2021-01-01 PROCEDURE — 99291 CRITICAL CARE FIRST HOUR: CPT

## 2021-01-01 PROCEDURE — 85027 COMPLETE CBC AUTOMATED: CPT | Performed by: PHYSICIAN ASSISTANT

## 2021-01-01 PROCEDURE — 99232 SBSQ HOSP IP/OBS MODERATE 35: CPT | Performed by: UROLOGY

## 2021-01-01 PROCEDURE — 85027 COMPLETE CBC AUTOMATED: CPT | Performed by: UROLOGY

## 2021-01-01 PROCEDURE — 82805 BLOOD GASES W/O2 SATURATION: CPT | Performed by: STUDENT IN AN ORGANIZED HEALTH CARE EDUCATION/TRAINING PROGRAM

## 2021-01-01 PROCEDURE — 99239 HOSP IP/OBS DSCHRG MGMT >30: CPT | Performed by: PHYSICIAN ASSISTANT

## 2021-01-01 PROCEDURE — 87186 SC STD MICRODIL/AGAR DIL: CPT

## 2021-01-01 PROCEDURE — 84100 ASSAY OF PHOSPHORUS: CPT | Performed by: INTERNAL MEDICINE

## 2021-01-01 PROCEDURE — 85018 HEMOGLOBIN: CPT | Performed by: HOSPITALIST

## 2021-01-01 PROCEDURE — 85730 THROMBOPLASTIN TIME PARTIAL: CPT | Performed by: EMERGENCY MEDICINE

## 2021-01-01 PROCEDURE — 85025 COMPLETE CBC W/AUTO DIFF WBC: CPT | Performed by: HOSPITALIST

## 2021-01-01 PROCEDURE — 97535 SELF CARE MNGMENT TRAINING: CPT

## 2021-01-01 PROCEDURE — C2623 CATH, TRANSLUMIN, DRUG-COAT: HCPCS

## 2021-01-01 PROCEDURE — 84132 ASSAY OF SERUM POTASSIUM: CPT

## 2021-01-01 PROCEDURE — 0TCB8ZZ EXTIRPATION OF MATTER FROM BLADDER, VIA NATURAL OR ARTIFICIAL OPENING ENDOSCOPIC: ICD-10-PCS | Performed by: UROLOGY

## 2021-01-01 PROCEDURE — 74220 X-RAY XM ESOPHAGUS 1CNTRST: CPT

## 2021-01-01 PROCEDURE — 37224 HB FEM/POPL REVAS W/TLA: CPT

## 2021-01-01 PROCEDURE — 99222 1ST HOSP IP/OBS MODERATE 55: CPT | Performed by: PSYCHIATRY & NEUROLOGY

## 2021-01-01 PROCEDURE — 1124F ACP DISCUSS-NO DSCNMKR DOCD: CPT | Performed by: NURSE PRACTITIONER

## 2021-01-01 PROCEDURE — 86850 RBC ANTIBODY SCREEN: CPT | Performed by: NURSE ANESTHETIST, CERTIFIED REGISTERED

## 2021-01-01 PROCEDURE — 99284 EMERGENCY DEPT VISIT MOD MDM: CPT

## 2021-01-01 PROCEDURE — 99222 1ST HOSP IP/OBS MODERATE 55: CPT | Performed by: UROLOGY

## 2021-01-01 PROCEDURE — 73630 X-RAY EXAM OF FOOT: CPT

## 2021-01-01 PROCEDURE — 82803 BLOOD GASES ANY COMBINATION: CPT

## 2021-01-01 PROCEDURE — 70498 CT ANGIOGRAPHY NECK: CPT

## 2021-01-01 PROCEDURE — 80307 DRUG TEST PRSMV CHEM ANLYZR: CPT | Performed by: EMERGENCY MEDICINE

## 2021-01-01 PROCEDURE — 99204 OFFICE O/P NEW MOD 45 MIN: CPT | Performed by: PSYCHIATRY & NEUROLOGY

## 2021-01-01 PROCEDURE — 85014 HEMATOCRIT: CPT | Performed by: INTERNAL MEDICINE

## 2021-01-01 PROCEDURE — 80048 BASIC METABOLIC PNL TOTAL CA: CPT | Performed by: UROLOGY

## 2021-01-01 PROCEDURE — C1769 GUIDE WIRE: HCPCS | Performed by: UROLOGY

## 2021-01-01 PROCEDURE — 36415 COLL VENOUS BLD VENIPUNCTURE: CPT | Performed by: INTERNAL MEDICINE

## 2021-01-01 PROCEDURE — 93978 VASCULAR STUDY: CPT

## 2021-01-01 PROCEDURE — 96366 THER/PROPH/DIAG IV INF ADDON: CPT

## 2021-01-01 PROCEDURE — 85027 COMPLETE CBC AUTOMATED: CPT | Performed by: EMERGENCY MEDICINE

## 2021-01-01 PROCEDURE — 85018 HEMOGLOBIN: CPT | Performed by: INTERNAL MEDICINE

## 2021-01-01 PROCEDURE — U0005 INFEC AGEN DETEC AMPLI PROBE: HCPCS | Performed by: INTERNAL MEDICINE

## 2021-01-01 PROCEDURE — 85049 AUTOMATED PLATELET COUNT: CPT | Performed by: UROLOGY

## 2021-01-01 PROCEDURE — C1894 INTRO/SHEATH, NON-LASER: HCPCS

## 2021-01-01 PROCEDURE — 36415 COLL VENOUS BLD VENIPUNCTURE: CPT

## 2021-01-01 PROCEDURE — 82088 ASSAY OF ALDOSTERONE: CPT | Performed by: INTERNAL MEDICINE

## 2021-01-01 PROCEDURE — 83605 ASSAY OF LACTIC ACID: CPT | Performed by: EMERGENCY MEDICINE

## 2021-01-01 PROCEDURE — 37224 PR REVSC OPN/PRG FEM/POP W/ANGIOPLASTY UNI: CPT | Performed by: RADIOLOGY

## 2021-01-01 PROCEDURE — 85027 COMPLETE CBC AUTOMATED: CPT | Performed by: INTERNAL MEDICINE

## 2021-01-01 PROCEDURE — 71045 X-RAY EXAM CHEST 1 VIEW: CPT

## 2021-01-01 PROCEDURE — 97530 THERAPEUTIC ACTIVITIES: CPT

## 2021-01-01 PROCEDURE — G0379 DIRECT REFER HOSPITAL OBSERV: HCPCS

## 2021-01-01 PROCEDURE — 86900 BLOOD TYPING SEROLOGIC ABO: CPT | Performed by: PHYSICIAN ASSISTANT

## 2021-01-01 PROCEDURE — 86900 BLOOD TYPING SEROLOGIC ABO: CPT | Performed by: NURSE ANESTHETIST, CERTIFIED REGISTERED

## 2021-01-01 PROCEDURE — 87493 C DIFF AMPLIFIED PROBE: CPT | Performed by: EMERGENCY MEDICINE

## 2021-01-01 PROCEDURE — 81001 URINALYSIS AUTO W/SCOPE: CPT | Performed by: INTERNAL MEDICINE

## 2021-01-01 PROCEDURE — 97167 OT EVAL HIGH COMPLEX 60 MIN: CPT

## 2021-01-01 PROCEDURE — 86901 BLOOD TYPING SEROLOGIC RH(D): CPT | Performed by: PHYSICIAN ASSISTANT

## 2021-01-01 PROCEDURE — 74178 CT ABD&PLV WO CNTR FLWD CNTR: CPT

## 2021-01-01 PROCEDURE — 85014 HEMATOCRIT: CPT

## 2021-01-01 PROCEDURE — 52224 CYSTOSCOPY AND TREATMENT: CPT | Performed by: UROLOGY

## 2021-01-01 PROCEDURE — 99220 PR INITIAL OBSERVATION CARE/DAY 70 MINUTES: CPT | Performed by: HOSPITALIST

## 2021-01-01 PROCEDURE — 96365 THER/PROPH/DIAG IV INF INIT: CPT

## 2021-01-01 PROCEDURE — 99284 EMERGENCY DEPT VISIT MOD MDM: CPT | Performed by: EMERGENCY MEDICINE

## 2021-01-01 PROCEDURE — 90715 TDAP VACCINE 7 YRS/> IM: CPT | Performed by: EMERGENCY MEDICINE

## 2021-01-01 PROCEDURE — 93978 VASCULAR STUDY: CPT | Performed by: SURGERY

## 2021-01-01 PROCEDURE — C1766 INTRO/SHEATH,STRBLE,NON-PEEL: HCPCS

## 2021-01-01 PROCEDURE — 83735 ASSAY OF MAGNESIUM: CPT | Performed by: PHYSICIAN ASSISTANT

## 2021-01-01 PROCEDURE — 81001 URINALYSIS AUTO W/SCOPE: CPT | Performed by: EMERGENCY MEDICINE

## 2021-01-01 PROCEDURE — 73110 X-RAY EXAM OF WRIST: CPT

## 2021-01-01 PROCEDURE — 12004 RPR S/N/AX/GEN/TRK7.6-12.5CM: CPT | Performed by: SURGERY

## 2021-01-01 PROCEDURE — 82947 ASSAY GLUCOSE BLOOD QUANT: CPT

## 2021-01-01 RX ORDER — POTASSIUM CHLORIDE 20 MEQ/1
40 TABLET, EXTENDED RELEASE ORAL ONCE
Status: COMPLETED | OUTPATIENT
Start: 2021-01-01 | End: 2021-01-01

## 2021-01-01 RX ORDER — LORAZEPAM 0.5 MG/1
1 TABLET ORAL ONCE
Status: DISCONTINUED | OUTPATIENT
Start: 2021-01-01 | End: 2021-01-01

## 2021-01-01 RX ORDER — FENTANYL CITRATE/PF 50 MCG/ML
25 SYRINGE (ML) INJECTION
Status: DISCONTINUED | OUTPATIENT
Start: 2021-01-01 | End: 2021-01-01 | Stop reason: HOSPADM

## 2021-01-01 RX ORDER — SODIUM CHLORIDE, SODIUM LACTATE, POTASSIUM CHLORIDE, CALCIUM CHLORIDE 600; 310; 30; 20 MG/100ML; MG/100ML; MG/100ML; MG/100ML
100 INJECTION, SOLUTION INTRAVENOUS CONTINUOUS
Status: DISCONTINUED | OUTPATIENT
Start: 2021-01-01 | End: 2021-01-01 | Stop reason: HOSPADM

## 2021-01-01 RX ORDER — POTASSIUM CHLORIDE 20 MEQ/1
40 TABLET, EXTENDED RELEASE ORAL ONCE
Status: DISCONTINUED | OUTPATIENT
Start: 2021-01-01 | End: 2021-01-01

## 2021-01-01 RX ORDER — POTASSIUM CHLORIDE 29.8 MG/ML
40 INJECTION INTRAVENOUS ONCE
Status: DISCONTINUED | OUTPATIENT
Start: 2021-01-01 | End: 2021-01-01

## 2021-01-01 RX ORDER — CEPHALEXIN 500 MG/1
500 CAPSULE ORAL EVERY 6 HOURS SCHEDULED
Status: DISCONTINUED | OUTPATIENT
Start: 2021-01-01 | End: 2021-01-01

## 2021-01-01 RX ORDER — POTASSIUM CHLORIDE 20 MEQ/1
40 TABLET, EXTENDED RELEASE ORAL 2 TIMES DAILY
Status: DISCONTINUED | OUTPATIENT
Start: 2021-01-01 | End: 2021-01-01

## 2021-01-01 RX ORDER — POTASSIUM CHLORIDE 14.9 MG/ML
20 INJECTION INTRAVENOUS
Status: COMPLETED | OUTPATIENT
Start: 2021-01-01 | End: 2021-01-01

## 2021-01-01 RX ORDER — LIDOCAINE HYDROCHLORIDE 20 MG/ML
1 JELLY TOPICAL ONCE
Status: COMPLETED | OUTPATIENT
Start: 2021-01-01 | End: 2021-01-01

## 2021-01-01 RX ORDER — SODIUM CHLORIDE, SODIUM LACTATE, POTASSIUM CHLORIDE, CALCIUM CHLORIDE 600; 310; 30; 20 MG/100ML; MG/100ML; MG/100ML; MG/100ML
125 INJECTION, SOLUTION INTRAVENOUS CONTINUOUS
Status: DISCONTINUED | OUTPATIENT
Start: 2021-01-01 | End: 2021-01-01 | Stop reason: HOSPADM

## 2021-01-01 RX ORDER — LEVETIRACETAM 500 MG/1
500 TABLET ORAL EVERY 12 HOURS SCHEDULED
Status: DISCONTINUED | OUTPATIENT
Start: 2021-01-01 | End: 2021-01-01 | Stop reason: HOSPADM

## 2021-01-01 RX ORDER — POLYETHYLENE GLYCOL 3350 17 G/17G
17 POWDER, FOR SOLUTION ORAL DAILY PRN
Status: DISCONTINUED | OUTPATIENT
Start: 2021-01-01 | End: 2021-01-01 | Stop reason: HOSPADM

## 2021-01-01 RX ORDER — OXYCODONE HYDROCHLORIDE 5 MG/1
2.5 TABLET ORAL EVERY 4 HOURS PRN
Status: DISCONTINUED | OUTPATIENT
Start: 2021-01-01 | End: 2021-01-01 | Stop reason: HOSPADM

## 2021-01-01 RX ORDER — ACETAMINOPHEN 325 MG/1
650 TABLET ORAL EVERY 6 HOURS PRN
Status: DISCONTINUED | OUTPATIENT
Start: 2021-01-01 | End: 2021-01-01 | Stop reason: HOSPADM

## 2021-01-01 RX ORDER — HYDROCHLOROTHIAZIDE 25 MG/1
25 TABLET ORAL DAILY
Status: DISCONTINUED | OUTPATIENT
Start: 2021-01-01 | End: 2021-01-01 | Stop reason: HOSPADM

## 2021-01-01 RX ORDER — DEXTROSE AND SODIUM CHLORIDE 5; .45 G/100ML; G/100ML
75 INJECTION, SOLUTION INTRAVENOUS CONTINUOUS
Status: DISCONTINUED | OUTPATIENT
Start: 2021-01-01 | End: 2021-01-01

## 2021-01-01 RX ORDER — FENTANYL CITRATE 50 UG/ML
INJECTION, SOLUTION INTRAMUSCULAR; INTRAVENOUS AS NEEDED
Status: DISCONTINUED | OUTPATIENT
Start: 2021-01-01 | End: 2021-01-01

## 2021-01-01 RX ORDER — SODIUM CHLORIDE, SODIUM LACTATE, POTASSIUM CHLORIDE, CALCIUM CHLORIDE 600; 310; 30; 20 MG/100ML; MG/100ML; MG/100ML; MG/100ML
75 INJECTION, SOLUTION INTRAVENOUS CONTINUOUS
Status: DISCONTINUED | OUTPATIENT
Start: 2021-01-01 | End: 2021-01-01

## 2021-01-01 RX ORDER — MIDAZOLAM HYDROCHLORIDE 2 MG/2ML
INJECTION, SOLUTION INTRAMUSCULAR; INTRAVENOUS CODE/TRAUMA/SEDATION MEDICATION
Status: COMPLETED | OUTPATIENT
Start: 2021-01-01 | End: 2021-01-01

## 2021-01-01 RX ORDER — PROPOFOL 10 MG/ML
INJECTION, EMULSION INTRAVENOUS AS NEEDED
Status: DISCONTINUED | OUTPATIENT
Start: 2021-01-01 | End: 2021-01-01

## 2021-01-01 RX ORDER — POTASSIUM CHLORIDE 20 MEQ/1
40 TABLET, EXTENDED RELEASE ORAL EVERY 6 HOURS
Status: DISCONTINUED | OUTPATIENT
Start: 2021-01-01 | End: 2021-01-01

## 2021-01-01 RX ORDER — HYDROCORTISONE 10 MG/1
10 TABLET ORAL 2 TIMES DAILY
Status: DISCONTINUED | OUTPATIENT
Start: 2021-01-01 | End: 2021-01-01 | Stop reason: HOSPADM

## 2021-01-01 RX ORDER — SODIUM CHLORIDE, SODIUM LACTATE, POTASSIUM CHLORIDE, CALCIUM CHLORIDE 600; 310; 30; 20 MG/100ML; MG/100ML; MG/100ML; MG/100ML
INJECTION, SOLUTION INTRAVENOUS CONTINUOUS PRN
Status: DISCONTINUED | OUTPATIENT
Start: 2021-01-01 | End: 2021-01-01

## 2021-01-01 RX ORDER — SACCHAROMYCES BOULARDII 250 MG
250 CAPSULE ORAL 2 TIMES DAILY
Status: DISCONTINUED | OUTPATIENT
Start: 2021-01-01 | End: 2021-01-01 | Stop reason: HOSPADM

## 2021-01-01 RX ORDER — POTASSIUM CHLORIDE 20 MEQ/1
40 TABLET, EXTENDED RELEASE ORAL
Status: DISPENSED | OUTPATIENT
Start: 2021-01-01 | End: 2021-01-01

## 2021-01-01 RX ORDER — CEFIXIME 400 MG/1
400 CAPSULE ORAL DAILY
Qty: 5 CAPSULE | Refills: 0 | Status: SHIPPED | OUTPATIENT
Start: 2021-01-01 | End: 2021-01-01

## 2021-01-01 RX ORDER — ATORVASTATIN CALCIUM 40 MG/1
40 TABLET, FILM COATED ORAL
Status: DISCONTINUED | OUTPATIENT
Start: 2021-01-01 | End: 2021-01-01 | Stop reason: HOSPADM

## 2021-01-01 RX ORDER — METOPROLOL TARTRATE 5 MG/5ML
2.5 INJECTION INTRAVENOUS EVERY 6 HOURS PRN
Status: DISCONTINUED | OUTPATIENT
Start: 2021-01-01 | End: 2021-01-01 | Stop reason: HOSPADM

## 2021-01-01 RX ORDER — ATORVASTATIN CALCIUM 40 MG/1
40 TABLET, FILM COATED ORAL
Status: CANCELLED | OUTPATIENT
Start: 2021-01-01

## 2021-01-01 RX ORDER — ACETAMINOPHEN 325 MG/1
650 TABLET ORAL EVERY 6 HOURS PRN
Status: CANCELLED | OUTPATIENT
Start: 2021-01-01

## 2021-01-01 RX ORDER — CALCIUM CARBONATE 200(500)MG
500 TABLET,CHEWABLE ORAL DAILY PRN
Qty: 30 TABLET | Refills: 0 | Status: SHIPPED | OUTPATIENT
Start: 2021-01-01 | End: 2021-10-20

## 2021-01-01 RX ORDER — HYDROCORTISONE 10 MG/1
10 TABLET ORAL 3 TIMES DAILY
Status: DISCONTINUED | OUTPATIENT
Start: 2021-01-01 | End: 2021-01-01

## 2021-01-01 RX ORDER — LEVETIRACETAM 500 MG/1
250 TABLET ORAL DAILY
Status: DISCONTINUED | OUTPATIENT
Start: 2021-01-01 | End: 2021-01-01 | Stop reason: HOSPADM

## 2021-01-01 RX ORDER — FENTANYL CITRATE 50 UG/ML
INJECTION, SOLUTION INTRAMUSCULAR; INTRAVENOUS CODE/TRAUMA/SEDATION MEDICATION
Status: COMPLETED | OUTPATIENT
Start: 2021-01-01 | End: 2021-01-01

## 2021-01-01 RX ORDER — LOPERAMIDE HYDROCHLORIDE 2 MG/1
2 CAPSULE ORAL EVERY 4 HOURS PRN
Status: DISCONTINUED | OUTPATIENT
Start: 2021-01-01 | End: 2021-01-01 | Stop reason: HOSPADM

## 2021-01-01 RX ORDER — LOPERAMIDE HYDROCHLORIDE 2 MG/1
2 CAPSULE ORAL EVERY 4 HOURS PRN
Qty: 30 CAPSULE | Refills: 0 | Status: SHIPPED | OUTPATIENT
Start: 2021-01-01

## 2021-01-01 RX ORDER — POTASSIUM CHLORIDE 29.8 MG/ML
40 INJECTION INTRAVENOUS ONCE
Status: COMPLETED | OUTPATIENT
Start: 2021-01-01 | End: 2021-01-01

## 2021-01-01 RX ORDER — HEPARIN SODIUM 5000 [USP'U]/ML
5000 INJECTION, SOLUTION INTRAVENOUS; SUBCUTANEOUS EVERY 8 HOURS SCHEDULED
Status: DISCONTINUED | OUTPATIENT
Start: 2021-01-01 | End: 2021-01-01 | Stop reason: HOSPADM

## 2021-01-01 RX ORDER — ONDANSETRON 2 MG/ML
4 INJECTION INTRAMUSCULAR; INTRAVENOUS EVERY 4 HOURS PRN
Status: DISCONTINUED | OUTPATIENT
Start: 2021-01-01 | End: 2021-01-01 | Stop reason: HOSPADM

## 2021-01-01 RX ORDER — ATORVASTATIN CALCIUM 20 MG/1
20 TABLET, FILM COATED ORAL DAILY
Qty: 90 TABLET | Refills: 4 | Status: SHIPPED | OUTPATIENT
Start: 2021-01-01 | End: 2021-01-01 | Stop reason: HOSPADM

## 2021-01-01 RX ORDER — FINASTERIDE 5 MG/1
5 TABLET, FILM COATED ORAL DAILY
Status: DISCONTINUED | OUTPATIENT
Start: 2021-01-01 | End: 2021-01-01 | Stop reason: HOSPADM

## 2021-01-01 RX ORDER — ONDANSETRON 2 MG/ML
4 INJECTION INTRAMUSCULAR; INTRAVENOUS ONCE AS NEEDED
Status: DISCONTINUED | OUTPATIENT
Start: 2021-01-01 | End: 2021-01-01 | Stop reason: HOSPADM

## 2021-01-01 RX ORDER — ASPIRIN 81 MG/1
81 TABLET ORAL DAILY
Status: DISCONTINUED | OUTPATIENT
Start: 2021-01-01 | End: 2021-01-01 | Stop reason: HOSPADM

## 2021-01-01 RX ORDER — POTASSIUM CHLORIDE 29.8 MG/ML
40 INJECTION INTRAVENOUS
Status: DISPENSED | OUTPATIENT
Start: 2021-01-01 | End: 2021-01-01

## 2021-01-01 RX ORDER — CEPHALEXIN 500 MG/1
500 CAPSULE ORAL EVERY 6 HOURS SCHEDULED
Qty: 20 CAPSULE | Refills: 0 | Status: SHIPPED | OUTPATIENT
Start: 2021-01-01 | End: 2021-01-01

## 2021-01-01 RX ORDER — LIDOCAINE HYDROCHLORIDE 10 MG/ML
INJECTION, SOLUTION EPIDURAL; INFILTRATION; INTRACAUDAL; PERINEURAL AS NEEDED
Status: DISCONTINUED | OUTPATIENT
Start: 2021-01-01 | End: 2021-01-01

## 2021-01-01 RX ORDER — FAMOTIDINE 20 MG/1
20 TABLET, FILM COATED ORAL
Qty: 30 TABLET | Refills: 0 | Status: SHIPPED | OUTPATIENT
Start: 2021-01-01 | End: 2021-11-05

## 2021-01-01 RX ORDER — LANOLIN ALCOHOL/MO/W.PET/CERES
6 CREAM (GRAM) TOPICAL
Status: DISCONTINUED | OUTPATIENT
Start: 2021-01-01 | End: 2021-01-01 | Stop reason: HOSPADM

## 2021-01-01 RX ORDER — DEXAMETHASONE SODIUM PHOSPHATE 10 MG/ML
INJECTION, SOLUTION INTRAMUSCULAR; INTRAVENOUS AS NEEDED
Status: DISCONTINUED | OUTPATIENT
Start: 2021-01-01 | End: 2021-01-01

## 2021-01-01 RX ORDER — CEFAZOLIN SODIUM 2 G/50ML
2000 SOLUTION INTRAVENOUS ONCE
Status: COMPLETED | OUTPATIENT
Start: 2021-01-01 | End: 2021-01-01

## 2021-01-01 RX ORDER — SIMETHICONE 80 MG
80 TABLET,CHEWABLE ORAL EVERY 6 HOURS PRN
Status: DISCONTINUED | OUTPATIENT
Start: 2021-01-01 | End: 2021-01-01 | Stop reason: HOSPADM

## 2021-01-01 RX ORDER — LANOLIN ALCOHOL/MO/W.PET/CERES
3 CREAM (GRAM) TOPICAL
Status: DISCONTINUED | OUTPATIENT
Start: 2021-01-01 | End: 2021-01-01 | Stop reason: HOSPADM

## 2021-01-01 RX ORDER — METOPROLOL TARTRATE 5 MG/5ML
5 INJECTION INTRAVENOUS EVERY 6 HOURS PRN
Status: DISCONTINUED | OUTPATIENT
Start: 2021-01-01 | End: 2021-01-01 | Stop reason: HOSPADM

## 2021-01-01 RX ORDER — ONDANSETRON 2 MG/ML
INJECTION INTRAMUSCULAR; INTRAVENOUS AS NEEDED
Status: DISCONTINUED | OUTPATIENT
Start: 2021-01-01 | End: 2021-01-01

## 2021-01-01 RX ORDER — POTASSIUM CHLORIDE 20 MEQ/1
40 TABLET, EXTENDED RELEASE ORAL 2 TIMES DAILY
Status: COMPLETED | OUTPATIENT
Start: 2021-01-01 | End: 2021-01-01

## 2021-01-01 RX ORDER — DEXAMETHASONE SODIUM PHOSPHATE 4 MG/ML
INJECTION, SOLUTION INTRA-ARTICULAR; INTRALESIONAL; INTRAMUSCULAR; INTRAVENOUS; SOFT TISSUE AS NEEDED
Status: DISCONTINUED | OUTPATIENT
Start: 2021-01-01 | End: 2021-01-01

## 2021-01-01 RX ORDER — ONDANSETRON 2 MG/ML
4 INJECTION INTRAMUSCULAR; INTRAVENOUS EVERY 6 HOURS PRN
Status: DISCONTINUED | OUTPATIENT
Start: 2021-01-01 | End: 2021-01-01 | Stop reason: HOSPADM

## 2021-01-01 RX ORDER — POTASSIUM CHLORIDE 14.9 MG/ML
20 INJECTION INTRAVENOUS ONCE
Status: COMPLETED | OUTPATIENT
Start: 2021-01-01 | End: 2021-01-01

## 2021-01-01 RX ORDER — CIPROFLOXACIN 2 MG/ML
400 INJECTION, SOLUTION INTRAVENOUS EVERY 12 HOURS
Status: DISCONTINUED | OUTPATIENT
Start: 2021-01-01 | End: 2021-01-01 | Stop reason: HOSPADM

## 2021-01-01 RX ORDER — ACETAMINOPHEN 325 MG/1
650 TABLET ORAL EVERY 6 HOURS SCHEDULED
Status: DISCONTINUED | OUTPATIENT
Start: 2021-01-01 | End: 2021-01-01 | Stop reason: HOSPADM

## 2021-01-01 RX ORDER — FINASTERIDE 5 MG/1
5 TABLET, FILM COATED ORAL DAILY
COMMUNITY

## 2021-01-01 RX ORDER — SODIUM CHLORIDE, SODIUM LACTATE, POTASSIUM CHLORIDE, CALCIUM CHLORIDE 600; 310; 30; 20 MG/100ML; MG/100ML; MG/100ML; MG/100ML
50 INJECTION, SOLUTION INTRAVENOUS CONTINUOUS
Status: DISCONTINUED | OUTPATIENT
Start: 2021-01-01 | End: 2021-01-01

## 2021-01-01 RX ORDER — ONDANSETRON 2 MG/ML
4 INJECTION INTRAMUSCULAR; INTRAVENOUS EVERY 6 HOURS PRN
Status: DISCONTINUED | OUTPATIENT
Start: 2021-01-01 | End: 2021-01-01

## 2021-01-01 RX ORDER — SACCHAROMYCES BOULARDII 250 MG
250 CAPSULE ORAL 2 TIMES DAILY
Qty: 60 CAPSULE | Refills: 0 | Status: SHIPPED | OUTPATIENT
Start: 2021-01-01 | End: 2021-11-05

## 2021-01-01 RX ORDER — FAMOTIDINE 20 MG/1
20 TABLET, FILM COATED ORAL
Status: DISCONTINUED | OUTPATIENT
Start: 2021-01-01 | End: 2021-01-01 | Stop reason: HOSPADM

## 2021-01-01 RX ORDER — GLYCINE 1.5 G/100ML
SOLUTION IRRIGATION AS NEEDED
Status: DISCONTINUED | OUTPATIENT
Start: 2021-01-01 | End: 2021-01-01 | Stop reason: HOSPADM

## 2021-01-01 RX ORDER — SODIUM CHLORIDE 9 MG/ML
100 INJECTION, SOLUTION INTRAVENOUS CONTINUOUS
Status: DISPENSED | OUTPATIENT
Start: 2021-01-01 | End: 2021-01-01

## 2021-01-01 RX ORDER — SODIUM CHLORIDE 9 MG/ML
75 INJECTION, SOLUTION INTRAVENOUS CONTINUOUS
Status: DISCONTINUED | OUTPATIENT
Start: 2021-01-01 | End: 2021-01-01

## 2021-01-01 RX ORDER — MAGNESIUM HYDROXIDE 1200 MG/15ML
LIQUID ORAL AS NEEDED
Status: DISCONTINUED | OUTPATIENT
Start: 2021-01-01 | End: 2021-01-01 | Stop reason: HOSPADM

## 2021-01-01 RX ORDER — ROSUVASTATIN CALCIUM 20 MG/1
20 TABLET, COATED ORAL DAILY
Qty: 90 EACH | Refills: 4 | Status: SHIPPED | OUTPATIENT
Start: 2021-01-01 | End: 2021-01-01 | Stop reason: SINTOL

## 2021-01-01 RX ORDER — SIMETHICONE 80 MG
80 TABLET,CHEWABLE ORAL EVERY 6 HOURS PRN
Qty: 30 TABLET | Refills: 0 | Status: SHIPPED | OUTPATIENT
Start: 2021-01-01

## 2021-01-01 RX ORDER — ROSUVASTATIN CALCIUM 20 MG/1
20 TABLET, COATED ORAL DAILY
Qty: 90 EACH | Refills: 4 | Status: SHIPPED | OUTPATIENT
Start: 2021-01-01 | End: 2021-01-01 | Stop reason: SDUPTHER

## 2021-01-01 RX ORDER — METOPROLOL SUCCINATE 25 MG/1
25 TABLET, EXTENDED RELEASE ORAL DAILY
COMMUNITY
Start: 2021-01-01 | End: 2021-01-01 | Stop reason: HOSPADM

## 2021-01-01 RX ORDER — SODIUM CHLORIDE 9 MG/ML
75 INJECTION, SOLUTION INTRAVENOUS CONTINUOUS
Status: DISPENSED | OUTPATIENT
Start: 2021-01-01 | End: 2021-01-01

## 2021-01-01 RX ORDER — OXYCODONE HYDROCHLORIDE 5 MG/1
5 TABLET ORAL EVERY 4 HOURS PRN
Status: DISCONTINUED | OUTPATIENT
Start: 2021-01-01 | End: 2021-01-01 | Stop reason: HOSPADM

## 2021-01-01 RX ORDER — LIDOCAINE WITH 8.4% SOD BICARB 0.9%(10ML)
SYRINGE (ML) INJECTION CODE/TRAUMA/SEDATION MEDICATION
Status: COMPLETED | OUTPATIENT
Start: 2021-01-01 | End: 2021-01-01

## 2021-01-01 RX ORDER — VANCOMYCIN HYDROCHLORIDE 1 G/200ML
15 INJECTION, SOLUTION INTRAVENOUS EVERY 12 HOURS
Status: DISCONTINUED | OUTPATIENT
Start: 2021-01-01 | End: 2021-01-01

## 2021-01-01 RX ORDER — MAGNESIUM HYDROXIDE 1200 MG/15ML
3000 LIQUID ORAL CONTINUOUS
Status: DISCONTINUED | OUTPATIENT
Start: 2021-01-01 | End: 2021-01-01 | Stop reason: HOSPADM

## 2021-01-01 RX ORDER — POTASSIUM CHLORIDE 20MEQ/15ML
60 LIQUID (ML) ORAL ONCE
Status: COMPLETED | OUTPATIENT
Start: 2021-01-01 | End: 2021-01-01

## 2021-01-01 RX ORDER — HYDROCORTISONE 10 MG/1
100 TABLET ORAL 3 TIMES DAILY
Status: ON HOLD | COMMUNITY
End: 2021-01-01 | Stop reason: SDUPTHER

## 2021-01-01 RX ORDER — DOCUSATE SODIUM 100 MG/1
100 CAPSULE, LIQUID FILLED ORAL 2 TIMES DAILY
Status: DISCONTINUED | OUTPATIENT
Start: 2021-01-01 | End: 2021-01-01 | Stop reason: HOSPADM

## 2021-01-01 RX ORDER — CEPHALEXIN 500 MG/1
500 CAPSULE ORAL EVERY 12 HOURS SCHEDULED
Qty: 10 CAPSULE | Refills: 0 | Status: SHIPPED | OUTPATIENT
Start: 2021-01-01 | End: 2021-01-01

## 2021-01-01 RX ORDER — SODIUM CHLORIDE 9 MG/ML
125 INJECTION, SOLUTION INTRAVENOUS CONTINUOUS
Status: DISCONTINUED | OUTPATIENT
Start: 2021-01-01 | End: 2021-01-01 | Stop reason: HOSPADM

## 2021-01-01 RX ORDER — BACITRACIN, NEOMYCIN, POLYMYXIN B 400; 3.5; 5 [USP'U]/G; MG/G; [USP'U]/G
1 OINTMENT TOPICAL 2 TIMES DAILY
Status: DISCONTINUED | OUTPATIENT
Start: 2021-01-01 | End: 2021-01-01 | Stop reason: HOSPADM

## 2021-01-01 RX ORDER — FINASTERIDE 5 MG/1
5 TABLET, FILM COATED ORAL DAILY
Status: DISCONTINUED | OUTPATIENT
Start: 2021-01-01 | End: 2021-01-01

## 2021-01-01 RX ORDER — FINASTERIDE 5 MG/1
5 TABLET, FILM COATED ORAL DAILY
Status: CANCELLED | OUTPATIENT
Start: 2021-01-01

## 2021-01-01 RX ORDER — BISACODYL 10 MG
10 SUPPOSITORY, RECTAL RECTAL ONCE
Status: DISCONTINUED | OUTPATIENT
Start: 2021-01-01 | End: 2021-01-01 | Stop reason: HOSPADM

## 2021-01-01 RX ORDER — POTASSIUM CHLORIDE 14.9 MG/ML
20 INJECTION INTRAVENOUS
Status: DISPENSED | OUTPATIENT
Start: 2021-01-01 | End: 2021-01-01

## 2021-01-01 RX ORDER — EPHEDRINE SULFATE 50 MG/ML
INJECTION INTRAVENOUS AS NEEDED
Status: DISCONTINUED | OUTPATIENT
Start: 2021-01-01 | End: 2021-01-01

## 2021-01-01 RX ORDER — LIDOCAINE HYDROCHLORIDE AND EPINEPHRINE 10; 10 MG/ML; UG/ML
20 INJECTION, SOLUTION INFILTRATION; PERINEURAL ONCE
Status: COMPLETED | OUTPATIENT
Start: 2021-01-01 | End: 2021-01-01

## 2021-01-01 RX ORDER — ATROPA BELLADONNA AND OPIUM 16.2; 3 MG/1; MG/1
30 SUPPOSITORY RECTAL EVERY 8 HOURS PRN
Status: DISCONTINUED | OUTPATIENT
Start: 2021-01-01 | End: 2021-01-01 | Stop reason: HOSPADM

## 2021-01-01 RX ORDER — CEFAZOLIN SODIUM 1 G/3ML
INJECTION, POWDER, FOR SOLUTION INTRAMUSCULAR; INTRAVENOUS AS NEEDED
Status: DISCONTINUED | OUTPATIENT
Start: 2021-01-01 | End: 2021-01-01

## 2021-01-01 RX ORDER — POTASSIUM CHLORIDE 20 MEQ/1
20 TABLET, EXTENDED RELEASE ORAL ONCE
Status: DISCONTINUED | OUTPATIENT
Start: 2021-01-01 | End: 2021-01-01

## 2021-01-01 RX ORDER — ATROPA BELLADONNA AND OPIUM 16.2; 3 MG/1; MG/1
30 SUPPOSITORY RECTAL EVERY 8 HOURS PRN
Qty: 10 SUPPOSITORY | Refills: 0 | Status: SHIPPED | OUTPATIENT
Start: 2021-01-01 | End: 2021-01-01 | Stop reason: HOSPADM

## 2021-01-01 RX ORDER — PRAVASTATIN SODIUM 40 MG
40 TABLET ORAL
Status: DISCONTINUED | OUTPATIENT
Start: 2021-01-01 | End: 2021-01-01

## 2021-01-01 RX ORDER — DEXTROSE, SODIUM CHLORIDE, SODIUM LACTATE, POTASSIUM CHLORIDE, AND CALCIUM CHLORIDE 5; .6; .31; .03; .02 G/100ML; G/100ML; G/100ML; G/100ML; G/100ML
75 INJECTION, SOLUTION INTRAVENOUS CONTINUOUS
Status: DISCONTINUED | OUTPATIENT
Start: 2021-01-01 | End: 2021-01-01 | Stop reason: HOSPADM

## 2021-01-01 RX ORDER — SODIUM CHLORIDE, SODIUM LACTATE, POTASSIUM CHLORIDE, CALCIUM CHLORIDE 600; 310; 30; 20 MG/100ML; MG/100ML; MG/100ML; MG/100ML
20 INJECTION, SOLUTION INTRAVENOUS CONTINUOUS
Status: DISCONTINUED | OUTPATIENT
Start: 2021-01-01 | End: 2021-01-01 | Stop reason: HOSPADM

## 2021-01-01 RX ORDER — FENTANYL CITRATE/PF 50 MCG/ML
50 SYRINGE (ML) INJECTION
Status: DISCONTINUED | OUTPATIENT
Start: 2021-01-01 | End: 2021-01-01 | Stop reason: HOSPADM

## 2021-01-01 RX ORDER — LEVETIRACETAM 500 MG/1
500 TABLET ORAL EVERY 12 HOURS SCHEDULED
Status: DISCONTINUED | OUTPATIENT
Start: 2021-01-01 | End: 2021-01-01

## 2021-01-01 RX ORDER — CALCIUM CARBONATE 200(500)MG
500 TABLET,CHEWABLE ORAL DAILY PRN
Status: DISCONTINUED | OUTPATIENT
Start: 2021-01-01 | End: 2021-01-01 | Stop reason: HOSPADM

## 2021-01-01 RX ORDER — POTASSIUM CHLORIDE 20 MEQ/1
40 TABLET, EXTENDED RELEASE ORAL
Status: DISCONTINUED | OUTPATIENT
Start: 2021-01-01 | End: 2021-01-01

## 2021-01-01 RX ORDER — HYDROMORPHONE HCL IN WATER/PF 6 MG/30 ML
0.2 PATIENT CONTROLLED ANALGESIA SYRINGE INTRAVENOUS
Status: DISCONTINUED | OUTPATIENT
Start: 2021-01-01 | End: 2021-01-01 | Stop reason: HOSPADM

## 2021-01-01 RX ORDER — SENNOSIDES 8.6 MG
1 TABLET ORAL
Status: DISCONTINUED | OUTPATIENT
Start: 2021-01-01 | End: 2021-01-01 | Stop reason: HOSPADM

## 2021-01-01 RX ORDER — DIPHENHYDRAMINE HYDROCHLORIDE 50 MG/ML
INJECTION INTRAMUSCULAR; INTRAVENOUS CODE/TRAUMA/SEDATION MEDICATION
Status: COMPLETED | OUTPATIENT
Start: 2021-01-01 | End: 2021-01-01

## 2021-01-01 RX ORDER — POTASSIUM CHLORIDE 14.9 MG/ML
20 INJECTION INTRAVENOUS
Status: DISCONTINUED | OUTPATIENT
Start: 2021-01-01 | End: 2021-01-01

## 2021-01-01 RX ORDER — LEVETIRACETAM 500 MG/1
500 TABLET ORAL EVERY 12 HOURS SCHEDULED
COMMUNITY
End: 2021-01-01 | Stop reason: HOSPADM

## 2021-01-01 RX ORDER — CIPROFLOXACIN 500 MG/1
500 TABLET, FILM COATED ORAL EVERY 12 HOURS SCHEDULED
Status: COMPLETED | OUTPATIENT
Start: 2021-01-01 | End: 2021-01-01

## 2021-01-01 RX ORDER — LIDOCAINE HYDROCHLORIDE 20 MG/ML
JELLY TOPICAL
Status: COMPLETED
Start: 2021-01-01 | End: 2021-01-01

## 2021-01-01 RX ORDER — POLYETHYLENE GLYCOL 3350 17 G/17G
17 POWDER, FOR SOLUTION ORAL 2 TIMES DAILY
Status: DISCONTINUED | OUTPATIENT
Start: 2021-01-01 | End: 2021-01-01 | Stop reason: HOSPADM

## 2021-01-01 RX ORDER — HYDROCORTISONE 10 MG/1
10 TABLET ORAL 2 TIMES DAILY
Refills: 0
Start: 2021-01-01

## 2021-01-01 RX ORDER — POTASSIUM CHLORIDE 14.9 MG/ML
INJECTION INTRAVENOUS CONTINUOUS PRN
Status: DISCONTINUED | OUTPATIENT
Start: 2021-01-01 | End: 2021-01-01

## 2021-01-01 RX ORDER — HEPARIN SODIUM 1000 [USP'U]/ML
INJECTION, SOLUTION INTRAVENOUS; SUBCUTANEOUS CODE/TRAUMA/SEDATION MEDICATION
Status: COMPLETED | OUTPATIENT
Start: 2021-01-01 | End: 2021-01-01

## 2021-01-01 RX ORDER — SODIUM CHLORIDE 9 MG/ML
75 INJECTION, SOLUTION INTRAVENOUS CONTINUOUS
Status: CANCELLED | OUTPATIENT
Start: 2021-01-01

## 2021-01-01 RX ORDER — PROPOFOL 10 MG/ML
INJECTION, EMULSION INTRAVENOUS CONTINUOUS PRN
Status: DISCONTINUED | OUTPATIENT
Start: 2021-01-01 | End: 2021-01-01

## 2021-01-01 RX ORDER — ACETAMINOPHEN 325 MG/1
650 TABLET ORAL EVERY 6 HOURS PRN
Status: DISCONTINUED | OUTPATIENT
Start: 2021-01-01 | End: 2021-01-01

## 2021-01-01 RX ORDER — ASPIRIN 81 MG/1
81 TABLET ORAL DAILY
Start: 2021-01-01

## 2021-01-01 RX ADMIN — POTASSIUM CHLORIDE 40 MEQ: 1500 TABLET, EXTENDED RELEASE ORAL at 13:07

## 2021-01-01 RX ADMIN — CEPHALEXIN 500 MG: 500 CAPSULE ORAL at 05:09

## 2021-01-01 RX ADMIN — HEPARIN SODIUM 5000 UNITS: 5000 INJECTION INTRAVENOUS; SUBCUTANEOUS at 13:53

## 2021-01-01 RX ADMIN — DIBASIC SODIUM PHOSPHATE, MONOBASIC POTASSIUM PHOSPHATE AND MONOBASIC SODIUM PHOSPHATE 2 TABLET: 852; 155; 130 TABLET ORAL at 13:05

## 2021-01-01 RX ADMIN — ASPIRIN 81 MG: 81 TABLET, COATED ORAL at 08:52

## 2021-01-01 RX ADMIN — POTASSIUM CHLORIDE 40 MEQ: 1500 TABLET, EXTENDED RELEASE ORAL at 16:21

## 2021-01-01 RX ADMIN — POTASSIUM CHLORIDE 40 MEQ: 1500 TABLET, EXTENDED RELEASE ORAL at 13:05

## 2021-01-01 RX ADMIN — POTASSIUM CHLORIDE 40 MEQ: 1500 TABLET, EXTENDED RELEASE ORAL at 20:28

## 2021-01-01 RX ADMIN — HYDROCORTISONE 50 MG: 10 TABLET ORAL at 04:05

## 2021-01-01 RX ADMIN — HEPARIN SODIUM 5000 UNITS: 5000 INJECTION INTRAVENOUS; SUBCUTANEOUS at 01:20

## 2021-01-01 RX ADMIN — Medication 6 MG: at 21:02

## 2021-01-01 RX ADMIN — FENTANYL CITRATE 25 MCG: 50 INJECTION INTRAMUSCULAR; INTRAVENOUS at 12:21

## 2021-01-01 RX ADMIN — LEVETIRACETAM 500 MG: 500 TABLET, FILM COATED ORAL at 22:58

## 2021-01-01 RX ADMIN — ACETAMINOPHEN 650 MG: 325 TABLET, FILM COATED ORAL at 17:44

## 2021-01-01 RX ADMIN — METOPROLOL TARTRATE 25 MG: 25 TABLET, FILM COATED ORAL at 08:52

## 2021-01-01 RX ADMIN — POTASSIUM CHLORIDE 40 MEQ: 1500 TABLET, EXTENDED RELEASE ORAL at 09:55

## 2021-01-01 RX ADMIN — BACITRACIN ZINC NEOMYCIN SULFATE POLYMYXIN B SULFATE 1 LARGE APPLICATION: 400; 3.5; 5 OINTMENT TOPICAL at 22:20

## 2021-01-01 RX ADMIN — SODIUM CHLORIDE 75 ML/HR: 0.9 INJECTION, SOLUTION INTRAVENOUS at 06:48

## 2021-01-01 RX ADMIN — DEXAMETHASONE SODIUM PHOSPHATE 4 MG: 4 INJECTION, SOLUTION INTRA-ARTICULAR; INTRALESIONAL; INTRAMUSCULAR; INTRAVENOUS; SOFT TISSUE at 13:30

## 2021-01-01 RX ADMIN — SODIUM CHLORIDE 75 ML/HR: 0.9 INJECTION, SOLUTION INTRAVENOUS at 21:00

## 2021-01-01 RX ADMIN — PROPOFOL 130 MG: 10 INJECTION, EMULSION INTRAVENOUS at 16:22

## 2021-01-01 RX ADMIN — LEVETIRACETAM 250 MG: 500 TABLET, FILM COATED ORAL at 08:13

## 2021-01-01 RX ADMIN — POTASSIUM CHLORIDE 20 MEQ: 14.9 INJECTION, SOLUTION INTRAVENOUS at 08:05

## 2021-01-01 RX ADMIN — MIDAZOLAM 1 MG: 1 INJECTION INTRAMUSCULAR; INTRAVENOUS at 12:21

## 2021-01-01 RX ADMIN — DIBASIC SODIUM PHOSPHATE, MONOBASIC POTASSIUM PHOSPHATE AND MONOBASIC SODIUM PHOSPHATE 2 TABLET: 852; 155; 130 TABLET ORAL at 12:05

## 2021-01-01 RX ADMIN — HYDROCORTISONE 10 MG: 10 TABLET ORAL at 07:50

## 2021-01-01 RX ADMIN — POTASSIUM CHLORIDE 40 MEQ: 1500 TABLET, EXTENDED RELEASE ORAL at 12:05

## 2021-01-01 RX ADMIN — METOPROLOL TARTRATE 25 MG: 25 TABLET, FILM COATED ORAL at 08:38

## 2021-01-01 RX ADMIN — CIPROFLOXACIN 400 MG: 2 INJECTION, SOLUTION INTRAVENOUS at 13:07

## 2021-01-01 RX ADMIN — HEPARIN SODIUM 5000 UNITS: 5000 INJECTION INTRAVENOUS; SUBCUTANEOUS at 06:06

## 2021-01-01 RX ADMIN — DIBASIC SODIUM PHOSPHATE, MONOBASIC POTASSIUM PHOSPHATE AND MONOBASIC SODIUM PHOSPHATE 2 TABLET: 852; 155; 130 TABLET ORAL at 15:59

## 2021-01-01 RX ADMIN — POTASSIUM CHLORIDE 40 MEQ: 1500 TABLET, EXTENDED RELEASE ORAL at 18:03

## 2021-01-01 RX ADMIN — LEVETIRACETAM 500 MG: 500 TABLET, FILM COATED ORAL at 22:24

## 2021-01-01 RX ADMIN — HYDROCORTISONE 50 MG: 10 TABLET ORAL at 23:00

## 2021-01-01 RX ADMIN — POTASSIUM PHOSPHATE, MONOBASIC AND POTASSIUM PHOSPHATE, DIBASIC 30 MMOL: 224; 236 INJECTION, SOLUTION, CONCENTRATE INTRAVENOUS at 17:28

## 2021-01-01 RX ADMIN — FINASTERIDE 5 MG: 5 TABLET, FILM COATED ORAL at 08:39

## 2021-01-01 RX ADMIN — POTASSIUM CHLORIDE 20 MEQ: 14.9 INJECTION, SOLUTION INTRAVENOUS at 08:53

## 2021-01-01 RX ADMIN — Medication 250 MG: at 09:07

## 2021-01-01 RX ADMIN — HYDROCORTISONE 10 MG: 10 TABLET ORAL at 17:23

## 2021-01-01 RX ADMIN — BACITRACIN ZINC NEOMYCIN SULFATE POLYMYXIN B SULFATE 1 LARGE APPLICATION: 400; 3.5; 5 OINTMENT TOPICAL at 08:46

## 2021-01-01 RX ADMIN — CIPROFLOXACIN HYDROCHLORIDE 500 MG: 500 TABLET, FILM COATED ORAL at 09:35

## 2021-01-01 RX ADMIN — HYDROCORTISONE 10 MG: 10 TABLET ORAL at 17:41

## 2021-01-01 RX ADMIN — TETANUS TOXOID, REDUCED DIPHTHERIA TOXOID AND ACELLULAR PERTUSSIS VACCINE, ADSORBED 0.5 ML: 5; 2.5; 8; 8; 2.5 SUSPENSION INTRAMUSCULAR at 03:45

## 2021-01-01 RX ADMIN — HYDROCORTISONE 10 MG: 10 TABLET ORAL at 10:47

## 2021-01-01 RX ADMIN — SODIUM CHLORIDE 1000 ML: 0.9 INJECTION, SOLUTION INTRAVENOUS at 18:14

## 2021-01-01 RX ADMIN — METOPROLOL TARTRATE 25 MG: 25 TABLET, FILM COATED ORAL at 09:17

## 2021-01-01 RX ADMIN — SODIUM CHLORIDE, SODIUM LACTATE, POTASSIUM CHLORIDE, AND CALCIUM CHLORIDE 125 ML/HR: .6; .31; .03; .02 INJECTION, SOLUTION INTRAVENOUS at 07:17

## 2021-01-01 RX ADMIN — LEVETIRACETAM 500 MG: 500 TABLET, FILM COATED ORAL at 07:50

## 2021-01-01 RX ADMIN — ASPIRIN 81 MG: 81 TABLET, COATED ORAL at 08:06

## 2021-01-01 RX ADMIN — CEFEPIME HYDROCHLORIDE 1000 MG: 2 INJECTION, POWDER, FOR SOLUTION INTRAVENOUS at 05:44

## 2021-01-01 RX ADMIN — FENTANYL CITRATE 50 MCG: 50 INJECTION INTRAMUSCULAR; INTRAVENOUS at 12:34

## 2021-01-01 RX ADMIN — SODIUM CHLORIDE 500 ML: 0.9 INJECTION, SOLUTION INTRAVENOUS at 13:46

## 2021-01-01 RX ADMIN — POTASSIUM CHLORIDE 40 MEQ: 1500 TABLET, EXTENDED RELEASE ORAL at 06:27

## 2021-01-01 RX ADMIN — POTASSIUM CHLORIDE 40 MEQ: 1500 TABLET, EXTENDED RELEASE ORAL at 08:52

## 2021-01-01 RX ADMIN — Medication 3 MG: at 21:09

## 2021-01-01 RX ADMIN — LIDOCAINE HYDROCHLORIDE 1 APPLICATION: 20 JELLY TOPICAL at 23:39

## 2021-01-01 RX ADMIN — FINASTERIDE 5 MG: 5 TABLET, FILM COATED ORAL at 08:52

## 2021-01-01 RX ADMIN — FENTANYL CITRATE 25 MCG: 50 INJECTION, SOLUTION INTRAMUSCULAR; INTRAVENOUS at 13:55

## 2021-01-01 RX ADMIN — BACITRACIN ZINC NEOMYCIN SULFATE POLYMYXIN B SULFATE 1 LARGE APPLICATION: 400; 3.5; 5 OINTMENT TOPICAL at 16:31

## 2021-01-01 RX ADMIN — ONDANSETRON 4 MG: 2 INJECTION INTRAMUSCULAR; INTRAVENOUS at 13:30

## 2021-01-01 RX ADMIN — FENTANYL CITRATE 50 MCG: 50 INJECTION INTRAMUSCULAR; INTRAVENOUS at 11:42

## 2021-01-01 RX ADMIN — FENTANYL CITRATE 50 MCG: 50 INJECTION, SOLUTION INTRAMUSCULAR; INTRAVENOUS at 08:12

## 2021-01-01 RX ADMIN — POTASSIUM CHLORIDE 40 MEQ: 1500 TABLET, EXTENDED RELEASE ORAL at 11:51

## 2021-01-01 RX ADMIN — CEFEPIME HYDROCHLORIDE 2000 MG: 2 INJECTION, POWDER, FOR SOLUTION INTRAVENOUS at 12:59

## 2021-01-01 RX ADMIN — FINASTERIDE 5 MG: 5 TABLET, FILM COATED ORAL at 09:35

## 2021-01-01 RX ADMIN — CIPROFLOXACIN HYDROCHLORIDE 500 MG: 500 TABLET, FILM COATED ORAL at 09:17

## 2021-01-01 RX ADMIN — DIBASIC SODIUM PHOSPHATE, MONOBASIC POTASSIUM PHOSPHATE AND MONOBASIC SODIUM PHOSPHATE 2 TABLET: 852; 155; 130 TABLET ORAL at 09:31

## 2021-01-01 RX ADMIN — FENTANYL CITRATE 25 MCG: 50 INJECTION INTRAMUSCULAR; INTRAVENOUS at 16:30

## 2021-01-01 RX ADMIN — POTASSIUM CHLORIDE 40 MEQ: 1500 TABLET, EXTENDED RELEASE ORAL at 17:18

## 2021-01-01 RX ADMIN — HEPARIN SODIUM 5000 UNITS: 5000 INJECTION INTRAVENOUS; SUBCUTANEOUS at 13:02

## 2021-01-01 RX ADMIN — POTASSIUM CHLORIDE 40 MEQ: 1500 TABLET, EXTENDED RELEASE ORAL at 06:51

## 2021-01-01 RX ADMIN — POTASSIUM CHLORIDE 40 MEQ: 1500 TABLET, EXTENDED RELEASE ORAL at 17:22

## 2021-01-01 RX ADMIN — ASPIRIN 81 MG: 81 TABLET, COATED ORAL at 09:16

## 2021-01-01 RX ADMIN — SODIUM CHLORIDE 100 ML/HR: 0.9 INJECTION, SOLUTION INTRAVENOUS at 10:00

## 2021-01-01 RX ADMIN — FENTANYL CITRATE 25 MCG: 50 INJECTION, SOLUTION INTRAMUSCULAR; INTRAVENOUS at 14:16

## 2021-01-01 RX ADMIN — EPHEDRINE SULFATE 10 MG: 50 INJECTION, SOLUTION INTRAVENOUS at 16:46

## 2021-01-01 RX ADMIN — SODIUM CHLORIDE, SODIUM LACTATE, POTASSIUM CHLORIDE, AND CALCIUM CHLORIDE: .6; .31; .03; .02 INJECTION, SOLUTION INTRAVENOUS at 16:00

## 2021-01-01 RX ADMIN — FINASTERIDE 5 MG: 5 TABLET, FILM COATED ORAL at 07:50

## 2021-01-01 RX ADMIN — POTASSIUM CHLORIDE 40 MEQ: 1500 TABLET, EXTENDED RELEASE ORAL at 00:25

## 2021-01-01 RX ADMIN — POTASSIUM CHLORIDE 20 MEQ: 14.9 INJECTION, SOLUTION INTRAVENOUS at 11:11

## 2021-01-01 RX ADMIN — POTASSIUM CHLORIDE 40 MEQ: 1500 TABLET, EXTENDED RELEASE ORAL at 06:01

## 2021-01-01 RX ADMIN — DIBASIC SODIUM PHOSPHATE, MONOBASIC POTASSIUM PHOSPHATE AND MONOBASIC SODIUM PHOSPHATE 2 TABLET: 852; 155; 130 TABLET ORAL at 22:58

## 2021-01-01 RX ADMIN — DIBASIC SODIUM PHOSPHATE, MONOBASIC POTASSIUM PHOSPHATE AND MONOBASIC SODIUM PHOSPHATE 2 TABLET: 852; 155; 130 TABLET ORAL at 16:38

## 2021-01-01 RX ADMIN — LOPERAMIDE HYDROCHLORIDE 2 MG: 2 CAPSULE ORAL at 15:00

## 2021-01-01 RX ADMIN — DIPHENHYDRAMINE HYDROCHLORIDE 25 MG: 50 INJECTION, SOLUTION INTRAMUSCULAR; INTRAVENOUS at 12:00

## 2021-01-01 RX ADMIN — POTASSIUM CHLORIDE 40 MEQ: 29.8 INJECTION, SOLUTION INTRAVENOUS at 20:52

## 2021-01-01 RX ADMIN — CEFAZOLIN 1000 MG: 1 INJECTION, POWDER, FOR SOLUTION INTRAMUSCULAR; INTRAVENOUS at 16:33

## 2021-01-01 RX ADMIN — SENNOSIDES 8.6 MG: 8.6 TABLET, FILM COATED ORAL at 22:23

## 2021-01-01 RX ADMIN — POTASSIUM CHLORIDE 40 MEQ: 1500 TABLET, EXTENDED RELEASE ORAL at 17:06

## 2021-01-01 RX ADMIN — FENTANYL CITRATE 25 MCG: 50 INJECTION, SOLUTION INTRAMUSCULAR; INTRAVENOUS at 08:20

## 2021-01-01 RX ADMIN — DOCUSATE SODIUM 100 MG: 100 CAPSULE, LIQUID FILLED ORAL at 16:30

## 2021-01-01 RX ADMIN — SODIUM CHLORIDE 125 ML/HR: 0.9 INJECTION, SOLUTION INTRAVENOUS at 05:22

## 2021-01-01 RX ADMIN — LIDOCAINE HYDROCHLORIDE 50 MG: 10 INJECTION, SOLUTION EPIDURAL; INFILTRATION; INTRACAUDAL; PERINEURAL at 16:22

## 2021-01-01 RX ADMIN — IOHEXOL 91 ML: 350 INJECTION, SOLUTION INTRAVENOUS at 12:53

## 2021-01-01 RX ADMIN — ENOXAPARIN SODIUM 40 MG: 40 INJECTION SUBCUTANEOUS at 08:37

## 2021-01-01 RX ADMIN — HYDROCORTISONE 50 MG: 10 TABLET ORAL at 09:00

## 2021-01-01 RX ADMIN — DEXAMETHASONE SODIUM PHOSPHATE 5 MG: 10 INJECTION, SOLUTION INTRAMUSCULAR; INTRAVENOUS at 16:36

## 2021-01-01 RX ADMIN — DIBASIC SODIUM PHOSPHATE, MONOBASIC POTASSIUM PHOSPHATE AND MONOBASIC SODIUM PHOSPHATE 2 TABLET: 852; 155; 130 TABLET ORAL at 09:14

## 2021-01-01 RX ADMIN — LIDOCAINE HYDROCHLORIDE 1 APPLICATION: 20 JELLY TOPICAL at 01:04

## 2021-01-01 RX ADMIN — FINASTERIDE 5 MG: 5 TABLET, FILM COATED ORAL at 09:07

## 2021-01-01 RX ADMIN — ASPIRIN 81 MG: 81 TABLET, COATED ORAL at 09:08

## 2021-01-01 RX ADMIN — DIBASIC SODIUM PHOSPHATE, MONOBASIC POTASSIUM PHOSPHATE AND MONOBASIC SODIUM PHOSPHATE 2 TABLET: 852; 155; 130 TABLET ORAL at 10:25

## 2021-01-01 RX ADMIN — POTASSIUM CHLORIDE 40 MEQ: 1500 TABLET, EXTENDED RELEASE ORAL at 06:36

## 2021-01-01 RX ADMIN — FINASTERIDE 5 MG: 5 TABLET, FILM COATED ORAL at 08:47

## 2021-01-01 RX ADMIN — BACITRACIN ZINC NEOMYCIN SULFATE POLYMYXIN B SULFATE 1 LARGE APPLICATION: 400; 3.5; 5 OINTMENT TOPICAL at 08:38

## 2021-01-01 RX ADMIN — FENTANYL CITRATE 25 MCG: 50 INJECTION, SOLUTION INTRAMUSCULAR; INTRAVENOUS at 08:22

## 2021-01-01 RX ADMIN — LIDOCAINE HYDROCHLORIDE AND EPINEPHRINE 20 ML: 10; 10 INJECTION, SOLUTION INFILTRATION; PERINEURAL at 03:09

## 2021-01-01 RX ADMIN — POTASSIUM CHLORIDE 40 MEQ: 1500 TABLET, EXTENDED RELEASE ORAL at 13:54

## 2021-01-01 RX ADMIN — FINASTERIDE 5 MG: 5 TABLET, FILM COATED ORAL at 09:55

## 2021-01-01 RX ADMIN — DIBASIC SODIUM PHOSPHATE, MONOBASIC POTASSIUM PHOSPHATE AND MONOBASIC SODIUM PHOSPHATE 2 TABLET: 852; 155; 130 TABLET ORAL at 16:21

## 2021-01-01 RX ADMIN — LEVETIRACETAM 500 MG: 500 TABLET, FILM COATED ORAL at 08:45

## 2021-01-01 RX ADMIN — FINASTERIDE 5 MG: 5 TABLET, FILM COATED ORAL at 08:13

## 2021-01-01 RX ADMIN — SODIUM CHLORIDE 500 ML: 0.9 INJECTION, SOLUTION INTRAVENOUS at 03:37

## 2021-01-01 RX ADMIN — POTASSIUM CHLORIDE 40 MEQ: 29.8 INJECTION, SOLUTION INTRAVENOUS at 16:15

## 2021-01-01 RX ADMIN — LEVETIRACETAM 500 MG: 500 TABLET, FILM COATED ORAL at 10:25

## 2021-01-01 RX ADMIN — ASPIRIN 81 MG: 81 TABLET, COATED ORAL at 08:39

## 2021-01-01 RX ADMIN — DOCUSATE SODIUM 100 MG: 100 CAPSULE, LIQUID FILLED ORAL at 08:46

## 2021-01-01 RX ADMIN — CEPHALEXIN 500 MG: 500 CAPSULE ORAL at 18:12

## 2021-01-01 RX ADMIN — HYDROCORTISONE 50 MG: 10 TABLET ORAL at 10:26

## 2021-01-01 RX ADMIN — SODIUM CHLORIDE, SODIUM LACTATE, POTASSIUM CHLORIDE, AND CALCIUM CHLORIDE 100 ML/HR: .6; .31; .03; .02 INJECTION, SOLUTION INTRAVENOUS at 06:35

## 2021-01-01 RX ADMIN — HEPARIN SODIUM 5000 UNITS: 5000 INJECTION INTRAVENOUS; SUBCUTANEOUS at 21:02

## 2021-01-01 RX ADMIN — Medication 250 MG: at 08:39

## 2021-01-01 RX ADMIN — DEXTROSE, SODIUM CHLORIDE, SODIUM LACTATE, POTASSIUM CHLORIDE, AND CALCIUM CHLORIDE 75 ML/HR: 5; .6; .31; .03; .02 INJECTION, SOLUTION INTRAVENOUS at 18:07

## 2021-01-01 RX ADMIN — ACETAMINOPHEN 650 MG: 325 TABLET, FILM COATED ORAL at 12:16

## 2021-01-01 RX ADMIN — FINASTERIDE 5 MG: 5 TABLET, FILM COATED ORAL at 08:37

## 2021-01-01 RX ADMIN — LEVETIRACETAM 500 MG: 500 TABLET, FILM COATED ORAL at 09:16

## 2021-01-01 RX ADMIN — METOPROLOL TARTRATE 25 MG: 25 TABLET, FILM COATED ORAL at 08:46

## 2021-01-01 RX ADMIN — POTASSIUM CHLORIDE 40 MEQ: 29.8 INJECTION, SOLUTION INTRAVENOUS at 20:16

## 2021-01-01 RX ADMIN — HYDROCHLOROTHIAZIDE 25 MG: 25 TABLET ORAL at 17:45

## 2021-01-01 RX ADMIN — SODIUM CHLORIDE, SODIUM LACTATE, POTASSIUM CHLORIDE, AND CALCIUM CHLORIDE: .6; .31; .03; .02 INJECTION, SOLUTION INTRAVENOUS at 13:25

## 2021-01-01 RX ADMIN — METOPROLOL TARTRATE 25 MG: 25 TABLET, FILM COATED ORAL at 09:07

## 2021-01-01 RX ADMIN — FENTANYL CITRATE 50 MCG: 50 INJECTION, SOLUTION INTRAMUSCULAR; INTRAVENOUS at 14:31

## 2021-01-01 RX ADMIN — DIBASIC SODIUM PHOSPHATE, MONOBASIC POTASSIUM PHOSPHATE AND MONOBASIC SODIUM PHOSPHATE 2 TABLET: 852; 155; 130 TABLET ORAL at 21:34

## 2021-01-01 RX ADMIN — DEXTROSE AND SODIUM CHLORIDE 75 ML/HR: 5; .45 INJECTION, SOLUTION INTRAVENOUS at 23:41

## 2021-01-01 RX ADMIN — DEXTROSE, SODIUM CHLORIDE, SODIUM LACTATE, POTASSIUM CHLORIDE, AND CALCIUM CHLORIDE 75 ML/HR: 5; .6; .31; .03; .02 INJECTION, SOLUTION INTRAVENOUS at 03:52

## 2021-01-01 RX ADMIN — ASPIRIN 81 MG: 81 TABLET, COATED ORAL at 09:55

## 2021-01-01 RX ADMIN — FINASTERIDE 5 MG: 5 TABLET, FILM COATED ORAL at 08:06

## 2021-01-01 RX ADMIN — IOHEXOL 85 ML: 350 INJECTION, SOLUTION INTRAVENOUS at 12:39

## 2021-01-01 RX ADMIN — FENTANYL CITRATE 25 MCG: 50 INJECTION INTRAMUSCULAR; INTRAVENOUS at 17:12

## 2021-01-01 RX ADMIN — SODIUM CHLORIDE, SODIUM LACTATE, POTASSIUM CHLORIDE, AND CALCIUM CHLORIDE 100 ML/HR: .6; .31; .03; .02 INJECTION, SOLUTION INTRAVENOUS at 00:05

## 2021-01-01 RX ADMIN — FINASTERIDE 5 MG: 5 TABLET, FILM COATED ORAL at 08:46

## 2021-01-01 RX ADMIN — PROPOFOL 50 MG: 10 INJECTION, EMULSION INTRAVENOUS at 08:12

## 2021-01-01 RX ADMIN — FINASTERIDE 5 MG: 5 TABLET, FILM COATED ORAL at 10:27

## 2021-01-01 RX ADMIN — METOPROLOL TARTRATE 25 MG: 25 TABLET, FILM COATED ORAL at 21:02

## 2021-01-01 RX ADMIN — MAGNESIUM OXIDE TAB 400 MG (241.3 MG ELEMENTAL MG) 400 MG: 400 (241.3 MG) TAB at 13:54

## 2021-01-01 RX ADMIN — ONDANSETRON 4 MG: 2 INJECTION INTRAMUSCULAR; INTRAVENOUS at 16:36

## 2021-01-01 RX ADMIN — SIMETHICONE CHEW TAB 80 MG 80 MG: 80 TABLET ORAL at 23:41

## 2021-01-01 RX ADMIN — LOPERAMIDE HYDROCHLORIDE 2 MG: 2 CAPSULE ORAL at 18:13

## 2021-01-01 RX ADMIN — LEVETIRACETAM 500 MG: 500 TABLET, FILM COATED ORAL at 01:36

## 2021-01-01 RX ADMIN — SODIUM CHLORIDE, SODIUM LACTATE, POTASSIUM CHLORIDE, AND CALCIUM CHLORIDE 100 ML/HR: .6; .31; .03; .02 INJECTION, SOLUTION INTRAVENOUS at 20:15

## 2021-01-01 RX ADMIN — HYDROCORTISONE 50 MG: 10 TABLET ORAL at 16:38

## 2021-01-01 RX ADMIN — ASPIRIN 81 MG: 81 TABLET, COATED ORAL at 08:13

## 2021-01-01 RX ADMIN — FINASTERIDE 5 MG: 5 TABLET, FILM COATED ORAL at 09:16

## 2021-01-01 RX ADMIN — LEVETIRACETAM 500 MG: 500 TABLET, FILM COATED ORAL at 08:52

## 2021-01-01 RX ADMIN — LOPERAMIDE HYDROCHLORIDE 2 MG: 2 CAPSULE ORAL at 17:06

## 2021-01-01 RX ADMIN — POTASSIUM CHLORIDE 40 MEQ: 29.8 INJECTION, SOLUTION INTRAVENOUS at 15:51

## 2021-01-01 RX ADMIN — POLYETHYLENE GLYCOL 3350 17 G: 17 POWDER, FOR SOLUTION ORAL at 09:23

## 2021-01-01 RX ADMIN — LOPERAMIDE HYDROCHLORIDE 2 MG: 2 CAPSULE ORAL at 23:16

## 2021-01-01 RX ADMIN — HYDROCORTISONE 10 MG: 10 TABLET ORAL at 21:36

## 2021-01-01 RX ADMIN — LEVETIRACETAM 500 MG: 500 TABLET, FILM COATED ORAL at 08:06

## 2021-01-01 RX ADMIN — MIDAZOLAM 1 MG: 1 INJECTION INTRAMUSCULAR; INTRAVENOUS at 12:40

## 2021-01-01 RX ADMIN — HEPARIN SODIUM 3000 UNITS: 1000 INJECTION INTRAVENOUS; SUBCUTANEOUS at 12:18

## 2021-01-01 RX ADMIN — Medication 250 MG: at 18:12

## 2021-01-01 RX ADMIN — DEXTROSE, SODIUM CHLORIDE, SODIUM LACTATE, POTASSIUM CHLORIDE, AND CALCIUM CHLORIDE 75 ML/HR: 5; .6; .31; .03; .02 INJECTION, SOLUTION INTRAVENOUS at 04:50

## 2021-01-01 RX ADMIN — ACETAMINOPHEN 650 MG: 325 TABLET, FILM COATED ORAL at 23:57

## 2021-01-01 RX ADMIN — Medication 12.5 MG: at 11:45

## 2021-01-01 RX ADMIN — SODIUM CHLORIDE, SODIUM LACTATE, POTASSIUM CHLORIDE, AND CALCIUM CHLORIDE 50 ML/HR: .6; .31; .03; .02 INJECTION, SOLUTION INTRAVENOUS at 09:52

## 2021-01-01 RX ADMIN — ONDANSETRON 4 MG: 2 INJECTION INTRAMUSCULAR; INTRAVENOUS at 08:18

## 2021-01-01 RX ADMIN — POTASSIUM CHLORIDE 40 MEQ: 29.8 INJECTION, SOLUTION INTRAVENOUS at 23:41

## 2021-01-01 RX ADMIN — POTASSIUM CHLORIDE 40 MEQ: 1500 TABLET, EXTENDED RELEASE ORAL at 04:05

## 2021-01-01 RX ADMIN — ACETAMINOPHEN 650 MG: 325 TABLET, FILM COATED ORAL at 17:12

## 2021-01-01 RX ADMIN — DOCUSATE SODIUM 100 MG: 100 CAPSULE, LIQUID FILLED ORAL at 22:25

## 2021-01-01 RX ADMIN — Medication 250 MG: at 17:06

## 2021-01-01 RX ADMIN — LEVETIRACETAM 500 MG: 500 TABLET, FILM COATED ORAL at 09:30

## 2021-01-01 RX ADMIN — ASPIRIN 81 MG: 81 TABLET, COATED ORAL at 09:30

## 2021-01-01 RX ADMIN — SODIUM CHLORIDE 125 ML/HR: 0.9 INJECTION, SOLUTION INTRAVENOUS at 17:44

## 2021-01-01 RX ADMIN — METOPROLOL TARTRATE 25 MG: 25 TABLET, FILM COATED ORAL at 08:18

## 2021-01-01 RX ADMIN — POTASSIUM CHLORIDE 20 MEQ: 14.9 INJECTION, SOLUTION INTRAVENOUS at 04:05

## 2021-01-01 RX ADMIN — CIPROFLOXACIN HYDROCHLORIDE 500 MG: 500 TABLET, FILM COATED ORAL at 21:09

## 2021-01-01 RX ADMIN — HYDROCORTISONE 10 MG: 10 TABLET ORAL at 08:14

## 2021-01-01 RX ADMIN — POTASSIUM CHLORIDE 40 MEQ: 1500 TABLET, EXTENDED RELEASE ORAL at 23:08

## 2021-01-01 RX ADMIN — HEPARIN SODIUM 5000 UNITS: 5000 INJECTION INTRAVENOUS; SUBCUTANEOUS at 14:59

## 2021-01-01 RX ADMIN — POTASSIUM CHLORIDE 60 MEQ: 20 SOLUTION ORAL at 17:08

## 2021-01-01 RX ADMIN — POTASSIUM CHLORIDE 20 MEQ: 14.9 INJECTION, SOLUTION INTRAVENOUS at 07:06

## 2021-01-01 RX ADMIN — Medication 10 ML: at 11:56

## 2021-01-01 RX ADMIN — DEXTROSE, SODIUM CHLORIDE, SODIUM LACTATE, POTASSIUM CHLORIDE, AND CALCIUM CHLORIDE 75 ML/HR: 5; .6; .31; .03; .02 INJECTION, SOLUTION INTRAVENOUS at 16:29

## 2021-01-01 RX ADMIN — HEPARIN SODIUM 5000 UNITS: 5000 INJECTION INTRAVENOUS; SUBCUTANEOUS at 07:06

## 2021-01-01 RX ADMIN — ASPIRIN 81 MG: 81 TABLET, COATED ORAL at 08:45

## 2021-01-01 RX ADMIN — POTASSIUM CHLORIDE 40 MEQ: 1500 TABLET, EXTENDED RELEASE ORAL at 00:58

## 2021-01-01 RX ADMIN — SODIUM CHLORIDE, SODIUM LACTATE, POTASSIUM CHLORIDE, AND CALCIUM CHLORIDE 75 ML/HR: .6; .31; .03; .02 INJECTION, SOLUTION INTRAVENOUS at 13:07

## 2021-01-01 RX ADMIN — LOPERAMIDE HYDROCHLORIDE 2 MG: 2 CAPSULE ORAL at 02:53

## 2021-01-01 RX ADMIN — CEPHALEXIN 500 MG: 500 CAPSULE ORAL at 11:11

## 2021-01-01 RX ADMIN — PHENYLEPHRINE HYDROCHLORIDE 100 MCG: 10 INJECTION INTRAVENOUS at 13:41

## 2021-01-01 RX ADMIN — HYDROCHLOROTHIAZIDE 25 MG: 25 TABLET ORAL at 08:39

## 2021-01-01 RX ADMIN — METOPROLOL TARTRATE 25 MG: 25 TABLET, FILM COATED ORAL at 09:36

## 2021-01-01 RX ADMIN — HYDROCORTISONE 10 MG: 10 TABLET ORAL at 18:13

## 2021-01-01 RX ADMIN — HYDROCORTISONE 10 MG: 10 TABLET ORAL at 08:54

## 2021-01-01 RX ADMIN — POLYETHYLENE GLYCOL 3350 17 G: 17 POWDER, FOR SOLUTION ORAL at 13:06

## 2021-01-01 RX ADMIN — POLYETHYLENE GLYCOL 3350 17 G: 17 POWDER, FOR SOLUTION ORAL at 01:15

## 2021-01-01 RX ADMIN — HYDROCORTISONE 50 MG: 10 TABLET ORAL at 09:17

## 2021-01-01 RX ADMIN — Medication 250 MG: at 08:17

## 2021-01-01 RX ADMIN — CEPHALEXIN 500 MG: 500 CAPSULE ORAL at 23:16

## 2021-01-01 RX ADMIN — POTASSIUM CHLORIDE 40 MEQ: 1500 TABLET, EXTENDED RELEASE ORAL at 06:31

## 2021-01-01 RX ADMIN — LOPERAMIDE HYDROCHLORIDE 2 MG: 2 CAPSULE ORAL at 19:03

## 2021-01-01 RX ADMIN — POTASSIUM CHLORIDE 20 MEQ: 14.9 INJECTION, SOLUTION INTRAVENOUS at 06:43

## 2021-01-01 RX ADMIN — LIDOCAINE HYDROCHLORIDE 50 MG: 10 INJECTION, SOLUTION EPIDURAL; INFILTRATION; INTRACAUDAL; PERINEURAL at 08:12

## 2021-01-01 RX ADMIN — EPHEDRINE SULFATE 5 MG: 50 INJECTION, SOLUTION INTRAVENOUS at 16:23

## 2021-01-01 RX ADMIN — SODIUM CHLORIDE FOR IRRIGATION 3000 ML: 0.9 SOLUTION IRRIGATION at 19:15

## 2021-01-01 RX ADMIN — POTASSIUM CHLORIDE 40 MEQ: 1500 TABLET, EXTENDED RELEASE ORAL at 14:55

## 2021-01-01 RX ADMIN — HEPARIN SODIUM 5000 UNITS: 5000 INJECTION INTRAVENOUS; SUBCUTANEOUS at 13:54

## 2021-01-01 RX ADMIN — PROPOFOL 100 MG: 10 INJECTION, EMULSION INTRAVENOUS at 13:30

## 2021-01-01 RX ADMIN — ASPIRIN 81 MG: 81 TABLET, COATED ORAL at 08:18

## 2021-01-01 RX ADMIN — IOHEXOL 85 ML: 350 INJECTION, SOLUTION INTRAVENOUS at 15:48

## 2021-01-01 RX ADMIN — HYDROCORTISONE 50 MG: 10 TABLET ORAL at 21:49

## 2021-01-01 RX ADMIN — EPHEDRINE SULFATE 5 MG: 50 INJECTION, SOLUTION INTRAVENOUS at 16:36

## 2021-01-01 RX ADMIN — FENTANYL CITRATE 25 MCG: 50 INJECTION, SOLUTION INTRAMUSCULAR; INTRAVENOUS at 13:47

## 2021-01-01 RX ADMIN — ATORVASTATIN CALCIUM 40 MG: 40 TABLET, FILM COATED ORAL at 16:30

## 2021-01-01 RX ADMIN — FINASTERIDE 5 MG: 5 TABLET, FILM COATED ORAL at 08:18

## 2021-01-01 RX ADMIN — CEPHALEXIN 500 MG: 500 CAPSULE ORAL at 11:45

## 2021-01-01 RX ADMIN — PROPOFOL 100 MCG/KG/MIN: 10 INJECTION, EMULSION INTRAVENOUS at 08:12

## 2021-01-01 RX ADMIN — LEVETIRACETAM 500 MG: 500 TABLET, FILM COATED ORAL at 21:48

## 2021-01-01 RX ADMIN — HYDROCORTISONE 10 MG: 10 TABLET ORAL at 10:55

## 2021-01-01 RX ADMIN — HYDROCORTISONE 50 MG: 10 TABLET ORAL at 16:27

## 2021-01-01 RX ADMIN — HYDROCORTISONE 10 MG: 10 TABLET ORAL at 15:32

## 2021-01-01 RX ADMIN — FENTANYL CITRATE 25 MCG: 50 INJECTION, SOLUTION INTRAMUSCULAR; INTRAVENOUS at 13:40

## 2021-01-01 RX ADMIN — POTASSIUM CHLORIDE: 14.9 INJECTION, SOLUTION INTRAVENOUS at 16:42

## 2021-01-01 RX ADMIN — CEFEPIME HYDROCHLORIDE 2000 MG: 2 INJECTION, POWDER, FOR SOLUTION INTRAVENOUS at 01:13

## 2021-01-01 RX ADMIN — ASPIRIN 81 MG: 81 TABLET, COATED ORAL at 10:25

## 2021-01-01 RX ADMIN — POTASSIUM CHLORIDE 40 MEQ: 29.8 INJECTION, SOLUTION INTRAVENOUS at 17:39

## 2021-01-01 RX ADMIN — ASPIRIN 81 MG: 81 TABLET, COATED ORAL at 07:50

## 2021-01-01 RX ADMIN — POTASSIUM CHLORIDE 40 MEQ: 1500 TABLET, EXTENDED RELEASE ORAL at 21:48

## 2021-01-01 RX ADMIN — LEVETIRACETAM 500 MG: 500 TABLET, FILM COATED ORAL at 21:36

## 2021-01-01 RX ADMIN — POTASSIUM CHLORIDE 20 MEQ: 14.9 INJECTION, SOLUTION INTRAVENOUS at 00:58

## 2021-01-01 RX ADMIN — DIBASIC SODIUM PHOSPHATE, MONOBASIC POTASSIUM PHOSPHATE AND MONOBASIC SODIUM PHOSPHATE 2 TABLET: 852; 155; 130 TABLET ORAL at 21:48

## 2021-01-01 RX ADMIN — POTASSIUM CHLORIDE 20 MEQ: 14.9 INJECTION, SOLUTION INTRAVENOUS at 06:41

## 2021-01-01 RX ADMIN — POLYETHYLENE GLYCOL 3350 17 G: 17 POWDER, FOR SOLUTION ORAL at 14:55

## 2021-01-01 RX ADMIN — FENTANYL CITRATE 50 MCG: 50 INJECTION, SOLUTION INTRAMUSCULAR; INTRAVENOUS at 15:10

## 2021-01-01 RX ADMIN — CEPHALEXIN 500 MG: 500 CAPSULE ORAL at 17:22

## 2021-01-01 RX ADMIN — Medication 6 MG: at 22:06

## 2021-01-01 RX ADMIN — DIPHENHYDRAMINE HYDROCHLORIDE 25 MG: 50 INJECTION, SOLUTION INTRAMUSCULAR; INTRAVENOUS at 11:43

## 2021-01-01 RX ADMIN — CIPROFLOXACIN HYDROCHLORIDE 500 MG: 500 TABLET, FILM COATED ORAL at 20:34

## 2021-01-01 RX ADMIN — DOCUSATE SODIUM 100 MG: 100 CAPSULE, LIQUID FILLED ORAL at 08:38

## 2021-01-01 RX ADMIN — FINASTERIDE 5 MG: 5 TABLET, FILM COATED ORAL at 08:40

## 2021-01-01 RX ADMIN — METOPROLOL TARTRATE 25 MG: 25 TABLET, FILM COATED ORAL at 08:39

## 2021-01-01 RX ADMIN — HYDROCHLOROTHIAZIDE 25 MG: 25 TABLET ORAL at 09:36

## 2021-01-01 RX ADMIN — SODIUM CHLORIDE, SODIUM LACTATE, POTASSIUM CHLORIDE, AND CALCIUM CHLORIDE 100 ML/HR: .6; .31; .03; .02 INJECTION, SOLUTION INTRAVENOUS at 09:18

## 2021-01-01 RX ADMIN — LOPERAMIDE HYDROCHLORIDE 2 MG: 2 CAPSULE ORAL at 08:18

## 2021-01-01 RX ADMIN — ACETAMINOPHEN 650 MG: 325 TABLET, FILM COATED ORAL at 05:18

## 2021-01-01 RX ADMIN — MAGNESIUM OXIDE TAB 400 MG (241.3 MG ELEMENTAL MG) 400 MG: 400 (241.3 MG) TAB at 17:41

## 2021-01-01 RX ADMIN — HYDROCHLOROTHIAZIDE 25 MG: 25 TABLET ORAL at 09:17

## 2021-01-01 RX ADMIN — CEFAZOLIN SODIUM 2000 MG: 2 SOLUTION INTRAVENOUS at 08:08

## 2021-01-01 RX ADMIN — CEPHALEXIN 500 MG: 500 CAPSULE ORAL at 05:40

## 2021-01-01 RX ADMIN — SODIUM CHLORIDE 125 ML/HR: 0.9 INJECTION, SOLUTION INTRAVENOUS at 00:03

## 2021-01-01 RX ADMIN — POTASSIUM CHLORIDE 40 MEQ: 1500 TABLET, EXTENDED RELEASE ORAL at 16:30

## 2021-01-01 RX ADMIN — FENTANYL CITRATE 50 MCG: 50 INJECTION INTRAMUSCULAR; INTRAVENOUS at 16:37

## 2021-01-01 RX ADMIN — FENTANYL CITRATE 25 MCG: 50 INJECTION INTRAMUSCULAR; INTRAVENOUS at 12:00

## 2021-01-01 RX ADMIN — POTASSIUM CHLORIDE 20 MEQ: 14.9 INJECTION, SOLUTION INTRAVENOUS at 09:00

## 2021-01-08 NOTE — PRE-PROCEDURE INSTRUCTIONS
My Surgical Experience    The following information was developed to assist you to prepare for your operation  What do I need to do before coming to the hospital?   Arrange for a responsible person to drive you to and from the hospital    Arrange care for your children at home  Children are not allowed in the recovery areas of the hospital   Plan to wear clothing that is easy to put on and take off  If you are having shoulder surgery, wear a shirt that buttons or zippers in the front  Bathing  o Shower the evening before and the morning of your surgery with an antibacterial soap  Please refer to the Pre Op Showering Instructions for Surgery Patients Sheet   o Remove nail polish and all body piercing jewelry  o Do not shave any body part for at least 24 hours before surgery-this includes face, arms, legs and upper body  Food  o Nothing to eat or drink after midnight the night before your surgery  This includes candy and chewing gum  o Exception: If your surgery is after 12:00pm (noon), you may have clear liquids such as 7-Up®, ginger ale, apple or cranberry juice, Jell-O®, water, or clear broth until 8:00 am  o Do not drink milk or juice with pulp on the morning before surgery  o Do not drink alcohol 24 hours before surgery  Medicine  o Follow instructions you received from your surgeon about which medicines you may take on the day of surgery  o If instructed to take medicine on the morning of surgery, take pills with just a small sip of water  Call your prescribing doctor for specific infroamtion on what to do if you take insulin    What should I bring to the hospital?    Bring:  Marrianne Libman or a walker, if you have them, for foot or knee surgery   A list of the daily medicines, vitamins, minerals, herbals and nutritional supplements you take   Include the dosages of medicines and the time you take them each day   Glasses, dentures or hearing aids   Minimal clothing; you will be wearing hospital sleepwear   Photo ID; required to verify your identity   If you have a Living Will or Power of , bring a copy of the documents   If you have an ostomy, bring an extra pouch and any supplies you use    Do not bring   Medicines or inhalers   Money, valuables or jewelry    What other information should I know about the day of surgery?  Notify your surgeons if you develop a cold, sore throat, cough, fever, rash or any other illness   Report to the Ambulatory Surgical/Same Day Surgery Unit   You will be instructed to stop at Registration only if you have not been pre-registered   Inform your  fi they do not stay that they will be asked by the staff to leave a phone number where they can be reached   Be available to be reached before surgery  In the event the operating room schedule changes, you may be asked to come in earlier or later than expected    *It is important to tell your doctor and others involved in your health care if you are taking or have been taking any non-prescription drugs, vitamins, minerals, herbals or other nutritional supplements  Any of these may interact with some food or medicines and cause a reaction      Pre-Surgery Instructions:   Medication Instructions    aspirin (ECOTRIN LOW STRENGTH) 81 mg EC tablet Instructed patient per Anesthesia Guidelines   finasteride (PROSCAR) 5 mg tablet Instructed patient per Anesthesia Guidelines   hydrochlorothiazide (HYDRODIURIL) 25 mg tablet Instructed patient per Anesthesia Guidelines   metoprolol tartrate (LOPRESSOR) 25 mg tablet Instructed patient per Anesthesia Guidelines   rivaroxaban (Xarelto) 10 mg tablet Instructed patient per Anesthesia Guidelines  To take metoprolol a m  of surgery

## 2021-01-08 NOTE — H&P
History & Physical - [facility]  Vibha Salmeron 80 y o  male MRN: [id number]  Unit/Bed#:  Encounter: [id number]    ASSESSMENT/PLAN:    Bladder mass  Cystoscopy 12/28/20 for retention and gross hematuria indicated 2cm bladder mass left trigone/posterior wall  · Transurethral resection of bladder mass  · Consent signed in holding and risks and benefits and plans for restart of thinners discussed  Urinary retention  TURP 2010   Urinary retention with hematuria at [facility] 12/26/20  Valadez removed at time of cystoscopy 12/28/20  Repeat retention 01/06/21 and pt then started to perform self cath  Cystoscopy 12/28/20 indicated mild/moderate prostate obstruction and finasteride started  · Continue with self cath  · May need valadez after surgery   · May need repeat TURP in future depending on voiding status    Gross hematuria  Gross hematuria secondary to bladder mass, prostate enlargement, and anticoagulants  · Monitor for continued hematuria after tumor resection     Covid 19  Covid 19 pandemic  · Covid testing prior to surgery       HISTORY OF PRESENT ILLNESS:  81 y/o male with gross hematuria, urinary retention, BPH, and a bladder mass on cystoscopy currently self cathing due to his retention  Presents for bladder tumor resection       PAST MEDICAL HISTORY:  Past Medical History:   Diagnosis Date    AAA [facility]ab[facility]) [facility] [facility]     Atrial fibrillation ([facility] )     Cancer ([facility] [address] ) 2014    lung ca- right lung  stage 3     Carotid artery disease ([facility] )     Hypertension     PAD (peripheral artery disease) (HCC)     Pneumonia     PVD (peripheral vascular disease) (HCC)        PAST SURGICAL HISTORY:  Past Surgical History:   Procedure Laterality Date    BOWEL RESECTION      COLONOSCOPY      HERNIA REPAIR      X2    IR AORTAGRAM WITH RUN-OFF  5/29/2020    SEVERAL    IR AORTAGRAM WITH RUN-OFF  10/9/2020    IR LOWER EXTREMITY ANGIOGRAM  11/4/2020    AL SLCTV CATHJ 3RD+ ORD SLCTV ABDL PEL/LXTR [facility] Left 10/9/2020    Procedure: ARTERIOGRAM Left lower extremity agram right femoral access with SFA stenting/angioplasty;  Surgeon: Ana Cates MD;  Location: [facility];  Service: Vascular    MT Da Fernandez 3RD+ ORD [address] PEL/LXTR [facility] Right 2020    Procedure: ARTERIOGRAM RLE angiogram L fem access;  Surgeon: Ana Cates MD;  Location: [facility];  Service: Vascular    PROSTATE SURGERY      REMOVAL VENOUS PORT (PORT-A-CATH)         ALLERGIES:  Allergies   Allergen Reactions    Sulfa Antibiotics Shortness Of Breath     Other reaction(s): Other (See Comments)  SOB      Protamine      Other reaction(s): Other (See Comments)  Did not work for patient         SOCIAL HISTORY:  Social History     Substance and Sexual Activity   Alcohol Use Yes    Comment: SOCIAL     Social History     Substance and Sexual Activity   Drug Use Never     Social History     Tobacco Use   Smoking Status Former Smoker    Years: 30 00    Types: Cigarettes    Quit date: 1988    Years since quittin 0   Smokeless Tobacco Never Used       FAMILY HISTORY:  Family History   Problem Relation Age of Onset    Alzheimer's disease Mother     Cancer Father        MEDICATIONS:  No current facility-administered medications for this encounter       Current Outpatient Medications:     aspirin (ECOTRIN LOW STRENGTH) 81 mg EC tablet, Take 1 tablet (81 mg total) by mouth daily (Patient taking differently: Take 81 mg by mouth daily Last dose 21), Disp:  , Rfl:     finasteride (PROSCAR) 5 mg tablet, Take 5 mg by mouth daily, Disp: , Rfl:     hydrochlorothiazide (HYDRODIURIL) 25 mg tablet, Take 25 mg by mouth daily, Disp: , Rfl:     metoprolol tartrate (LOPRESSOR) 25 mg tablet, Take 25 mg by mouth daily , Disp: , Rfl:     rivaroxaban (Xarelto) 10 mg tablet, Take 10 mg by mouth daily at bedtime To check with surgeon when to hold RX  Pt advised to stop after 21, Disp: , Rfl:     atorvastatin (LIPITOR) 40 mg tablet, Take 1 tablet (40 mg total) by mouth daily, Disp: 90 tablet, Rfl: 4    Review of Systems   Genitourinary: Positive for difficulty urinating, frequency and urgency  PHYSICAL EXAM:  Physical Exam  HENT:      Head: Normocephalic  Cardiovascular:      Rate and Rhythm: Normal rate  Pulmonary:      Effort: Pulmonary effort is normal    Abdominal:      General: Abdomen is flat  Palpations: Abdomen is soft  Genitourinary:     Penis: Normal     Skin:     General: Skin is warm  Neurological:      Mental Status: He is alert  Psychiatric:         Mood and Affect: Mood normal          LAB RESULTS:  Lab Results   Component Value Date    WBC 6 37 12/26/2020    HGB 12 3 12/26/2020    HCT 36 4 (L) 12/26/2020    MCV 98 12/26/2020     (L) 12/26/2020     Lab Results   Component Value Date    SODIUM 139 12/26/2020    K 4 1 12/26/2020     12/26/2020    CO2 27 12/26/2020    BUN 24 12/26/2020    CREATININE 1 10 12/26/2020    GLUC 100 12/26/2020    CALCIUM 9 1 12/26/2020     Lab Results   Component Value Date    CALCIUM 9 1 12/26/2020       OTHER STUDIES:  CT abd/pelvis wo CTS 12/26/20  IMPRESSION:     Nonobstructing left-sided intrarenal calculi  No evidence of hydronephrosis      Infrarenal abdominal aortic aneurysm measuring 4 8 cm      Large posterior left urinary bladder diverticulum with distention of the urinary bladder      Tree-in-bud airspace opacities in the left upper lobe which may represent small vessel disease  Short-term follow-up chest CT scan in 3 months is recommended  Garcia Haney PA-C  01/08/21    Portions of the record may have been created with voice recognition software   Occasional wrong word or "sound alike" substitutions may have occurred due to the inherent limitations of voice recognition software   Read the chart carefully and recognize, using context, where substitutions have occurred

## 2021-01-15 PROBLEM — C67.4 MALIGNANT NEOPLASM OF POSTERIOR WALL OF BLADDER (HCC): Status: ACTIVE | Noted: 2021-01-01

## 2021-01-15 NOTE — ANESTHESIA POSTPROCEDURE EVALUATION
Post-Op Assessment Note    CV Status:  Stable  Pain Score: 0    Pain management: adequate     Mental Status:  Sleepy   Hydration Status:  Euvolemic   PONV Controlled:  Controlled   Airway Patency:  Patent      Post Op Vitals Reviewed: Yes      Staff: Anesthesiologist, CRNA         No complications documented      BP      Temp     Pulse     Resp      SpO2

## 2021-01-15 NOTE — ANESTHESIA PREPROCEDURE EVALUATION
Procedure:  CYSTOSCOPY, TRANSURETHRAL RESECTION OF BLADDER TUMOR (TURBT) (N/A Bladder)    Relevant Problems   CARDIO   (+) Abdominal aortic aneurysm (AAA) without rupture (HCC)   (+) Atrial fibrillation (HCC)   (+) Benign essential hypertension   (+) Hemangioma of liver   (+) Mixed hyperlipidemia      GI/HEPATIC   (+) Hemangioma of liver      NEURO/PSYCH   (+) 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)        Physical Exam    Airway    Mallampati score: II  TM Distance: >3 FB  Neck ROM: full     Dental   No notable dental hx     Cardiovascular  Rhythm: regular, Rate: normal,     Pulmonary  Breath sounds clear to auscultation, Decreased breath sounds,     Other Findings        Anesthesia Plan  ASA Score- 4     Anesthesia Type- IV sedation with anesthesia with ASA Monitors  Additional Monitors:   Airway Plan:           Plan Factors-Exercise tolerance (METS): <4 METS  Chart reviewed  EKG reviewed  Existing labs reviewed  Patient summary reviewed  Patient is not a current smoker  Induction- intravenous  Postoperative Plan- Plan for postoperative opioid use  Informed Consent- Anesthetic plan and risks discussed with patient  I personally reviewed this patient with the CRNA  Discussed and agreed on the Anesthesia Plan with the CRNA  Nate Pearson

## 2021-01-15 NOTE — DISCHARGE INSTRUCTIONS
Please call if having difficulty with urination, significant bleeding, or pain  May restart aspirin and Xarelto 1/17/2021 if no blood is visible in the urine  Otherwise hold until the bleeding subsides

## 2021-01-15 NOTE — PERIOPERATIVE NURSING NOTE
Unable to verify chart, no anesthesia consent, no H&P update and patient has no signed procedure consent

## 2021-01-15 NOTE — OP NOTE
OPERATIVE REPORT- Dr Duran Mao  PATIENT NAME: Negrita Dodge    :  1934  MRN: [id number]  Pt Location: [facility] OR ROOM 04    SURGERY DATE: 1/15/2021    Surgeon: Max Wu MD    Pre-op Diagnosis:  1  Bladder mass    Post-op Diagnosis:  1  Bladder masses    Procedure:  1  Transurethral resection of bladder tumor - medium (43691)       Specimen(s):   ID Type Source Tests Collected by Time Destination   1 : RIGHT TRIGONE BLADDER TUMOR Tissue Urinary Bladder TISSUE EXAM Max Wu MD 1/15/2021 0825        Estimated Blood Loss: Minimal    Complications: None    Drains: Luo catheter    Anesthesia type: IV Sedation with Anesthesia    Indications for surgery: Bladder mass of the right trigone    Findings:  The patient had medium-sized tumor the right trigone behind the ureteral orifice  He also had a broad-based smooth appearing 1 cm lesion in the posterior aspect of the left posterior wall bladder diverticulum  This was to large and in a portion of the bladder thinned by diverticulum to be treated effectively without perforation  The patient is a high risk for bleeding and complications related to his aneurysm, coronary artery disease, peripheral arterial disease, and chronic use of anticoagulants  Procedure and Technique:   After obtaining consent and identifying the patient, antibiotics were given as ordered and the patient was brought to the room  All appropriate leads and monitors were placed and the patient was appropriately positioned on the table  Anesthesia was administered and the patient was sterilely prepped and draped  A timeout was performed where the patient name, , procedure, antibiotics, allergies, etc  were discussed  All in the room were satisfied before the start of the operative procedure  What follows are the operative findings and events  Cystoscopy was undertaken with a 22 [employer] scope    Tumor was identified in the left posterior diverticulum and in the right trigone  Biopsy was taken from the right trigone and then fulguration was performed with the Bugbee  It was deemed far too risky to treat the tumor in the diverticulum  This was left untreated  The bladder was irrigated initially to clear before doing anything  Pictures were taken to document good hemostasis at the end of the procedure  The procedure was terminated and the patient was awakened without incident and transferred to the PACU in satisfactory condition  Plan:  1  Outpatient follow-up to discuss pathology results and devise a plan for the lesion in the diverticulum  May just require observation given his comorbidities  Up to the patient and his family to decide  SIGNATURE: Kim Fuller MD  DATE: January 15, 2021  TIME: 8:51 AM    Portions of the record may have been created with voice recognition software  Occasional wrong word or "sound alike" substitutions may have occurred due to the inherent limitations of voice recognition software  Read the chart carefully and recognize, using context, where substitutions have occurred

## 2021-01-25 NOTE — PROGRESS NOTES
Assessment/Plan:    Pt is an 79 yo M w/ HLD, afib, HTN, HLD, venous disease, AAA, carotid stenosis, PAD w/ LLE claudication s/p agram w/ L SFA/pop PTA 5/29/20 ([facility]), with recurrent claudication now s/p agram w/ L CFA PTA and L SFA/pop PTA/stent 10/9/20    Asymptomatic bilateral carotid artery stenosis  -     VAS carotid complete study; Future  -     rosuvastatin (CRESTOR) 20 MG tablet;  Take 1 tablet (20 mg total) by mouth daily  -B carotid bruit on exam  -reviewed DU from 11/19 which showed B ICA <50% stenosis (LV[facility])   -reviewed recent DU which shows R ICA <50% and L ICA >70% w/ elvis 473/103  -this is concerning given both the level of stenosis as well as the rapid increase in the disease process; however, patient remains asymptomatic and only just underwent resection for bladder cancer and continues to have untreated lung cancer; discussed the increased risk of stroke if no surgery is performed as well as the risks of surgery; patient is conflicted with decision regarding treatment for this; we have agreed to reimage with DU in 6 months once his bladder treatment is complete and will discuss again at that time; he is aware of CVA/TIA symptoms and if this was to occur, would recommend more urgent surgical management  -f/u in 6 months after repeat testing    Malignant neoplasm of upper lobe of right lung ([address] )  -followed by onc at [facility] with surveillance imaging; no current evidence of metastatic disease; might be a candidate for targeted treatment but has not agreed to bx or genetic testing  -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  -patient continues high dose vitamin C infusions as his only treatment    Venous insufficiency  -evidence of venous disease and single episode of bleeding from L lateral malleolus  -advised to hold pressure and elevate if further bleeding occurs and to call the office for appt  -prior bleeding site now well healed; if bleeding occurred again, would plan for sclero injection; could not visualize feeding vessel on last visit  -continue compression, elevation for medical management    Atherosclerosis of native artery of both lower extremities with intermittent claudication (HCC)  -     VAS lower limb arterial duplex, complete bilateral; Future  -     rosuvastatin (CRESTOR) 20 MG tablet; Take 1 tablet (20 mg total) by mouth daily  -reviewed pre-procedure ABIs from [facility]: 1 05/0 39  -now s/p LLE angiogram w/ L SFA/pop PTA 5/29/20 [facility]  -claudication resolved x 1 month and now returned; L>R claudication  -reviewed LEADs which shows R: 0 71/62/30 and L: 0 52/-/- with R profunda and distal SFA 50-75% stenoses, L CFA stenosis, L SFA >75% stenosis, AT/PT occlusion  -now s/p LLE angiogram w/ L CFA PTA, L SFA/pop PTA and stents for restenosis; peroneal only runoff 10/9/20 Me  -now s/p RLE angiogram w/ R SFA/pop/profunda PTA; peroneal runoff which collateralizes to DP 11/4/20 Me  -reviewed recent LEADs which show R: 0 95/126/74 and L: 1 03/116/45 w/ stable tibial disease  -BLE claudication completely resolved; can walk 1 5miles  -will continue surveillance on a yearly basis w/ DU    Abdominal aortic aneurysm (AAA) without rupture (HCC)  -     VAS ABDOMINAL AORTA/ILIACS; WITH BRITTANY'S; Future  -reviewed noncon CT from 12/20 which shows 4 8cm AAA (prior PET 4 2cm); aneurysm is no eccentric  -reviewed AOIL also from 12/20 which shows 4 5 x 5 2cm AAA  -will continue to monitor this on a q6mos basis    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 ([facility] )  -underwent TURBT 1/15/21 by Dr Sanjuanita Staley  -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  -has not yet had f/u appt to discuss path/additional treatments if necessary; patient reported appt today but not seen in system    Mixed hyperlipidemia  Permanent atrial fibrillation (HCC)  Medications        -     rosuvastatin (CRESTOR) 20 MG tablet;  Take 1 tablet (20 mg total) by mouth daily  -cont ASA  -also continue Xarelto for afib (on half dose, unclear why)  -patient tried atorvastatin and reports numerous side effects; reluctant but willing to try another statin; Rx sent    Subjective:      Patient ID: Rudy Paige is a 80 y o  male  Patient had a CV and AOIL on 12/22/20 and EMILY on 1/21/21  Pt denies TIA pr CVA symptoms  Pt denies abdominal pain, change in appetite, or back pain  Pt denies claudication or rest pain  Pt denies numbness, tingling, or open wounds  Pt is on ASA 81 mg and Xarelto  Pt is a former smoker  HPI:    Patient returns to discuss recent testing  Patient initially complained of claudication in his L calf with ambulation  He would have to stop and rest every 2-3min while walking  He had an angiogram procedure with Dr Howie Atwood and initially had complete relief of his symptoms  This lasted for ~1 month and then had recurrance of his LLE claudication at about 3min of walking  He then underwent angiogram and LLE claudication is completely resolved  He was then limited by his RLE claudication which occured at [facility]  He continued to have some numbness of his feet which is intermittent  Now s/p RLE angiogram   He notes complete resolution of his claudication symptoms  He was able to walk 1 5miles without issue  He also noted resolution of the right foot numbness  He continues to walk >1 mil a couple times per week  Denies claudication, rest pain, numbness  He has hx of an episode of bleeding from the L lateral malleolus  He had a scab there that he had picked off the day prior  No further episodes of this  The scabbed area is now completely healed  He has some BLE edema  He wears compression sometimes, but not elevating his legs  Patient also has known MARLENA NSCLC which he is choosing to treat with vitamin C injections rather than the recommended staging and targeted treatment options  He says "it's working" for him because his cancer hasn't metastasized  He also takes many supplements    He is being followed at [address] [place] [address] 321 by heme/onc but continues to get vitamin C injections from 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  Patient not aware of results currently  He is taking Xarelto for afib but only 10mg; He also takes ASA  Also taking multiple supplements, some of which he reports have anticoagulant properties  He tried statin but thought this caused upset stomach and appetite suppression and reflux  Hesitant about trying a second statin medication  Denies amaurosis, facial droop, garbled, unilateral weakness/numbness  Denies hx of stroke  Prior remote smoker  The following portions of the patient's history were reviewed and updated as appropriate: allergies, current medications, past family history, past medical history, past social history, past surgical history and problem list     Review of Systems   Constitutional: Negative  HENT: Negative  Eyes: Negative  Negative for visual disturbance  Respiratory: Negative  Negative for shortness of breath  Cardiovascular: Positive for leg swelling  Negative for chest pain  Gastrointestinal: Negative for abdominal distention, abdominal pain, constipation, nausea and vomiting  Endocrine: Negative  Genitourinary: Positive for difficulty urinating and hematuria  Musculoskeletal: Negative for arthralgias, gait problem and neck pain  Skin: Positive for color change (B legs)  Negative for wound  Allergic/Immunologic: Negative  Neurological: Negative for facial asymmetry, speech difficulty, weakness and numbness  Hematological: Negative  Psychiatric/Behavioral: Negative  Objective:      /90 (BP Location: Left arm, Patient Position: Sitting)   Pulse 62   Resp 18   Ht 5' 9" (1 753 m)   Wt 74 8 kg (165 lb)   BMI 24 37 kg/m²          Physical Exam  Vitals signs and nursing note reviewed  Constitutional:       Appearance: He is well-developed  HENT:      Head: Normocephalic and atraumatic  Eyes:      Conjunctiva/sclera: Conjunctivae normal    Neck:      Musculoskeletal: Normal range of motion and neck supple  Cardiovascular:      Rate and Rhythm: Normal rate and regular rhythm  Pulses:           Carotid pulses are on the left side with bruit  Radial pulses are 2+ on the right side and 2+ on the left side  Femoral pulses are 2+ on the right side and 2+ on the left side  Popliteal pulses are 0 on the right side and 1+ on the left side  Dorsalis pedis pulses are 2+ on the right side and 0 on the left side  Posterior tibial pulses are 0 on the right side and 0 on the left side  Heart sounds: Normal heart sounds  No murmur  Comments: No R Carotid bruit  Pulmonary:      Effort: Pulmonary effort is normal  No respiratory distress  Breath sounds: Normal breath sounds  No wheezing or rales  Abdominal:      General: There is no distension  Palpations: Abdomen is soft  There is pulsatile mass (no tenderness)  Tenderness: There is no abdominal tenderness  There is no rebound  Musculoskeletal: Normal range of motion  Right lower le+ Edema present  Left lower le+ Edema present  Skin:     General: Skin is warm and dry  Comments: B feet without wounds  Diffuse spiders and retics; dense spiders to B feet L>R;    Neurological:      Mental Status: He is alert and oriented to person, place, and time     Psychiatric:         Behavior: Behavior normal            I have reviewed and made appropriate changes to the review of systems input by the medical assistant  Vitals:    01/25/21 1114   BP: 134/90   BP Location: Left arm   Patient Position: Sitting   Pulse: 62   Resp: 18   Weight: 74 8 kg (165 lb)   Height: 5' 9" (1 753 m)       Patient Active Problem List   Diagnosis    Abdominal aortic aneurysm (AAA) without rupture (HCC)    Atheroscler native arteries the extremities w/intermit claudication ([address] )    Malignant neoplasm of upper lobe of right lung (HCC)    Atrial fibrillation (HCC)    Benign essential hypertension    Hemangioma of liver    Mixed hyperlipidemia    Venous insufficiency    Asymptomatic bilateral carotid artery stenosis    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 ([address] )       Past Surgical History:   Procedure Laterality Date    BOWEL RESECTION      COLONOSCOPY      HERNIA REPAIR      X2    IR AORTAGRAM WITH RUN-OFF  5/29/2020    SEVERAL    IR AORTAGRAM WITH RUN-OFF  10/9/2020    IR LOWER EXTREMITY ANGIOGRAM  11/4/2020    NM CYSTOURETHROSCOPY,FULGUR 2-5 CM LESN N/A 1/15/2021    Procedure: CYSTOSCOPY, TRANSURETHRAL RESECTION OF BLADDER TUMOR (TURBT);   Surgeon: Niya Richey MD;  Location: [address] [place] [address];  Service: Urology    NM [facility] 3RD+ ORD SLCTV ABDL PEL/[facility] Left 10/9/2020    Procedure: ARTERIOGRAM Left lower extremity agram right femoral access with SFA stenting/angioplasty;  Surgeon: Saul Simmons MD;  Location: [facility];  Service: Vascular    NM [facility] 3RD+ ORD SLCTV ABDL PEL/[facility] Right 11/4/2020    Procedure: ARTERIOGRAM RLE angiogram L fem access;  Surgeon: Kelvin Krishnan MD;  Location: [facility];  Service: Vascular    PROSTATE SURGERY      REMOVAL VENOUS PORT (PORT-A-CATH)         Family History   Problem Relation Age of Onset    Alzheimer's disease Mother     Cancer Father        Social History     Socioeconomic History    Marital status: Single     Spouse name: Not on file    Number of children: Not on file    Years of education: Not on file    Highest education level: Not on file   Occupational History    Not on file   Social Needs    Financial resource strain: Not on file    Food insecurity     Worry: Not on file     Inability: Not on file    Transportation needs     Medical: Not on file     Non-medical: Not on file   Tobacco Use    Smoking status: Former Smoker     Years: 30 00     Types: Cigarettes     Quit date: 1988     Years since quittin 0    Smokeless tobacco: Never Used   Substance and Sexual Activity    Alcohol use: Yes     Comment: SOCIAL    Drug use: Never    Sexual activity: Never   Lifestyle    Physical activity     Days per week: Not on file     Minutes per session: Not on file    Stress: Not on file   Relationships    Social connections     Talks on phone: Not on file     Gets together: Not on file     Attends Protestant service: Not on file     Active member of club or organization: Not on file     Attends meetings of clubs or organizations: Not on file     Relationship status: Not on file    Intimate partner violence     Fear of current or ex partner: Not on file     Emotionally abused: Not on file     Physically abused: Not on file     Forced sexual activity: Not on file   Other Topics Concern    Not on file   Social History Narrative    Not on file       Allergies   Allergen Reactions    Sulfa Antibiotics Shortness Of Breath     Other reaction(s): Other (See Comments)  SOB      Protamine      Other reaction(s):  Other (See Comments)  Did not work for patient           Current Outpatient Medications:     aspirin (ECOTRIN LOW STRENGTH) 81 mg EC tablet, Take 1 tablet (81 mg total) by mouth daily (Patient taking differently: Take 81 mg by mouth daily Last dose 21), Disp:  , Rfl:     finasteride (PROSCAR) 5 mg tablet, Take 5 mg by mouth daily, Disp: , Rfl:     hydrochlorothiazide (HYDRODIURIL) 25 mg tablet, Take 25 mg by mouth daily, Disp: , Rfl:     metoprolol tartrate (LOPRESSOR) 25 mg tablet, Take 25 mg by mouth daily , Disp: , Rfl:    rivaroxaban (Xarelto) 10 mg tablet, Take 10 mg by mouth daily at bedtime To check with surgeon when to hold RX  Pt advised to stop after 1/11/21, Disp: , Rfl:     atorvastatin (LIPITOR) 40 mg tablet, Take 1 tablet (40 mg total) by mouth daily (Patient not taking: Reported on 1/25/2021), Disp: 90 tablet, Rfl: 4

## 2021-01-25 NOTE — PATIENT INSTRUCTIONS
1) PAD  -you had a successful procedure to treat the blockages in the left and right legs  -your ultrasound shows excellent blood flow and you are walking without symptoms  -we will monitor this on a yearly basis    2) AAA  -you have an abdominal aortic aneurysm which is 4 8cm in size  -we will monitor this with ultrasound on a 6month basis  -once it reaches 5 5cm in size, we will discuss repair  -if you have sudden onset of severe abdominal and back pain, please come to the hospital for evaluation    3) Carotid stenosis  -your ultrasound showed a severe blockage in the artery on the left  -we discussed possible surgery for this to prevent strokes  -given the risks of surgery and your other medical issues, you have elected not to have surgery at this time  -we will monitor this with ultrasound and have another discussion about this in 6 months    4) Venous disease  -if you have any more episodes of bleeding from your veins, put direct pressure on it with your finger and elevate you leg in the air as high as you can  -call my office once the bleeding stops  -if this happens again, we will plan to inject it with a solution to prevent more bleeding  -wearing compression socks with help with the swelling in your legs (gradient compression, at least 15-20mmHg); you need to wear these daily to be affective; elevating your legs will also help    5) Medications  -please continue taking your Xarelto  -please start taking aspirin 81mg once daily  -we are going to start a different statin medication to try to find one that will have fewer side effects for you

## 2021-01-30 NOTE — TELEPHONE ENCOUNTER
Pt said there was an issue with being able to get his medication  He described what seemed like a preauthorization issue for the drug with the ID# [id number] (given by the pharmacy)  The pharmacy is requesting that [facility] Doctor call [phone] to approve for this medication to be sent to the patient

## 2021-02-03 NOTE — TELEPHONE ENCOUNTER
I called [facility]  At [phone],  Patient has [address] for prescription coverage and his number is [phone]  I started the pre auth process for his crestor and [facility] said it will take 24 to 48 hours for clinical review  I told patient this

## 2021-02-05 NOTE — TELEPHONE ENCOUNTER
I called [facility] and crestor was denied  The diagnoses provided is not a medically accepted indication (PANCHO, athero w/ claudication, AAA)  I will email Dr Castro Wu to notify of this and see if she would like to appeal or order other medication  Pt was notified of updated status

## 2021-02-09 NOTE — TELEPHONE ENCOUNTER
Emailed Dr Mahogany Juan again re: denial   Pt also called stating he received letter  Advised once we hear from Dr Mahogany Juan re: her plan we will notify him

## 2021-02-16 NOTE — TELEPHONE ENCOUNTER
Dr Dima Pickett resubmitted medication w/ dx of hyperlipidemia to [facility], pt notified of same, he states he receives notification from pharmacy when medication is approved  Advised to contact us if any further issue

## 2021-02-25 NOTE — H&P
History & Physical - [facility]  Flor Betancur 80 y o  male MRN: [id number]  Unit/Bed#:  Encounter: [id number]    ASSESSMENT/PLAN:    BPH  BPH with obstruction  Hx of urinary retention and need for CIC  Finasteride 5mg started 12/28/20  Continued hematuria, UTI's, and need for self cath  · BIpolar TURP  Cleared from cardiology Dr Sherry Day    UTI  Recurrent UTIs  Urine culture 02/22/21 Morganella Morganii sensitive to cipro  Urine culture 02/08/21 Enterococcus  Urine culture 01/25/21 Providencia   · Cipro 500mg IV at time of surgery     Covid 19  Covid 19 Pandemic  · Covid testing prior to surgery       HISTORY OF PRESENT ILLNESS:  81 y/o male with obstructive BPH, hematuria, urinary retention, and recurrent UTIs  Pt needs to self cath intermittently due to his retention  Pt desires surgical intervention of his prostate obstruction due to his continued bleeding, infections, and need for self cath  PAST MEDICAL HISTORY:  Past Medical History:   Diagnosis Date    AAA (abdominal aortic aneurysm) (HCC)     [facility] )     Cancer ([facility] [facility] ) 2014    lung ca- right lung  stage 3     Carotid artery disease (HCC)     Hyperlipidemia     Hypertension     PAD (peripheral artery disease) (HCC)     Pneumonia     PVD (peripheral vascular disease) (HCC)        PAST SURGICAL HISTORY:  Past Surgical History:   Procedure Laterality Date    BOWEL RESECTION      COLONOSCOPY      HERNIA REPAIR      X2    IR AORTAGRAM WITH RUN-OFF  5/29/2020    SEVERAL    IR AORTAGRAM WITH RUN-OFF  10/9/2020    IR LOWER EXTREMITY ANGIOGRAM  11/4/2020    CA CYSTOURETHROSCOPY,FULGUR 2-5 CM LESN N/A 1/15/2021    Procedure: CYSTOSCOPY, TRANSURETHRAL RESECTION OF BLADDER TUMOR (TURBT);   Surgeon: Patria Márquez MD;  Location: [address] [place] [address];  Service: Urology    CA Connie Bailon 3RD+ ORD SLCTV ABDL PEL/LXTR [facility] Left 10/9/2020    Procedure: ARTERIOGRAM Left lower extremity agram right femoral access with SFA stenting/angioplasty;  Surgeon: Osman Nolan MD;  [facility];  Service: Vascular    [place] Segundo Velasquez 3RD+ ORD [address] PEL/LXTR [facility] Right 2020    Procedure: ARTERIOGRAM RLE angiogram L fem access;  Surgeon: Osman Nolan MD;  [facility]: [facility];  Service: Vascular    PROSTATE SURGERY      REMOVAL VENOUS PORT (PORT-A-CATH)         ALLERGIES:  Allergies   Allergen Reactions    Sulfa Antibiotics Shortness Of Breath     Other reaction(s): Other (See Comments)  SOB      Protamine      Other reaction(s): Other (See Comments)  Did not work for patient         SOCIAL HISTORY:  Social History     Substance and Sexual Activity   Alcohol Use Yes    Comment: SOCIAL     Social History     Substance and Sexual Activity   Drug Use Never     Social History     Tobacco Use   Smoking Status Former Smoker    Years: 30 00    Types: Cigarettes    Quit date: 1988    Years since quittin 1   Smokeless Tobacco Never Used       FAMILY HISTORY:  Family History   Problem Relation Age of Onset    Alzheimer's disease Mother     Cancer Father        MEDICATIONS:  No current facility-administered medications for this encounter       Current Outpatient Medications:     aspirin (ECOTRIN LOW STRENGTH) 81 mg EC tablet, Take 1 tablet (81 mg total) by mouth daily (Patient taking differently: Take 81 mg by mouth daily Last dose 21), Disp:  , Rfl:     finasteride (PROSCAR) 5 mg tablet, Take 5 mg by mouth daily, Disp: , Rfl:     hydrochlorothiazide (HYDRODIURIL) 25 mg tablet, Take 25 mg by mouth daily, Disp: , Rfl:     metoprolol tartrate (LOPRESSOR) 25 mg tablet, Take 25 mg by mouth daily , Disp: , Rfl:     rivaroxaban (Xarelto) 10 mg tablet, Take 10 mg by mouth daily at bedtime To check with surgeon when to hold RX  Pt advised to stop after 21, Disp: , Rfl:     rosuvastatin (CRESTOR) 20 MG tablet, Take 1 tablet (20 mg total) by mouth daily, Disp: 90 each, Rfl: 4    Review of Systems    PHYSICAL EXAM:  Physical Exam  Constitutional:       Appearance: Normal appearance  HENT:      Head: Normocephalic  Cardiovascular:      Rate and Rhythm: Normal rate  Pulmonary:      Effort: Pulmonary effort is normal    Abdominal:      General: Abdomen is flat  Palpations: Abdomen is soft  There is no mass  Tenderness: There is no guarding or rebound  Genitourinary:     Penis: Normal        Scrotum/Testes: Normal    Skin:     General: Skin is warm and dry  Neurological:      General: No focal deficit present  Mental Status: He is alert and oriented to person, place, and time  Psychiatric:         Mood and Affect: Mood normal          Behavior: Behavior normal          LAB RESULTS:  Lab Results   Component Value Date    WBC 6 37 12/26/2020    HGB 12 3 12/26/2020    HCT 36 4 (L) 12/26/2020    MCV 98 12/26/2020     (L) 12/26/2020     Lab Results   Component Value Date    SODIUM 139 12/26/2020    K 4 1 12/26/2020     12/26/2020    CO2 27 12/26/2020    BUN 24 12/26/2020    CREATININE 1 10 12/26/2020    GLUC 100 12/26/2020    CALCIUM 9 1 12/26/2020     Lab Results   Component Value Date    CALCIUM 9 1 12/26/2020         Reynold Banks PA-C  02/25/21    Portions of the record may have been created with voice recognition software   Occasional wrong word or "sound alike" substitutions may have occurred due to the inherent limitations of voice recognition software   Read the chart carefully and recognize, using context, where substitutions have occurred

## 2021-03-02 PROBLEM — N40.1 BENIGN PROSTATIC HYPERPLASIA WITH URINARY OBSTRUCTION: Status: ACTIVE | Noted: 2021-01-01

## 2021-03-02 PROBLEM — N32.3 BLADDER DIVERTICULUM: Status: ACTIVE | Noted: 2021-01-01

## 2021-03-02 PROBLEM — R31.0 GROSS HEMATURIA: Status: ACTIVE | Noted: 2021-01-01

## 2021-03-02 PROBLEM — N13.8 BENIGN PROSTATIC HYPERPLASIA WITH URINARY OBSTRUCTION: Status: ACTIVE | Noted: 2021-01-01

## 2021-03-02 NOTE — PLAN OF CARE
Problem: GENITOURINARY - ADULT  Goal: Maintains or returns to baseline urinary function  Description: INTERVENTIONS:  - Assess urinary function  - Encourage oral fluids to ensure adequate hydration if ordered  - Administer IV fluids as ordered to ensure adequate hydration  - Administer ordered medications as needed  - Offer frequent toileting  - Follow urinary retention protocol if ordered  Outcome: Progressing  Goal: Absence of urinary retention  Description: INTERVENTIONS:  - Assess patients ability to void and empty bladder  - Monitor I/O  - Bladder scan as needed  - Discuss with physician/AP medications to alleviate retention as needed  - Discuss catheterization for long term situations as appropriate  Outcome: Progressing  Goal: Urinary catheter remains patent  Description: INTERVENTIONS:  - Assess patency of urinary catheter  - If patient has a chronic valadez, consider changing catheter if non-functioning  - Follow guidelines for intermittent irrigation of non-functioning urinary catheter  Outcome: Progressing     Problem: SKIN/TISSUE INTEGRITY - ADULT  Goal: Skin integrity remains intact  Description: INTERVENTIONS  - Identify patients at risk for skin breakdown  - Assess and monitor skin integrity  - Assess and monitor nutrition and hydration status  - Monitor labs (i e  albumin)  - Assess for incontinence   - Turn and reposition patient  - Assist with mobility/ambulation  - Relieve pressure over bony prominences  - Avoid friction and shearing  - Provide appropriate hygiene as needed including keeping skin clean and dry  - Evaluate need for skin moisturizer/barrier cream  - Collaborate with interdisciplinary team (i e  Nutrition, Rehabilitation, etc )   - Patient/family teaching  Outcome: Progressing  Goal: Incision(s), wounds(s) or drain site(s) healing without S/S of infection  Description: INTERVENTIONS  - Assess and document risk factors for skin impairment   - Assess and document dressing, incision, wound bed, drain sites and surrounding tissue  - Consider nutrition services referral as needed  - Oral mucous membranes remain intact  - Provide patient/ family education  Outcome: Progressing  Goal: Oral mucous membranes remain intact  Description: INTERVENTIONS  - Assess oral mucosa and hygiene practices  - Implement preventative oral hygiene regimen  - Implement oral medicated treatments as ordered  - Initiate Nutrition services referral as needed  Outcome: Progressing     Problem: HEMATOLOGIC - ADULT  Goal: Maintains hematologic stability  Description: INTERVENTIONS  - Assess for signs and symptoms of bleeding or hemorrhage  - Monitor labs  - Administer supportive blood products/factors as ordered and appropriate  Outcome: Progressing     Problem: PAIN - ADULT  Goal: Verbalizes/displays adequate comfort level or baseline comfort level  Description: Interventions:  - Encourage patient to monitor pain and request assistance  - Assess pain using appropriate pain scale  - Administer analgesics based on type and severity of pain and evaluate response  - Implement non-pharmacological measures as appropriate and evaluate response  - Consider cultural and social influences on pain and pain management  - Notify physician/advanced practitioner if interventions unsuccessful or patient reports new pain  Outcome: Progressing     Problem: INFECTION - ADULT  Goal: Absence or prevention of progression during hospitalization  Description: INTERVENTIONS:  - Assess and monitor for signs and symptoms of infection  - Monitor lab/diagnostic results  - Monitor all insertion sites, i e  indwelling lines, tubes, and drains  - Monitor endotracheal if appropriate and nasal secretions for changes in amount and color  - Callender appropriate cooling/warming therapies per order  - Administer medications as ordered  - Instruct and encourage patient and family to use good hand hygiene technique  - Identify and instruct in appropriate isolation precautions for identified infection/condition  Outcome: Progressing  Goal: Absence of fever/infection during neutropenic period  Description: INTERVENTIONS:  - Monitor WBC    Outcome: Progressing

## 2021-03-02 NOTE — OP NOTE
OPERATIVE REPORT- Dr Ken Gomez  PATIENT NAME: Aliya Thompson    :  1934  MRN: [id number]  Pt Location: [facility] OR ROOM 04    SURGERY DATE: 3/2/2021    Surgeon: Sunil Ribeiro MD    Pre-op Diagnosis:  1  BPH with obstruction  2  Hx TCCA  3  Bladder diverticulum  4  Gross hematuria  5  Chronic use of blood thinners  6  Urine retention requiring self catheterization  7  Chronic urinary infection    Post-op Diagnosis:  1  Same    Procedure:  1  Transurethral resection of prostate    Specimen(s):   ID Type Source Tests Collected by Time Destination   1 : prostate tissue Tissue Prostate TISSUE EXAM Sunil Ribeiro MD 3/2/2021 1344        Estimated Blood Loss: Minimal    Complications: None    Drains:  1  22 Fr 3 way valadez with continuous irrigation of normal saline    Anesthesia type:   General    Indications for surgery:  1  Urinary retention with need for self catheterization  Recent transurethral resection of bladder tumors for malignancy  Urinary infections and gross hematuria  Patient desiring surgery to avoid self catheterization for the rest of his life possibly  Findings:  1  Mild to moderate prostate obstruction  No evidence of bladder tumors or stones  Large left posterior wall bladder diverticulum  Procedure and Technique:   After obtaining consent and identifying the patient, antibiotics were given as ordered and the patient was brought to the room  All appropriate leads and monitors were placed and the patient was appropriately positioned on the table  Anesthesia was administered and the patient was sterilely prepped and draped  A timeout was performed where the patient name, , procedure, antibiotics, allergies, etc were discussed  All in the room were satisfied before the start of the operative procedure  What follows are the operative findings and events  The 32 Ukrainian resectoscope sheath with continuous flow was introduced with the direct vision obturator   Continuous flow of glycine was used during the procedure  Inspection of the bladder revealed no significant findings  Resection was initiated at the  11:00 working counterclockwise to 6:00  The floor of the prostate was resected over till about 04:00 o'clock from the bladder neck out to the veru  Finally the anterior aspect of the prostate was resected from the bladder neck carefully out towards the external sphincter, frequently checking to make sure that I was proximal to the sphincter  Again hemostasis was obtained with electrocautery  Prostate chips were irrigated free from the bladder  Repeat evaluation of hemostasis was performed and electrocautery was then used for final hemostasis  The bladder was reinspected with no significant findings noted  The ureteral orifices were visualized and uninjured during the procedure  No chips remained in the bladder  Therefore the bladder was filled and the resectoscope was withdrawn  Immediately a 25 Romanian three-way catheter was placed, the balloon inflated and hand irrigation performed to confirm position and ease of irrigation  Once this was completed a continuous irrigation of normal saline was established  He was awakened from anesthesia having tolerated the procedure well, and transferred to the recovery room in satisfactory condition  Plan:  He will be observed overnight and a voiding trial will be instituted if the urine is clear tomorrow  SIGNATURE: Nahed Samson MD  DATE: March 2, 2021  TIME: 2:14 PM    Portions of the record may have been created with voice recognition software   Occasional wrong word or "sound alike" substitutions may have occurred due to the inherent limitations of voice recognition software   Read the chart carefully and recognize, using context, where substitutions have occurred

## 2021-03-02 NOTE — ANESTHESIA PREPROCEDURE EVALUATION
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)        Physical Exam    Airway    Mallampati score: II  TM Distance: >3 FB  Neck ROM: limited     Dental   Comment: No loose,     Cardiovascular  Rhythm: regular, Rate: normal,     Pulmonary  Breath sounds clear to auscultation,     Other Findings        Anesthesia Plan  ASA Score- 3     Anesthesia Type- general with ASA Monitors  Additional Monitors:   Airway Plan: LMA  Plan Factors-    Chart reviewed  EKG reviewed  Existing labs reviewed  Patient summary reviewed  Patient is not a current smoker  Induction- intravenous  Postoperative Plan- Plan for postoperative opioid use  Planned trial extubation    Informed Consent- Anesthetic plan and risks discussed with patient  I personally reviewed this patient with the CRNA  Discussed and agreed on the Anesthesia Plan with the CRNA  Steve White

## 2021-03-02 NOTE — ANESTHESIA POSTPROCEDURE EVALUATION
Post-Op Assessment Note    CV Status:  Stable  Pain Score: 0    Pain management: adequate     Mental Status:  Sleepy   Hydration Status:  Stable and euvolemic   PONV Controlled:  None   Airway Patency:  Patent       Staff: CRNA         No complications documented      BP  147/65   Temp 36 0C   Pulse 77   Resp 20   SpO2  99%6lfm

## 2021-03-03 NOTE — DISCHARGE SUMMARY
Discharge Summary - Rosa Isela Castaneda 80 y o  male MRN: [id number]    Unit/Bed#: [address] [facility] Encounter: [id number]    Admission Date: 3/2/2021     Discharge Date: 3/4/21    Attending:Fausto Lopes MD     Consultants: none    Admitting Diagnosis: Acute cystitis with hematuria [N30 01]  Benign prostatic hyperplasia with lower urinary tract symptoms [N40 1]  Other obstructive and reflux uropathy [N13 8]    Principle/ Secondary Diagnosis:  Past Medical History:   Diagnosis Date    AAA [facility]) ([facility] )     Atrial fibrillation ([facility] )     Cancer ([facility] ) 2014    lung ca- right lung  stage 3     Carotid artery disease ([place] [address] )     Hyperlipidemia     Hypertension     PAD (peripheral artery disease) ([place] [address] )     Pneumonia     PVD (peripheral vascular disease) ([place] [address] )      Past Surgical History:   Procedure Laterality Date    BOWEL RESECTION      COLONOSCOPY      HERNIA REPAIR      X2    IR AORTAGRAM WITH RUN-OFF  5/29/2020    SEVERAL    IR AORTAGRAM WITH RUN-OFF  10/9/2020    IR LOWER EXTREMITY ANGIOGRAM  11/4/2020    TN CYSTOURETHROSCOPY,FULGUR 2-5 CM LESN N/A 1/15/2021    Procedure: CYSTOSCOPY, TRANSURETHRAL RESECTION OF BLADDER TUMOR (TURBT);   Surgeon: Carlos Fraire MD;  Location: [address] [place] [address];  Service: Urology    [place] [address] [facility] 3RD+ ORD SLCTV ABDL PEL/[facility] Left 10/9/2020    Procedure: ARTERIOGRAM Left lower extremity agram right femoral access with SFA stenting/angioplasty;  Surgeon: Mahogany Simmons MD;  [facility];  Service: Vascular    [place] [address] [facility] 3RD+ ORD SLCTV ABDL PEL/[facility] Right 11/4/2020    Procedure: ARTERIOGRAM RLE angiogram L fem access;  Surgeon: Nia Pierre MD;  [facility];  Service: Vascular    TN TRANSURETHRAL ELEC-SURG PROSTATECTOM N/A 3/2/2021    Procedure: CYSTOSCOPY, TRANSURETHRAL RESECTION OF [place];  Surgeon: Carlos Fraire MD;  Location: [address] [place] [address];  Service: Urology    PROSTATE SURGERY      REMOVAL VENOUS PORT (PORT-A-CATH) Procedures Performed: No orders of the defined types were placed in this encounter  DC TRANSURETHRAL ELEC-SURG PROSTATECTOM [47951] (CYSTOSCOPY, TRANSURETHRAL RESECTION OF PROSTATE, BIPOLAR)  CYSTOSCOPY, TRANSURETHRAL RESECTION OF PROSTATE, BIPOLAR (N/A Abdomen)  Procedure(s):  CYSTOSCOPY, TRANSURETHRAL RESECTION OF PROSTATE, BIPOLAR    Laboratory data at discharge:   Recent Results (from the past 36 hour(s))   Basic Metabolic Panel    Collection Time: 03/03/21  7:40 AM   Result Value Ref Range    Sodium 136 136 - 145 mmol/L    Potassium 4 0 3 5 - 5 3 mmol/L    Chloride 103 100 - 108 mmol/L    CO2 23 21 - 32 mmol/L    ANION GAP 10 4 - 13 mmol/L    BUN 23 5 - 25 mg/dL    Creatinine 0 83 0 60 - 1 30 mg/dL    Glucose 97 65 - 140 mg/dL    Glucose, Fasting 97 65 - 99 mg/dL    Calcium 8 6 8 3 - 10 1 mg/dL    eGFR 80 ml/min/1 73sq m   CBC and Platelet    Collection Time: 03/03/21  7:40 AM   Result Value Ref Range    WBC 6 44 4 31 - 10 16 Thousand/uL    RBC 3 21 (L) 3 88 - 5 62 Million/uL    Hemoglobin 10 3 (L) 12 0 - 17 0 g/dL    Hematocrit 31 4 (L) 36 5 - 49 3 %    MCV 98 82 - 98 fL    MCH 32 1 26 8 - 34 3 pg    MCHC 32 8 31 4 - 37 4 g/dL    RDW 13 8 11 6 - 15 1 %    Platelets 212 (L) 915 - 390 Thousands/uL    MPV 10 1 8 9 - 12 7 fL        Results from last 7 days   Lab Units 03/03/21  0740   WBC Thousand/uL 6 44   HEMOGLOBIN g/dL 10 3*   HEMATOCRIT % 31 4*   PLATELETS Thousands/uL 113*     Results from last 7 days   Lab Units 03/03/21  0740   POTASSIUM mmol/L 4 0   CHLORIDE mmol/L 103   CO2 mmol/L 23   BUN mg/dL 23   CREATININE mg/dL 0 83   CALCIUM mg/dL 8 6             Discharge instructions/Information to patient and family:   See after visit summary for information provided to patient and family  Discharge Medications:  See after visit summary for reconciled discharge medications provided to patient and family  Hospital Course: TURP for retention 3/2/21    Luo out with light pink urine noon 3/3/21 voided well without clots  Residual 300 mL  Awaiting discharge for residual to be under 250mL  Residual still 300-400 likely based upon bladder tic and not perfect bladder function  Urine clear yellow and he is happy with stream so discharging home  Condition at Discharge: Good     Provisions for Follow-Up Care: See me to check urine in 1 week  See after visit summary for information related to follow-up care and any pertinent home health orders  Disposition: Home    Planned Readmission: No    Discharge Statement   I spent 20 minutes discharging the patient  This time was spent on the day of discharge  I had direct contact with the patient on the day of discharge  Additional documentation is required if more than 30 minutes were spent on discharge  Portions of the record may have been created with voice recognition software   Occasional wrong word or "sound alike" substitutions may have occurred due to the inherent limitations of voice recognition software   Read the chart carefully and recognize, using context, where substitutions have occurred

## 2021-03-03 NOTE — PLAN OF CARE
Problem: GENITOURINARY - ADULT  Goal: Maintains or returns to baseline urinary function  Description: INTERVENTIONS:  - Assess urinary function  - Encourage oral fluids to ensure adequate hydration if ordered  - Administer IV fluids as ordered to ensure adequate hydration  - Administer ordered medications as needed  - Offer frequent toileting  - Follow urinary retention protocol if ordered  Outcome: Progressing  Goal: Absence of urinary retention  Description: INTERVENTIONS:  - Assess patients ability to void and empty bladder  - Monitor I/O  - Bladder scan as needed  - Discuss with physician/AP medications to alleviate retention as needed  - Discuss catheterization for long term situations as appropriate  Outcome: Progressing  Goal: Urinary catheter remains patent  Description: INTERVENTIONS:  - Assess patency of urinary catheter  - If patient has a chronic valadez, consider changing catheter if non-functioning  - Follow guidelines for intermittent irrigation of non-functioning urinary catheter  Outcome: Progressing     Problem: SKIN/TISSUE INTEGRITY - ADULT  Goal: Skin integrity remains intact  Description: INTERVENTIONS  - Identify patients at risk for skin breakdown  - Assess and monitor skin integrity  - Assess and monitor nutrition and hydration status  - Monitor labs (i e  albumin)  - Assess for incontinence   - Turn and reposition patient  - Assist with mobility/ambulation  - Relieve pressure over bony prominences  - Avoid friction and shearing  - Provide appropriate hygiene as needed including keeping skin clean and dry  - Evaluate need for skin moisturizer/barrier cream  - Collaborate with interdisciplinary team (i e  Nutrition, Rehabilitation, etc )   - Patient/family teaching  Outcome: Progressing  Goal: Incision(s), wounds(s) or drain site(s) healing without S/S of infection  Description: INTERVENTIONS  - Assess and document risk factors for skin impairment   - Assess and document dressing, incision, wound bed, drain sites and surrounding tissue  - Consider nutrition services referral as needed  - Oral mucous membranes remain intact  - Provide patient/ family education  Outcome: Progressing  Goal: Oral mucous membranes remain intact  Description: INTERVENTIONS  - Assess oral mucosa and hygiene practices  - Implement preventative oral hygiene regimen  - Implement oral medicated treatments as ordered  - Initiate Nutrition services referral as needed  Outcome: Progressing     Problem: HEMATOLOGIC - ADULT  Goal: Maintains hematologic stability  Description: INTERVENTIONS  - Assess for signs and symptoms of bleeding or hemorrhage  - Monitor labs  - Administer supportive blood products/factors as ordered and appropriate  Outcome: Progressing     Problem: PAIN - ADULT  Goal: Verbalizes/displays adequate comfort level or baseline comfort level  Description: Interventions:  - Encourage patient to monitor pain and request assistance  - Assess pain using appropriate pain scale  - Administer analgesics based on type and severity of pain and evaluate response  - Implement non-pharmacological measures as appropriate and evaluate response  - Consider cultural and social influences on pain and pain management  - Notify physician/advanced practitioner if interventions unsuccessful or patient reports new pain  Outcome: Progressing     Problem: INFECTION - ADULT  Goal: Absence or prevention of progression during hospitalization  Description: INTERVENTIONS:  - Assess and monitor for signs and symptoms of infection  - Monitor lab/diagnostic results  - Monitor all insertion sites, i e  indwelling lines, tubes, and drains  - Monitor endotracheal if appropriate and nasal secretions for changes in amount and color  - [place] appropriate cooling/warming therapies per order  - Administer medications as ordered  - Instruct and encourage patient and family to use good hand hygiene technique  - Identify and instruct in appropriate isolation precautions for identified infection/condition  Outcome: Progressing  Goal: Absence of fever/infection during neutropenic period  Description: INTERVENTIONS:  - Monitor WBC    Outcome: Progressing

## 2021-03-03 NOTE — DISCHARGE INSTR - AVS FIRST PAGE
Call for fevers chills cant void or too much blood in urine  Do not try self cath just yet as there is raw areas in prostate

## 2021-03-03 NOTE — PLAN OF CARE
Problem: GENITOURINARY - ADULT  Goal: Maintains or returns to baseline urinary function  Description: INTERVENTIONS:  - Assess urinary function  - Encourage oral fluids to ensure adequate hydration if ordered  - Administer IV fluids as ordered to ensure adequate hydration  - Administer ordered medications as needed  - Offer frequent toileting  - Follow urinary retention protocol if ordered  Outcome: Progressing     Problem: GENITOURINARY - ADULT  Goal: Absence of urinary retention  Description: INTERVENTIONS:  - Assess patients ability to void and empty bladder  - Monitor I/O  - Bladder scan as needed  - Discuss with physician/AP medications to alleviate retention as needed  - Discuss catheterization for long term situations as appropriate  Outcome: Progressing     Problem: GENITOURINARY - ADULT  Goal: Urinary catheter remains patent  Description: INTERVENTIONS:  - Assess patency of urinary catheter  - If patient has a chronic valadez, consider changing catheter if non-functioning  - Follow guidelines for intermittent irrigation of non-functioning urinary catheter  Outcome: Progressing     Problem: PAIN - ADULT  Goal: Verbalizes/displays adequate comfort level or baseline comfort level  Description: Interventions:  - Encourage patient to monitor pain and request assistance  - Assess pain using appropriate pain scale  - Administer analgesics based on type and severity of pain and evaluate response  - Implement non-pharmacological measures as appropriate and evaluate response  - Consider cultural and social influences on pain and pain management  - Notify physician/advanced practitioner if interventions unsuccessful or patient reports new pain  Outcome: Progressing     Problem: INFECTION - ADULT  Goal: Absence or prevention of progression during hospitalization  Description: INTERVENTIONS:  - Assess and monitor for signs and symptoms of infection  - Monitor lab/diagnostic results  - Monitor all insertion sites  - Administer medications as ordered  - Instruct and encourage patient and family to use good hand hygiene technique  Outcome: Progressing

## 2021-03-04 NOTE — QUICK NOTE
Doing well voiding better than pre-op without bleeding or fevers  Home today no aspirin next one week and xarelto can start tomorrow if urine remains clear  Hold off any more cipro as can have Side Effects if used too long

## 2021-03-04 NOTE — PLAN OF CARE
Problem: GENITOURINARY - ADULT  Goal: Maintains or returns to baseline urinary function  Description: INTERVENTIONS:  - Assess urinary function  - Encourage oral fluids to ensure adequate hydration if ordered  - Administer IV fluids as ordered to ensure adequate hydration  - Administer ordered medications as needed  - Offer frequent toileting  - Follow urinary retention protocol if ordered  Outcome: Progressing  Goal: Absence of urinary retention  Description: INTERVENTIONS:  - Assess patients ability to void and empty bladder  - Monitor I/O  - Bladder scan as needed  - Discuss with physician/AP medications to alleviate retention as needed  - Discuss catheterization for long term situations as appropriate  Outcome: Progressing  Goal: Urinary catheter remains patent  Description: INTERVENTIONS:  - Assess patency of urinary catheter  - If patient has a chronic valadez, consider changing catheter if non-functioning  - Follow guidelines for intermittent irrigation of non-functioning urinary catheter  Outcome: Progressing     Problem: SKIN/TISSUE INTEGRITY - ADULT  Goal: Skin integrity remains intact  Description: INTERVENTIONS  - Identify patients at risk for skin breakdown  - Assess and monitor skin integrity  - Assess and monitor nutrition and hydration status  - Monitor labs (i e  albumin)  - Assess for incontinence   - Turn and reposition patient  - Assist with mobility/ambulation  - Relieve pressure over bony prominences  - Avoid friction and shearing  - Provide appropriate hygiene as needed including keeping skin clean and dry  - Evaluate need for skin moisturizer/barrier cream  - Collaborate with interdisciplinary team (i e  Nutrition, Rehabilitation, etc )   - Patient/family teaching  Outcome: Progressing  Goal: Incision(s), wounds(s) or drain site(s) healing without S/S of infection  Description: INTERVENTIONS  - Assess and document risk factors for skin impairment   - Assess and document dressing, incision, wound bed, drain sites and surrounding tissue  - Consider nutrition services referral as needed  - Oral mucous membranes remain intact  - Provide patient/ family education  Outcome: Progressing  Goal: Oral mucous membranes remain intact  Description: INTERVENTIONS  - Assess oral mucosa and hygiene practices  - Implement preventative oral hygiene regimen  - Implement oral medicated treatments as ordered  - Initiate Nutrition services referral as needed  Outcome: Progressing     Problem: HEMATOLOGIC - ADULT  Goal: Maintains hematologic stability  Description: INTERVENTIONS  - Assess for signs and symptoms of bleeding or hemorrhage  - Monitor labs  - Administer supportive blood products/factors as ordered and appropriate  Outcome: Progressing     Problem: PAIN - ADULT  Goal: Verbalizes/displays adequate comfort level or baseline comfort level  Description: Interventions:  - Encourage patient to monitor pain and request assistance  - Assess pain using appropriate pain scale  - Administer analgesics based on type and severity of pain and evaluate response  - Implement non-pharmacological measures as appropriate and evaluate response  - Consider cultural and social influences on pain and pain management  - Notify physician/advanced practitioner if interventions unsuccessful or patient reports new pain  Outcome: Progressing     Problem: INFECTION - ADULT  Goal: Absence or prevention of progression during hospitalization  Description: INTERVENTIONS:  - Assess and monitor for signs and symptoms of infection  - Monitor lab/diagnostic results  - Monitor all insertion sites, i e  indwelling lines, tubes, and drains  - Monitor endotracheal if appropriate and nasal secretions for changes in amount and color  - [place] appropriate cooling/warming therapies per order  - Administer medications as ordered  - Instruct and encourage patient and family to use good hand hygiene technique  - Identify and instruct in appropriate isolation precautions for identified infection/condition  Outcome: Progressing  Goal: Absence of fever/infection during neutropenic period  Description: INTERVENTIONS:  - Monitor WBC    Outcome: Progressing

## 2021-03-04 NOTE — CASE MANAGEMENT
Patient is written for dc  CM met with patient and discussed dc planning and discussed NELSON notice  Patient verbally states understanding and a copy provided  Patient will have a ride home with his son Jess Rasmussen at [employer]  No needs identified

## 2021-03-04 NOTE — NURSING NOTE
Patient discharged via wheelchair accompanied by PCA  Josh cath taken out by Abi Murillo, Manager  All belongings sent with patient  No prescriptions written  Instructions on signs/symptoms of bladder distention discussed with patient  Patient aware to continue his Xarelto starting tomorrow  All questions and concerns answered

## 2021-04-15 PROBLEM — R31.9 HEMATURIA: Status: ACTIVE | Noted: 2021-01-01

## 2021-04-15 PROBLEM — N39.0 UTI (URINARY TRACT INFECTION): Status: ACTIVE | Noted: 2021-01-01

## 2021-04-16 PROBLEM — N17.9 AKI (ACUTE KIDNEY INJURY) (HCC): Status: ACTIVE | Noted: 2021-01-01

## 2021-04-16 PROBLEM — D62 ACUTE BLOOD LOSS ANEMIA: Status: ACTIVE | Noted: 2021-01-01

## 2021-04-16 NOTE — ASSESSMENT & PLAN NOTE
· MARLENA NSCLC    From prior notes, patient has not gotten this biopsied yet  · Follows with [facility] in the outpatient setting  · Follow up outpatient

## 2021-04-16 NOTE — ED NOTES
Valerio d/c'd per order, bleeding from noted, DEEP Rodriges at bedside     Maxime Rubio RN  04/16/21 1105

## 2021-04-16 NOTE — OP NOTE
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]    Procedure(s) (LRB):  CYSTOSCOPY EVACUATION OF CLOTS; FULGERATION (N/A)    Specimen(s):  * No specimens in log *    Estimated Blood Loss:   Minimal    Drains:  Urethral Catheter Coude 18 Fr  (Active)   Goal for Removal Will consult urology 21 0335   Number of days: 0       Continuous Bladder Irrigation Three-way (Active)   Number of days: 0       Anesthesia Type:   General    Operative Indications:  Hematuria [R31 9]  Benign prostatic hyperplasia with urinary obstruction [N40 1, N13 8]  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]      Operative Findings:  Large left lateral wall diverticulum filled with clot  No active source for bleeding identified  Large ventral false passage in bulbar urethra  Complications:   None    Procedure and Technique:  Shakira Ch is an 58-year-old male known to Dr Gabbie Fernandez he has undergone prior TURP and TURBT  He is admitted at [facility] with gross hematuria  He is in clot retention  A CT scan showed a large diverticulum filled with clot  An attempt by the advanced practitioner was made to pass the cystoscope to place a catheter and this was unsuccessful  Based on the amount of reported hematuria and the CT scan findings as well as difficulty with bedside cystoscopy I recommend proceeding with cystoscopy and clot evacuation in the operating room  Risk and benefits of the procedure discussed and reviewed informed consent obtained    The patient was brought to the operating room on  2021  After the smooth induction of general LMA anesthesia, patient was placed in dorsal lithotomy position  His genitalia prepped and draped in sterile fashion  Intravenous antibiotics were administered  Time-out was performed with all members of the operative team confirming the patient's identity and procedure to be performed  A 22 [place] rigid cystoscope was passed through the urethra  A large ventral false passage was identified  The scope was easily manipulated into the bladder  Bladder was thoroughly inspected  Status post TURP changes were noted with the prostatic channel being widely patent  The bladder was entered  It was filled with clot  There was no active bleeding identified  The Ellik was utilized to remove all clot  I then reinspected the bladder  The diverticulum was completely filled with clot  I was able to manipulate the tip of the resectoscope into the diverticulum and evacuate the clots with the Ellik  The diverticulum was inspected and there appeared to be a prior biopsy site which could have been the source of bleeding  I then switched to roller ball electrocautery and fulgurated the prostate as well as the diverticulum  The remainder of the bladder was free and clear mucosal abnormalities or lesions  Both ureteral orifices had been identified and preserved at the start and completion of the case  At this point all clot had been evacuated and there was no active bleeding appreciated  I exchanged the resectoscope for a cystoscope and placed a wire into the bladder followed by a 24 [place] 3 way hematuria catheter  I removed the tip of the catheter to converted to a [place] tip style catheter and placed this over a wire  20 cc were placed into the balloon and continuous bladder irrigation was initiated  Urine was clear upon departure from the operating room  Overall the patient tolerated the procedure well number no complications    The patient was extubated in the operating room transferred the PACU in stable condition at the conclusion of the case        Patient Disposition:  PACU stable and extubated    SIGNATURE: Qi Coombs MD  DATE: April 16, 2021  TIME: 5:23 PM

## 2021-04-16 NOTE — ED NOTES
Per DEEP Rodriges he never ordered a transfusion, ordered 2 units to be prepared     Maxime Rubio, RN  04/16/21 7719 [address], RN  04/16/21 1106 [facility], RN  04/16/21 9185

## 2021-04-16 NOTE — ANESTHESIA PREPROCEDURE EVALUATION
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)      CAD/PCI/MI/CHF -- denies, A fib on xarelto;   COPD/ASTHMA/ANA -- denies  PROBS WITH PRIOR ANESTHESIA -- denies  NPO STATUS CONFIRMED    Stress test 1/21:  Large sized, moderately intense apical to basal inferior defect that extends to the basal inferoseptum  This defect reverses in the apical segment and is fixed in the mid and basal segments suggesting scar mixed with ischemia  Second small sized mildly intense mid to apical anterior and true apical defect which reverses on rest images suggesting mid-distal LAD ischemia  [facility] REPORT  CLINICAL:  Indications:  Patient is here for surveillance of known carotid artery disease  Patient is asymptomatic at this time  Operative History:  2020-11-04 RT SFA/Pop and LT CFA angioplasty  2020-10-09 Left CFA angioplasty, SFA and popliteal artery angioplasty and stent  2020-05-29 Left SFA and popliteal artery PTA  Risk Factors  The patient has history of HTN  Clinical  Right Pressure:  141/ mm Hg, Left Pressure:  134/ mm Hg  FINDINGS:     Right        Impression  PSV  EDV (cm/s)  Direction of Flow  Ratio    Dist  ICA                 80          16                      0 63    Mid  ICA                 112          23                      0 88    Prox   ICA    1 - 49%      88          24                      0 69    Dist CCA                  88          14                              Mid CCA                  128          22 1 42    Prox CCA                  90          14                              Ext Carotid              194           0                      1 52    Prox Vert                 65          14  Antegrade                   Subclavian                65           3                                 Left         Impression  PSV  EDV (cm/s)  Direction of Flow  Ratio    Dist  ICA                 49          16                      0 48    Mid  ICA                 151          31                      1 48    Prox  ICA    70 - 99%    473         103                      4 64    Dist CCA                  55          10                              Mid CCA                  102          12                      1 04    Prox CCA                  98          12                              Ext Carotid              153           0                      1 50    Prox Vert                 66          15  Antegrade                   Subclavian               121           5                                       CONCLUSION:     Impression  RIGHT:  There is <50% stenosis noted in the internal carotid artery  Plaque is  heterogenous and irregular  Vertebral artery flow is antegrade  There is no significant subclavian artery  disease  LEFT:  There is 70-99% stenosis noted in the internal carotid artery  Plaque is  heterogenous and irregular  Vertebral artery flow is antegrade  There is no significant subclavian artery  disease  Physical Exam    Airway    Mallampati score: II  TM Distance: >3 FB       Dental   No notable dental hx     Cardiovascular      Pulmonary      Other Findings        Anesthesia Plan  ASA Score- 3     Anesthesia Type- general with ASA Monitors  Additional Monitors:   Airway Plan:     Comment:  MYRIAM Mi , have personally seen and evaluated the patient prior to anesthetic care    I have reviewed the pre-anesthetic record, and other medical records if appropriate to the anesthetic care   If a CRNA is involved in the case, I have reviewed the CRNA assessment, if present, and agree  Risks/benefits and alternatives discussed with patient including possible PONV, sore throat, and possibility of rare anesthetic and surgical emergencies          Plan Factors-    Chart reviewed  EKG reviewed  Imaging results reviewed  Existing labs reviewed  Patient summary reviewed  Patient is not a current smoker  Patient not instructed to abstain from smoking on day of procedure  Induction- intravenous  Postoperative Plan- Plan for postoperative opioid use  Planned trial extubation    Informed Consent- Anesthetic plan and risks discussed with patient  I personally reviewed this patient with the CRNA  Discussed and agreed on the Anesthesia Plan with the CRNA  Radha Valladares

## 2021-04-16 NOTE — ED PROVIDER NOTES
History  Chief Complaint   Patient presents with    Blood in Urine     pt stated this morning he started w/ blood in urine, states he noticed clots in urine  had poly surgery yesterday on bladder and had them burned  2nd surgery in past 2-3 months on bladder  +xarelto     HPI   Patient is a 59-year-old male that reports to the emergency department with hematuria  He notes he was seen by his urologist earlier today, was instructed to drink plenty of fluids and go to the emergency department if his hematuria were to continue  He denies any fevers, chills, sweats, dysuria, hematuria  Patient has a history of recent TURP, see chart for review  He is managed by Dr Flip Pantoja  No chest pain or shortness of breath  No abdominal pain or tenderness  No flank pain  Medical decision makin-year-old male, nitrite positive, will treat for UTI, will admit for observation after discussion with Urology team   Of note, patient does have mild fatigue related to his anemia, will continue to trend labs overnight       =====================================  Progress Notes  Documented in this encounter  Adal Nobles MD - 04/15/2021 10:30 AM EDT  Formatting of this note might be different from the original   Past  History:  1  Hematuria   2  TURP   3  Intermittent urinary retention requiring CIC  4  BPH finasteride 5mg started 20 at time for office cystoscopy   5  Bipolar TURP 21  6  Bladder cancer  01/15/21 [address] [place] [address]    Past Medical History:    1  A fib, on Xarelto  4 8 cm infrarenal AAA   2  CAD  3  Bowel resection   4  HTN  5  Vit C IV 100gm 2x one week and then 1 tx the other week   Doing this for his Lung Cancer     PSA and Miscellaneous Labs:  Date Lab Test Results  20 Hgb 12 3  21 C&S Providencia Rettgeri   21 C&S Enterococcus Faecalis  21 C&S Morganella Morganii   21 Hgb 10 3  21 C&S Staphylococcus Epidermidis Amina Parra instillation)    Procedures:  Date Procedure Results  12/28/20 Cystoscopy Mild/moderate prostate obstruction, severe trabeculation, and a 2 cm bladder mass left trigone/posterior wall  Finasteride started at time of cystoscopy  Luo removed  01/15/21 TURBT Fulguration 2-5 cm lesion  Medium-sized tumor at the right trigone behind the ureteral orifice  He also had a broad-based smooth appearing 1 cm lesion in the posterior aspect of the left posterior wall bladder diverticulum  This was to large and in a portion of the bladder thinned by diverticulum to be treated effectively without perforation  03/02/21 Bipolar TURP Pathology; BPH    03/19/21 Bladder instillation 160 Gentamicin instilled into bladder after irrigation  Straight cath with 16 coude for approx  300 ml  Of infected urine  04/14/21 TURBT medium Left posterior wall bladder diverticulum  Uroflow:  Date Qmax(ml/s) Avg(ml/s) Vol(ml) PVR(ml) Comments  12/26/20 _ _ _ _ SL Mitchell retention 600 ml  12/28/21 Self cathed for approx 500 ml of urine with hematuria present  01/25/21 113  01/26/21 Luo placed and irrigated with 500 ml  Sterile water  No clots  Did not clear perfectly but set to drainage  02/03/21 Luo removal  03/12/21 170  03/19/21 294  03/24/21 61  04/07/21 427 PVR after second void 325 Ml  Radiology:  Date Study Results  12/26/20 CT scan Non obstructing left-sided intrarenal calculi  No hydronephrosis  Infrarenal abdominal aortic aneurysm measuring 4 8cm  Large posterior left urinary bladder diverticulum with distention of the urinary bladder  Tree-in-bud airspace opacities on left upper Pole which may represent small vessel disease  Subjective (Chief Complaint): (location/duration/quality/timing/context/severity)  81 y/o male with recurrent gross hematuria presents for post op TURBT problems  Review of Systems     Exam:  There were no vitals taken for this visit  Physical Exam  Appears alert and in no acute distress   No significant suprapubic distention  Assessment:  1  Continued bleeding after transurethral resection of tumor noted in the bladder diverticulum  Plan:  1  Hold blood thinners today  2  Drink lots of water today  3  Drain bladder at least 3 times today with self cath and call if develops retention with clots  May need to be hospitalized for continuous irrigation  4  Will need cystoscopy in 6 weeks  Should consider discussing with the cardiologist a watchman procedure  Scribe Attestation: By signing my name below, I, Carlos Street attest that this documentation has been prepared under the direction and in the presence of Silver Moore MD   Electronically signed: Carlos Street (Scribe) 4/15/2021 9:54 AM         Electronically signed by Silver Moore MD at 04/15/2021 11:32 AM EDT    ===============================================        Prior to Admission Medications   Prescriptions Last Dose Informant Patient Reported?  Taking?   finasteride (PROSCAR) 5 mg tablet  Self Yes No   Sig: Take 5 mg by mouth daily   hydrochlorothiazide (HYDRODIURIL) 25 mg tablet  Self Yes No   Sig: Take 25 mg by mouth daily   metoprolol tartrate (LOPRESSOR) 25 mg tablet  Self Yes No   Sig: Take 25 mg by mouth daily    rivaroxaban (Xarelto) 10 mg tablet  Self Yes No   Sig: Take 10 mg by mouth daily at bedtime To check with surgeon when to hold RX  Pt advised to stop after 2/26/21   rosuvastatin (CRESTOR) 20 MG tablet   No No   Sig: Take 1 tablet (20 mg total) by mouth daily      Facility-Administered Medications: None       Past Medical History:   Diagnosis Date    AAA [facility]) ([facility] )     Atrial fibrillation ([facility] )     Cancer ([facility] ) 2014    lung ca- right lung  stage 3     [facility] )     Hyperlipidemia     Hypertension     PAD [facility]peripheral artery disease) [facility]     Pneumonia     PVD (peripheral vascular disease) ([facility] )        Past Surgical History:   Procedure Laterality Date    BOWEL RESECTION      COLONOSCOPY      HERNIA REPAIR      X2    IR AORTAGRAM WITH RUN-OFF  2020    SEVERAL    IR AORTAGRAM WITH RUN-OFF  10/9/2020    IR LOWER EXTREMITY ANGIOGRAM  2020    SD CYSTOURETHROSCOPY,FULGUR 2-5 CM LESN N/A 1/15/2021    Procedure: CYSTOSCOPY, TRANSURETHRAL RESECTION OF BLADDER TUMOR (TURBT); Surgeon: Jayashree Cook MD;  Location: [address] [place] [address];  Service: Urology    SD Lety Points 3RD+ ORD [facility]V ABDL [facility] Left 10/9/2020    Procedure: ARTERIOGRAM Left lower extremity agram right femoral access with SFA stenting/angioplasty;  Surgeon: Suzanne Simmons MD;  Location:  MAIN OR;  Service: Vascular    SD Lety Points 3RD+ ORD SLCTV ABDL PEL/formerly [facility] Right 2020    Procedure: ARTERIOGRAM RLE angiogram L fem access;  Surgeon: Elaine Glaser MD;  Location:  MAIN OR;  Service: Vascular    SD TRANSURETHRAL ELEC-SURG PROSTATECTOM N/A 3/2/2021    Procedure: CYSTOSCOPY, TRANSURETHRAL RESECTION OF Francisco J Flisidro;  Surgeon: Jayashree Cook MD;  Location: [facility];  Service: Urology    PROSTATE SURGERY      REMOVAL VENOUS PORT (PORT-A-CATH)         Family History   Problem Relation Age of Onset    Alzheimer's disease Mother     Cancer Father      I have reviewed and agree with the history as documented  E-Cigarette/Vaping    E-Cigarette Use Never User      E-Cigarette/Vaping Substances    Nicotine No     THC No     CBD No     Flavoring No     Other No     Unknown No      Social History     Tobacco Use    Smoking status: Former Smoker     Years: 30 00     Types: Cigarettes     Quit date: 1988     Years since quittin 2    Smokeless tobacco: Never Used   Substance Use Topics    Alcohol use: Yes     Comment: SOCIAL    Drug use: Never       Review of Systems   Constitutional: Positive for fatigue  Genitourinary: Positive for hematuria  All other systems reviewed and are negative        Physical Exam  Physical Exam  Vitals signs and nursing note reviewed  Constitutional:       Appearance: He is well-developed  He is not diaphoretic  HENT:      Head: Normocephalic and atraumatic  Right Ear: External ear normal       Left Ear: External ear normal       Nose: No congestion  Eyes:      General:         Right eye: No discharge  Left eye: No discharge  Extraocular Movements: Extraocular movements intact  Neck:      Musculoskeletal: Normal range of motion and neck supple  Cardiovascular:      Rate and Rhythm: Normal rate and regular rhythm  Heart sounds: Normal heart sounds  No murmur  Pulmonary:      Effort: Pulmonary effort is normal  No respiratory distress  Breath sounds: Normal breath sounds  No wheezing  Abdominal:      General: There is no distension  Palpations: Abdomen is soft  Tenderness: There is no abdominal tenderness  Musculoskeletal:         General: No tenderness or signs of injury  Skin:     General: Skin is warm and dry  Findings: No erythema  Neurological:      General: No focal deficit present  Mental Status: He is alert and oriented to person, place, and time  Motor: No weakness     Psychiatric:         Mood and Affect: Mood normal          Behavior: Behavior normal          Vital Signs  ED Triage Vitals [04/15/21 2126]   Temperature Pulse Respirations Blood Pressure SpO2   97 6 °F (36 4 °C) 56 18 147/87 99 %      Temp Source Heart Rate Source Patient Position - Orthostatic VS BP Location FiO2 (%)   Oral Monitor Sitting Left arm --      Pain Score       --           Vitals:    04/15/21 2126   BP: 147/87   Pulse: 56   Patient Position - Orthostatic VS: Sitting         Visual Acuity      ED Medications  Medications   lidocaine (URO-JET) 2 % urethral/mucosal gel 1 application (has no administration in time range)   cefepime (MAXIPIME) 2 g/50 mL dextrose IVPB (has no administration in time range)       Diagnostic Studies  Results Reviewed     Procedure Component Value Units Date/Time    Urinalysis with microscopic [[id number]]  (Abnormal) Collected: 04/15/21 2130    Lab Status: Final result Specimen: Urine, Clean Catch Updated: 04/15/21 2254     Clarity, UA Cloudy     Color, UA Red     Specific Gravity, UA 1 025     pH, UA 7 0     Glucose, UA Negative mg/dl      Ketones, UA Negative mg/dl      Blood, UA Negative     Protein, UA >=300 mg/dl      Nitrite, UA Positive     Bilirubin, UA Negative     Urobilinogen, UA 0 2 E U /dl      Leukocytes, UA Negative     WBC, UA None Seen /hpf      RBC, UA Innumerable /hpf      Bacteria, UA None Seen /hpf      Epithelial Cells Occasional /hpf     Comprehensive metabolic panel [[id number]]  (Abnormal) Collected: 04/15/21 2151    Lab Status: Final result Specimen: Blood from Arm, Right Updated: 04/15/21 2227     Sodium 140 mmol/L      Potassium 3 4 mmol/L      Chloride 97 mmol/L      CO2 29 mmol/L      ANION GAP 14 mmol/L      BUN 24 mg/dL      Creatinine 1 26 mg/dL      Glucose 129 mg/dL      Calcium 8 7 mg/dL      Corrected Calcium 9 4 mg/dL      AST 23 U/L      ALT 27 U/L      Alkaline Phosphatase 76 U/L      Total Protein 6 7 g/dL      Albumin 3 1 g/dL      Total Bilirubin 0 18 mg/dL      eGFR 51 ml/min/1 73sq m     Narrative:      [place] guidelines for Chronic Kidney Disease (CKD):     Stage 1 with normal or high GFR (GFR > 90 mL/min/1 73 square meters)    Stage 2 Mild CKD (GFR = 60-89 mL/min/1 73 square meters)    Stage 3A Moderate CKD (GFR = 45-59 mL/min/1 73 square meters)    Stage 3B Moderate CKD (GFR = 30-44 mL/min/1 73 square meters)    Stage 4 Severe CKD (GFR = 15-29 mL/min/1 73 square meters)    Stage 5 End Stage CKD (GFR <15 mL/min/1 73 square meters)  Note: GFR calculation is accurate only with a steady state creatinine    APTT [[id number]]  (Abnormal) Collected: 04/15/21 2151    Lab Status: Final result Specimen: Blood from Arm, Right Updated: 04/15/21 2213     PTT 43 seconds     Protime-INR [[id number]]  (Abnormal) Collected: 04/15/21 2151    Lab Status: Final result Specimen: Blood from Arm, Right Updated: 04/15/21 2213     Protime 16 0 seconds      INR 1 26    [place] draw [[id number]] Collected: 04/15/21 2151    Lab Status: In process Specimen: Blood from Arm, Right Updated: 04/15/21 2209    Narrative: The following orders were created for panel order [place] draw  Procedure                               Abnormality         Status                     ---------                               -----------         ------                     Kelvin Howe Top on ADLS[[id number]]                                                      Gold top on QHPM[[id number]]                                 Final result               Green / Black tube on hold[[id number]]                       Final result                 Please view results for these tests on the individual orders  CBC and differential [[id number]]  (Abnormal) Collected: 04/15/21 2151    Lab Status: Final result Specimen: Blood from Arm, Right Updated: 04/15/21 2203     WBC 6 82 Thousand/uL      RBC 2 82 Million/uL      Hemoglobin 9 2 g/dL      Hematocrit 27 5 %      MCV 98 fL      MCH 32 6 pg      MCHC 33 5 g/dL      RDW 15 0 %      MPV 10 0 fL      Platelets 976 Thousands/uL      nRBC 0 /100 WBCs      Neutrophils Relative 75 %      Immat GRANS % 0 %      Lymphocytes Relative 13 %      Monocytes Relative 11 %      Eosinophils Relative 1 %      Basophils Relative 0 %      Neutrophils Absolute 5 07 Thousands/µL      Immature Grans Absolute 0 03 Thousand/uL      Lymphocytes Absolute 0 91 Thousands/µL      Monocytes Absolute 0 75 Thousand/µL      Eosinophils Absolute 0 04 Thousand/µL      Basophils Absolute 0 02 Thousands/µL                  No orders to display              Procedures  Procedures         ED Course  ED Course as of Apr 15 2310   Thu Apr 15, 2021   2252 Patient was seen today         2308 Of note, spoke with Urology, jenni Rubin admit to [facility] for obs, place 3 way  MDM    Disposition  Final diagnoses:   Hematuria   Urinary tract infection     Time reflects when diagnosis was documented in both MDM as applicable and the Disposition within this note     Time User Action Codes Description Comment    4/15/2021 11:07 PM Mine Sanchez, [address] [R31 9] Hematuria     4/15/2021 11:07 PM Mine Sanchez, [address] [N39 0] Urinary tract infection       ED Disposition     ED Disposition Condition Date/Time Comment    Admit Stable Thu Apr 15, 2021 11:07 PM Case was discussed with Catina Gutierrez and the patient's admission status was agreed to be Admission Status: observation status to the service of Dr Loy Murphy   Follow-up Information    None         Patient's Medications   Discharge Prescriptions    No medications on file     No discharge procedures on file      PDMP Review       Value Time User    PDMP Reviewed  Yes 10/9/2020  9:55 Heather Simmons MD          ED Provider  Electronically Signed by           Janine Wade MD  04/15/21 7298

## 2021-04-16 NOTE — DISCHARGE SUMMARY
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)    UTI (urinary tract infection)  Resolved Problems:    * No resolved hospital problems  *      Service:  [facility] Internal Medicine    Consulting Providers   Urology          Discharge Condition: stable    Discharge Disposition: Home/Self Care    Discharge summary:   Gal Fields  Is a 70-year-old male who is recent status post TURP by Dr Jacqueline Ayon  Patient has moderate prostatic obstruction with severe Bladder trabeculation and diverticula  He also has atrial fibrillation and is on Xarelto  Patient continued to have hematuria post procedure  Patient had his catheter removed by his urologist on 03/08/2021 however his hematuria continued to persist   He was asked to hold his blood thinner Xarelto  Patient presented to the ER with gross hematuria  Was hemodynamically stable  However developed urinary retention and failed multiple attempts at catheter placement  Finally a catheter was placed and CBI initiated by the ER staff however this catheter obstructed in the middle of the night  The catheter was again exchanged and later obstructed in the early mornings  Revealing bladder full of clots  Per his primary urologist he will not tolerate bladder overdistention and is at high risk for bladder perforation given large diverticula   Urology  recommended urgent transfer to [facility]  Barbara Cagleehem for urgent cystoscopy and clot evacuation  Patient's hemoglobin had dropped to 7 7  Has received 2 units of PRBC  I spoke to Dr Llyy Briceno  from [place] [address] Internal [facility] who kindly accepted the patient        Discharge Medications   Please see Medical Reconciliation Discharge Form    Discharge  Statement   Total Time Spent today including physical exam, discussion with patient and family, and discharge arrangements/care 34 minutes

## 2021-04-16 NOTE — ED NOTES
Patient reports cath not draining, informed provider is aware, call bell within reach, bed low position, side rail up x [address] [facility] Keysha, RN  04/16/21 [id number]

## 2021-04-16 NOTE — ED NOTES
Pt  To be picked up at 1430 by [facility]  Call report at [phone]  Accepting doctor is Dr Dorethea Scheuermann, RN  04/16/21 0189

## 2021-04-16 NOTE — H&P
[address] [facility]  H&P- Curvin Popper Saad 1934, 80 y o  male MRN: [id number]  Unit/Bed#: [facility] 917-01 Encounter: [id number]  Primary Care Provider: Symone Deutsch MD   Date and time admitted to hospital: 4/16/2021  3:09 PM    * Gross hematuria  Assessment & Plan  · Status post TURP by Dr Cristopher Sherwood on 3/2/2021  W/ subsequent polyp removal yesterday, however no notes about this  Noticed hematuria with clots  · He was seen on 4/7 & offered prostate artery embolization, also seen on 4/15 - advised to stop xarelto, continue hydration  · Hematuria persisted  · 3 way inserted at [facility] - CBI started but valadez got clogged twice requiring catheter exchange  · Was then self voiding  · CT A/P done : Soft tissue nodularity along the posterior and left lateral bladder wall  Patient does have history of resection of bladder tumor 1/15/2021  Residual or progression of malignancy must be considered   Large bladder diverticulum containing blood products  · Advised to be transferred here for cystoscopy by urology  · S/p cystoscopy: large left lateral wall diverticulum filled with clots, clots evacuated & fulguration done by urology  · Cont holding xarelto  · Monitor H&H q 8    TENZIN (acute kidney injury) ([address] )  Assessment & Plan  · POA at [facility] with Cr 1 2  · From obstruction vs blood loss  · Improved now to 0 7    Acute blood loss anemia  Assessment & Plan  · 2/2 hematuria  · His previous baseline seems to be 12-13, last month he was 10 3  · On admission hemoglobin was 9 2 which on repeat dropped to 7 7  · S/p 2 U PRBC  · H&H q 8 hr  Check  · Hold xarelto    UTI (urinary tract infection)  Assessment & Plan  · UA with positive nitrities, no WBC or leucs  · Started on IV cefepime, cont that  · Cultures not sent    Benign prostatic hyperplasia with urinary obstruction  Assessment & Plan  · S/p TURP/TURBT last month  · Continue finasteride    Atrial fibrillation ([address] )  Assessment & Plan  · Continue metoprolol tartarate 25 QD  · Holding xarelto d/t hematuria  · F/u with his cardiologist about considering watchman device    Malignant neoplasm of upper lobe of right lung ([facility] )  Assessment & Plan  · MARLENA NSCLC  From prior notes, patient has not gotten this biopsied yet  · Follows with [facility] in the outpatient setting  · Follow up outpatient     Abdominal aortic aneurysm (AAA) without rupture [facility])  Assessment & Plan  · Follows with outpatient vascular surgery  · December 2020 with dimensions of 4 8  · Q 6 month follow-up  · Outpatient follow-up    History and Physical - [facility] Internal Medicine    Patient Information: Roslyn Fischer 80 y o  male MRN: [id number]  Unit/Bed#: [facility] 917-01 Encounter: [id number]  Admitting Physician: Johnie Rao MD  PCP: Pascual Lombard, MD  Date of Admission:  04/16/21    Assessment/Plan:    Hospital Problem List:     Principal Problem:    Gross hematuria  Active Problems:    Abdominal aortic aneurysm (AAA) without rupture ([facility] )    Malignant neoplasm of upper lobe of right lung (HCC)    Atrial fibrillation ([facility] )    Benign essential hypertension    Benign prostatic hyperplasia with urinary obstruction    UTI (urinary tract infection)    Acute blood loss anemia    TENZIN (acute kidney injury) ([facility] )      VTE Prophylaxis: Pharmacologic VTE Prophylaxis contraindicated due to hematuria  / sequential compression device   Code Status: FC  POLST: There is no POLST form on file for this patient (pre-hospital)    Anticipated Length of Stay:  Patient will be admitted on an Inpatient basis with an anticipated length of stay of  > 2 midnights  Justification for Hospital Stay: hematuria, cystoscopy    Total Time for Visit, including Counseling / Coordination of Care: 45 minutes  Greater than 50% of this total time spent on direct patient counseling and coordination of care  Chief Complaint:   Transfer from [facility] for cystoscopy    History of Present Illness:    Roslyn Fischer is a 80 y o  male who presents as a Transfer from [facility] for cystoscopy, where he presented last night with ongoing hematuria  Patient with past history of AAA, lung cancer under observation, prostate cancer, BPH, had undergone TURP/TURBT last month and ever since he has had hematuria  He has been in touch with his urologist saw him couple of times, when he saw him on 04/07 prostatic artery embolization was recommended anyhow again saw him yesterday on 04/15/2021 and patient was advised to stop taking his Xarelto for atrial fibrillation  Apparently during that visit he had a polyp removal procedure done and since then his hematuria has gotten worse  He was feeling slightly lightheaded on standing up and ambulation  His hemoglobin had dropped from baseline of around 12-9 2 hence he was admitted there for hematuria treatment with CBI, 3 way Luo catheter was placed and CBI initiated but overnight catheter clogged twice and required exchange  Seen in consultation by Urology this morning, CT scan ordered, showed a large bladder diverticulum containing clots, Urology recommended him to be transferred here for cystoscopy exploration and clot evacuation  Immediately went to the OR, saw him postprocedure he is denying any symptoms and asking if he can eat    Review of Systems:    Review of Systems   Constitutional: Negative for activity change, appetite change, chills and fever  HENT: Negative for congestion and rhinorrhea  Respiratory: Negative for cough, shortness of breath and wheezing  Cardiovascular: Negative for chest pain and palpitations  Gastrointestinal: Negative for abdominal pain, diarrhea, nausea and vomiting  Genitourinary: Positive for hematuria  Negative for difficulty urinating and dysuria  Neurological: Negative for dizziness and light-headedness  All other systems reviewed and are negative        Past Medical and Surgical History:     Past Medical History:   Diagnosis Date    AAA (abdominal aortic aneurysm) ([facility] )     Atrial fibrillation ([facility] )     Cancer ([facility] ) 2014    lung ca- right lung  stage 3     Carotid artery disease (HCC)     Hyperlipidemia     Hypertension     PAD (peripheral artery disease) (HCC)     Pneumonia     PVD (peripheral vascular disease) ([facility] )        Past Surgical History:   Procedure Laterality Date    BOWEL RESECTION      COLONOSCOPY      HERNIA REPAIR      X2    IR AORTAGRAM WITH RUN-OFF  5/29/2020    SEVERAL    IR AORTAGRAM WITH RUN-OFF  10/9/2020    IR LOWER EXTREMITY ANGIOGRAM  11/4/2020    NV CYSTOURETHROSCOPY,FULGUR 2-5 CM LESN N/A 1/15/2021    Procedure: CYSTOSCOPY, TRANSURETHRAL RESECTION OF BLADDER TUMOR (TURBT); Surgeon: Lorrayne Cooks, MD;  Location: [address] [place] [address];  Service: Urology    NV [employer] 3RD+ ORD SLCTV ABDL PEL/[facility] Left 10/9/2020    Procedure: ARTERIOGRAM Left lower extremity agram right femoral access with SFA stenting/angioplasty;  Surgeon: Florentino Simmons MD;  [facility]: [facility];  Service: Vascular    NV [employer] 3RD+ ORD SLCTV ABDL PEL/[facility] Right 11/4/2020    Procedure: ARTERIOGRAM RLE angiogram L fem access;  Surgeon: Philip Moreira MD;  Location: [facility];  Service: Vascular    NV TRANSURETHRAL ELEC-SURG PROSTATECTOM N/A 3/2/2021    Procedure: CYSTOSCOPY, TRANSURETHRAL RESECTION OF [employer];  Surgeon: Lorrayne Cooks, MD;  Location: [address] [place] [address];  Service: Urology    PROSTATE SURGERY      REMOVAL VENOUS PORT (PORT-A-CATH)         Meds/Allergies:    Prior to Admission medications    Medication Sig Start Date End Date Taking?  Authorizing Provider   finasteride (PROSCAR) 5 mg tablet Take 5 mg by mouth daily    Historical Provider, MD   hydrochlorothiazide (HYDRODIURIL) 25 mg tablet Take 25 mg by mouth daily    Historical Provider, MD   metoprolol tartrate (LOPRESSOR) 25 mg tablet Take 25 mg by mouth daily     Historical Provider, MD   rivaroxaban (Xarelto) 10 mg tablet Take 10 mg by mouth daily at bedtime To check with surgeon when to hold RX  Pt advised to stop after 21    Historical Provider, MD   rosuvastatin (CRESTOR) 20 MG tablet Take 1 tablet (20 mg total) by mouth daily  Patient not taking: Reported on 2021 2/15/21   Breezy Shah Doctor, MD     I have reviewed home medications with a medical source (PCP, Pharmacy, other)  Allergies: Allergies   Allergen Reactions    Sulfa Antibiotics Shortness Of Breath     Other reaction(s): Other (See Comments)  SOB      Protamine      Other reaction(s): Other (See Comments)  Did not work for patient         Social History:     Marital Status: Single   Occupation:   Patient Pre-hospital Living Situation: home  Patient Pre-hospital Level of Mobility: active  Patient Pre-hospital Diet Restrictions: none  Substance Use History:   Social History     Substance and Sexual Activity   Alcohol Use Yes    Comment: SOCIAL     Social History     Tobacco Use   Smoking Status Former Smoker    Years: 30 00    Types: Cigarettes    Quit date: 1988    Years since quittin 2   Smokeless Tobacco Never Used     Social History     Substance and Sexual Activity   Drug Use Never       Family History:    Family History   Problem Relation Age of Onset    Alzheimer's disease Mother     Cancer Father        Physical Exam:     Vitals:   Blood Pressure: 137/79 (21)  Pulse: 80 (21)  Temperature: (!) 97 °F (36 1 °C) (21)  Temp Source: Oral (21)  Respirations: 18 (21)  SpO2: 100 % (21)    Physical Exam  Vitals signs reviewed  HENT:      Head: Normocephalic and atraumatic  Mouth/Throat:      Mouth: Mucous membranes are moist    Eyes:      Extraocular Movements: Extraocular movements intact  Conjunctiva/sclera: Conjunctivae normal    Cardiovascular:      Rate and Rhythm: Normal rate and regular rhythm  Heart sounds: No murmur  Pulmonary:      Effort: Pulmonary effort is normal  No respiratory distress  Breath sounds: Normal breath sounds  No wheezing  Abdominal:      General: There is no distension  Palpations: Abdomen is soft  Tenderness: There is no abdominal tenderness  Musculoskeletal:      Right lower leg: No edema  Left lower leg: No edema  Skin:     General: Skin is warm  Neurological:      Mental Status: He is alert and oriented to person, place, and time  Additional Data:     Lab Results: I have personally reviewed pertinent reports  Results from last 7 days   Lab Units 04/16/21  1755  04/16/21  0446 04/15/21  2151   WBC Thousand/uL  --   --  5 97 6 82   HEMOGLOBIN g/dL 9 2*  --  7 7* 9 2*   I STAT HEMOGLOBIN   --    < >  --   --    HEMATOCRIT % 26 9*  --  23 1* 27 5*   HEMATOCRIT, ISTAT   --    < >  --   --    PLATELETS Thousands/uL  --   --  133* 167   NEUTROS PCT %  --   --   --  75   LYMPHS PCT %  --   --   --  13*   MONOS PCT %  --   --   --  11   EOS PCT %  --   --   --  1    < > = values in this interval not displayed  Results from last 7 days   Lab Units 04/16/21 1756 04/16/21  1715  04/15/21  2151   POTASSIUM mmol/L 3 5  --    < > 3 4*   CHLORIDE mmol/L 105  --    < > 97*   CO2 mmol/L 29  --    < > 29   CO2, I-STAT mmol/L  --  31   < >  --    BUN mg/dL 20  --    < > 24   CREATININE mg/dL 0 77  --    < > 1 26   CALCIUM mg/dL 8 3  --    < > 8 7   ALK PHOS U/L  --   --   --  76   ALT U/L  --   --   --  27   AST U/L  --   --   --  23   GLUCOSE, ISTAT mg/dl  --  98   < >  --     < > = values in this interval not displayed  Results from last 7 days   Lab Units 04/15/21  2151   INR  1 26*       Imaging: I have personally reviewed pertinent reports  Ct Abdomen Pelvis W Wo Contrast    Result Date: 4/16/2021  Narrative: CT ABDOMEN AND PELVIS WITH AND WITHOUT IV CONTRAST INDICATION:   Hematuria, unknown cause Hematuria  Personal history of lung cancer   COMPARISON:  CT abdomen and pelvis 12/26/2020 TECHNIQUE: Initial CT of the abdomen and pelvis was performed without intravenous contrast   Subsequent dynamic CT evaluation of the abdomen and pelvis was performed after the administration of intravenous contrast in both nephrographic and delayed phases after the administration of intravenous contrast    Axial, sagittal, and coronal 2D reformatted images were created from the source data and submitted for interpretation  Radiation dose length product (DLP) for this visit:  1225 mGy-cm   This examination, like all CT scans performed in the [facility], was performed utilizing techniques to minimize radiation dose exposure, including the use of iterative reconstruction and automated exposure control  IV Contrast:  85 mL of iohexol (OMNIPAQUE) Enteric Contrast:  Enteric contrast was not administered  FINDINGS: ABDOMEN RIGHT KIDNEY AND URETER: No solid renal mass  No detectable urothelial mass  Hypoattenuating foci that are too small to characterize but likely represent cysts are present  No hydronephrosis or hydroureter  No urinary tract calculi  No perinephric collection  LEFT KIDNEY AND URETER: No solid renal mass  No detectable urothelial mass  Tiny hypoattenuating foci that are too small to characters but likely represent cysts are present  No hydronephrosis or hydroureter  Extrarenal pelvis is present  Nonobstructing calculus is present in the midpole  No ureteral calculi  URINARY BLADDER: Large left lateral bladder wall diverticulum is again seen  There is new hyperattenuating fluid throughout the diverticulum, consistent with blood products  There is soft tissue nodularity along the base of the bladder as well as left lateral bladder wall in region of diverticulum, best seen on series 7, images 28 through 40  No calculi   LOWER CHEST:  No clinically significant abnormality identified in the visualized lower chest  LIVER/BILIARY TREE:  1 5 x 1 2 cm ill-defined hypoattenuating lesion is seen within segment IVb of the liver (series 3 image 44)   Subcentimeter hyperattenuating focus is seen within posterior right hepatic lobe (series 3 image 18)  GALLBLADDER:  No calcified gallstones  No pericholecystic inflammatory change  SPLEEN:  Unremarkable  PANCREAS:  Unremarkable  ADRENAL GLANDS:  Right adrenal nodule has increased in size, measuring 1 8 x 1 7 cm while previously measuring 1 0 x 0 8 cm  STOMACH AND BOWEL:  There is colonic diverticulosis without evidence of acute diverticulitis  ABDOMINOPELVIC CAVITY:  No ascites  No free intraperitoneal air  No lymphadenopathy  VESSELS:  4 8 x 4 8 cm abdominal aortic aneurysm is stable  PELVIS REPRODUCTIVE ORGANS:  Unremarkable for patient's age  APPENDIX: No findings to suggest appendicitis  ABDOMINAL WALL/INGUINAL REGIONS:  Unremarkable  OSSEOUS STRUCTURES:  No acute fracture or destructive osseous lesion  Impression: 1  Soft tissue nodularity along the posterior and left lateral bladder wall  Patient does have history of resection of bladder tumor 1/15/2021  Residual or progression of malignancy must be considered  2   Large bladder diverticulum containing blood products  3   Hypoattenuating lesions within the liver, for which metastatic disease must be considered  Further evaluation with MRI of the abdomen with Gadavist recommended  4   Increase in size of right adrenal nodule, concerning for metastasis  This can be further evaluated on MRI performed for characterization of liver lesions  I personally discussed this study with Farzana Siegel on 4/16/2021 at 1:56 PM  Workstation performed: [id number]         Epic / [facility] Everywhere Records Reviewed: Yes     ** Please Note: This note has been constructed using a voice recognition system   **

## 2021-04-16 NOTE — ASSESSMENT & PLAN NOTE
· Follows with outpatient vascular surgery    · December 2020 with dimensions of 4 8  · Q 6 month follow-up  · Outpatient follow-up

## 2021-04-16 NOTE — ED NOTES
Call bell within reach, bed low position, side rails up as appropriate     Gilles Lanes, MIGEL  04/16/21 7617

## 2021-04-16 NOTE — ASSESSMENT & PLAN NOTE
· With nitrite positive urine  · Will cover patient with cefepime given recent urologic instrumentation    · Follow urine cultures

## 2021-04-16 NOTE — ASSESSMENT & PLAN NOTE
· 2/2 hematuria  · His previous baseline seems to be 12-13, last month he was 10 3  · On admission hemoglobin was 9 2 which on repeat dropped to 7 7  · S/p 2 U PRBC  · H&H q 8 hr  Check  · Hold xarelto

## 2021-04-16 NOTE — ASSESSMENT & PLAN NOTE
· Status post TURP by Dr Cora George on 3/2/2021  W/ subsequent polyp removal yesterday, however no notes about this  Noticed hematuria with clots  · He was seen on 4/7 & offered prostate artery embolization, also seen on 4/15 - advised to stop xarelto, continue hydration  · Hematuria persisted  · 3 way inserted [facility] - CBI started but valadez got clogged twice requiring catheter exchange  · Was then self voiding  · CT A/P done : Soft tissue nodularity along the posterior and left lateral bladder wall  Patient does have history of resection of bladder tumor 1/15/2021  Residual or progression of malignancy must be considered   Large bladder diverticulum containing blood products  · Advised to be transferred here for cystoscopy by urology  · S/p cystoscopy: large left lateral wall diverticulum filled with clots, clots evacuated & fulguration done by urology  · Cont holding xarelto  · Monitor H&H q 8

## 2021-04-16 NOTE — ED NOTES
Edwin jackson texted again regarding blood transfusion     Maycol Eng RN  04/16/21 7922 [place] Trevon, RN  04/16/21 7203

## 2021-04-16 NOTE — ASSESSMENT & PLAN NOTE
· Rates controlled  · Anticoagulated with Xarelto  · This will be held given hematuria    · Rate control with metoprolol

## 2021-04-16 NOTE — ED NOTES
Patient bladder scanned, 200 mls noted in bladder, DEEP Rodriges aware and aware valadez is in       Cathryne Michelle, RN  04/16/21 5391

## 2021-04-16 NOTE — ED NOTES
Hammad texted DEEP Rodriges to see if blood is to be transfused, consent needed     Devan Patel RN  04/16/21 0775

## 2021-04-16 NOTE — ED NOTES
This RN, Wilber Ulrich RN, Dr Hampton Schilder & Dr Gwendolyn Taylor attempted insertion of multiple sizes catheters without success  Urology aware       Merlin Macdonald, RN  04/16/21 2328

## 2021-04-16 NOTE — QUICK NOTE
Patient with blocked valadez catheter  Nursing attempted flush of catheter without any return  Contacted urology recommending valadez removal to try and have patient pass clot on his own  Noted to have hgb drop from 9 2 to 7 7  He is asx without blood consent  Will order stat type and screen

## 2021-04-16 NOTE — ED NOTES
Reported that MIGEL Ji had initail 475 ml out and 120 ml irrigated, then valadez clotted        Haydee Tillman RN  04/16/21 [id number]

## 2021-04-16 NOTE — ED NOTES
Tolerating transfusion, call bell within reach, bed low position, side rails up as appropriate     Kevin Green RN  04/16/21 0377

## 2021-04-16 NOTE — ED NOTES
Patient requesting labs be taken from picc line, confirmed with charge MIGEL Verma anyone can obtain from line     Mariluz Lyon RN  04/16/21 4993

## 2021-04-16 NOTE — ASSESSMENT & PLAN NOTE
· [facility] with Cr 1 2  · From obstruction vs blood loss  · Improved now to 0 7

## 2021-04-16 NOTE — ED NOTES
Blood consent noted on chart, per Dr Jenny Wilkins via tiger text to give to units     Mitchel Hernández RN  04/16/21 1557

## 2021-04-16 NOTE — ASSESSMENT & PLAN NOTE
· MARLENA NSCLC    From prior notes, patient has not gotten this biopsied yet  · Follows with [facility] in the outpatient setting  · Follow up outpatient

## 2021-04-16 NOTE — ED NOTES
DEEP Shahid aware of pt's catheter not draining, awaiting new orders       Tish Macdonald, RN  04/16/21 0103

## 2021-04-16 NOTE — CONSULTS
CONSULT    Patient Name: Natalie Doan  Patient MRN: [id number]  Date of Service: 4/16/2021   Date / Time Note Created: 4/16/2021 10:53 AM   Referring Provider: Mariusz Atkinson MD  Provider Creating Note: YELITZA [place]    PCP: Luther Joel  Attending Provider:  Mariusz Atkinson MD     Viktoria Sharma [phone]    Reason for Consult: Hematuria    HPI --Natalie Doan is an 77-year-old male known to Dr hamrony Panchal for history of BPH, refractory gross hematuria, while anticoagulated on Xarelto, status post recent TURP/TURBT  Previous to this he made visits to [address] [place] [address] for retention and gross hematuria with multiple catheters in and out  Per primary urologist, patient has moderate prostatic obstruction with severe bladder trabeculation with resection of approximately 2 cm bladder mass on the left trigone/posterior wall  His catheter was removed at his urologist office 3/8  He was seen again 4/7 by primary urologist for gross hematuria despite recent procedures  Dr Aylin Panchal briefly discussed prostatic artery embolization in office with patient  He re-presented to office 4/15  yesterday with persistent gross hematuria  He was advised to hold blood thinners, hydrate and drain bladder 3 times a day  Of note, patient can not self perform CIC  Dr Aylin Panchal recommended cystoscopy in 6 weeks and cardiologist follow-up to determine if patient would be appropriate candidate for Watchman procedure  Patient presented with urethral bleeding  He was afebrile, hemodynamically stable and without severe complaints of flank, abdominal or suprapubic pain  He developed some urinary retention and multiple attempts at catheter placement were finally successful to put insert three-way catheter  Continuous bladder irrigation was initiated by emergency room staff  However, catheter later obstructed in the middle of the night    Catheter again was exchanged and later obstructed in the early morning hours  Patient managed to void intermittently and remained comfortable  Bedside urinal contain very dark wine-colored urine with clot debris/sediment at bottom of container  Objectively, renal function is within normal limits  However, hemoglobin decreased noted from presenting 9 2 to 7 7 this a m  [facility] Patient was admitted overnight to the Internal Medicine service prior to loss of bladder access  The medicine team have ordered transfusion of packed cells in the interim  Urinalysis is positive for nitrites without wbc's  Urologic consultation requested for further management recommendations  Source:the patient         Active Problems:    Patient Active Problem List   Diagnosis    Abdominal aortic aneurysm (AAA) without rupture (HCC)    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)    Malignant neoplasm of upper lobe of right lung (HCC)    Atrial fibrillation (HCC)    Benign essential hypertension    Hemangioma of liver    Mixed hyperlipidemia    Venous insufficiency    Asymptomatic bilateral carotid artery stenosis    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 Prostatic artery embolization discussed  Hematuria exacerbated by requisite anticoagulation  Recommendations  Patient is admitted to Medicine  Appreciate medical optimization    Monitor H&H, transfuse as needed per primary medical team   Patient has failed multiple attempts by emergency room staff and personally via cystoscopic approach for catheter insertion  Fortunately, patient has voided intermittently and is not in extremis  CT of the abdomen and pelvis with and without contrast for further assessment of  tract and to determine degree of clot burden  Keep patient NPO  If patient requires cystoscopy clot evac, will necessitate transfer to [facility] [place], operated by [facility]  Will update plan contingent on test results and update accordingly  Past Medical History:   Diagnosis Date    AAA (abdominal aortic aneurysm) (HCC)     [facility] )     Cancer ([facility] ) 2014    lung ca- right lung  stage 3     Carotid artery disease (HCC)     Hyperlipidemia     Hypertension     PAD (peripheral artery disease) (HCC)     Pneumonia     PVD (peripheral vascular disease) (HCC)        Past Surgical History:   Procedure Laterality Date    BOWEL RESECTION      COLONOSCOPY      HERNIA REPAIR      X2    IR AORTAGRAM WITH RUN-OFF  5/29/2020    SEVERAL    IR AORTAGRAM WITH RUN-OFF  10/9/2020    IR LOWER EXTREMITY ANGIOGRAM  11/4/2020    [place] CYSTOURETHROSCOPY,FULGUR 2-5 CM LESN N/A 1/15/2021    Procedure: CYSTOSCOPY, TRANSURETHRAL RESECTION OF BLADDER TUMOR (TURBT);   Surgeon: Tricia Francisco MD;  Location: [address] [place] [address];  Service: Urology    [place] Yogi Kapadia 3RD+ ORD SLCTV ABDL PEL/[facility] Left 10/9/2020    Procedure: ARTERIOGRAM Left lower extremity agram right femoral access with SFA stenting/angioplasty;  Surgeon: Prosper Simmons MD;  [facility];  Service: Vascular    [place] Yogi Bravos 3RD+ ORD SLCTV ABDL PEL/[facility] Right 11/4/2020    Procedure: ARTERIOGRAM RLE angiogram L fem access;  Surgeon: Cory Barcenas MD;  [facility];  Service: Vascular    [place] TRANSURETHRAL ELEC-SURG PROSTATECTOM N/A 3/2/2021    Procedure: CYSTOSCOPY, TRANSURETHRAL RESECTION OF Janice Found;  Surgeon: Tricia Francisco MD;  Location: [address] [place] [address];  Service: Urology    PROSTATE SURGERY      REMOVAL VENOUS PORT (PORT-A-CATH)         Family History   Problem Relation Age of Onset    Alzheimer's disease Mother     Cancer Father        Social History     Socioeconomic History    Marital status: Single     Spouse name: Not on file    Number of children: Not on file    Years of education: Not on file    Highest education level: Not on file   Occupational History    Not on file   Social Needs    Financial resource strain: Not on file    Food insecurity     Worry: Not on file     Inability: Not on file    Transportation needs     Medical: Not on file     Non-medical: Not on file   Tobacco Use    Smoking status: Former Smoker     Years: 30      Types: Cigarettes     Quit date: 1988     Years since quittin 2    Smokeless tobacco: Never Used   Substance and Sexual Activity    Alcohol use: Yes     Comment: SOCIAL    Drug use: Never    Sexual activity: Never   Lifestyle    Physical activity     Days per week: Not on file     Minutes per session: Not on file    Stress: Not on file   Relationships    Social connections     Talks on phone: Not on file     Gets together: Not on file     Attends Yazidi service: Not on file     Active member of club or organization: Not on file     Attends meetings of clubs or organizations: Not on file     Relationship status: Not on file    Intimate partner violence     Fear of current or ex partner: Not on file     Emotionally abused: Not on file     Physically abused: Not on file     Forced sexual activity: Not on file   Other Topics Concern    Not on file   Social History Narrative    Not on file       Allergies   Allergen Reactions    Sulfa Antibiotics Shortness Of Breath     Other reaction(s): Other (See Comments)  SOB      Protamine      Other reaction(s):  Other (See Comments)  Did not work for patient         Review of Systems  Review of Systems - History obtained from chart review and the patient  General ROS: negative for - chills or fever  Respiratory ROS: no cough, shortness of breath, or wheezing  Cardiovascular ROS: no chest pain or dyspnea on exertion  Gastrointestinal ROS: no abdominal pain, change in bowel habits, or black or bloody stools  Genito-Urinary ROS: positive for - hematuria  negative for - dysuria, pelvic pain or scrotal mass/pain  Neurological ROS: no TIA or stroke symptoms       Chart Review   Allergies, current medications, history, problem list    Vital Signs  BP 99/53   Pulse 76   Temp 97 9 °F (36 6 °C) (Oral)   Resp 18   Wt 73 kg (161 lb)   SpO2 100%   BMI 23 78 kg/m²     Physical Exam  General appearance: alert and oriented, in no acute distress, appears stated age, cooperative and no distress  Head: Normocephalic, without obvious abnormality, atraumatic  Neck: no adenopathy, no carotid bruit, no JVD, supple, symmetrical, trachea midline and thyroid not enlarged, symmetric, no tenderness/mass/nodules  Lungs: diminished breath sounds  Heart: regular rate and rhythm, S1, S2 normal, no murmur, click, rub or gallop  Abdomen: soft, non-tender; bowel sounds normal; no masses,  no organomegaly  Extremities: extremities normal, warm and well-perfused; no cyanosis, clubbing, or edema  Pulses: 2+ and symmetric  Neurologic: Grossly normal  Genitalia--uncircumcised male with fully retractable foreskin revealing normal glans penis midline urethra with very modest urethral erosion  Normal penile shaft, bilaterally descended testicles, nontender on palpation  No appreciable masses or lesions  Scrotal sac intact without edema, erythema, crepitus, necrosis, abrasion, laceration or seepage of fluid purulence or otherwise       Laboratory Studies  Lab Results   Component Value Date    HGBA1C 4 7 11/13/2020    K 2 9 (L) 04/16/2021     04/16/2021    CO2 28 04/16/2021    CREATININE 0 86 04/16/2021    BUN 22 04/16/2021     Lab Results   Component Value Date    WBC 5 97 04/16/2021    RBC 2 34 (L) 04/16/2021    HGB 7 7 (L) 04/16/2021    HCT 23 1 (L) 04/16/2021    MCV 99 (H) 04/16/2021    MCH 32 9 04/16/2021    RDW 15 0 04/16/2021     (L) 04/16/2021       Imaging and Other Studies  )No results found        Medications   Current Facility-Administered Medications   Medication Dose Route Frequency Provider Last Rate    acetaminophen  650 mg Oral Q6H PRN Dex Dhaliwal PA-C      atorvastatin  40 mg Oral Daily With "[employer]DEANA      cefepime  2,000 mg Intravenous Once Dex Dhaliwal PA-C      cefepime  2,000 mg Intravenous Q12H Thanh Shahid PA-C      finasteride  5 mg Oral Daily Thanh Shahid PA-C      hydrochlorothiazide  25 mg Oral Daily Thanh Shahid PA-C      metoprolol tartrate  25 mg Oral Daily DEANA Harrington, [address] [place] [address]

## 2021-04-16 NOTE — CONSULTS
I have personally seen and examined the patient on April 16, 2021  He previously underwent TUR P with Dr Sammy Galloway  This was performed in March 2021  Pathology revealed 6 g of benign tissue  He is now transferred from [facility] to [facility] for cystoscopy with clot evacuation and possible repeat TUR  Risk and benefits of the procedure discussed and reviewed informed consent obtained  Dr Megan Lim  Patient Name: Ct Webb  Patient MRN: [id number]  Date of Service: 4/16/2021   Date / Time Note Created: 4/16/2021 10:53 AM          Referring Provider: Fermin Mitchell MD  Provider Creating Note: YELITZA Hess                    PCP: Filemon Gaviria  Attending Provider:  Fermin Mitchell MD      Daughter Tenzin Gilman [phone]     Reason for Consult: Hematuria     HPI --Ct Webb is an 59-year-old male known to Dr harmony Galloway for history of BPH, refractory gross hematuria, while anticoagulated on Xarelto, status post recent TURP/TURBT  Previous to this he made visits to [facility] for retention and gross hematuria with multiple catheters in and out  Per primary urologist, patient has moderate prostatic obstruction with severe bladder trabeculation with resection of approximately 2 cm bladder mass on the left trigone/posterior wall  His catheter was removed at his urologist office 3/8  He was seen again 4/7 by primary urologist for gross hematuria despite recent procedures  Dr Sammy Galloway briefly discussed prostatic artery embolization in office with patient  He re-presented to office 4/15  yesterday with persistent gross hematuria  He was advised to hold blood thinners, hydrate and drain bladder 3 times a day  Of note, patient can not self perform CIC  Dr Sammy Galloway recommended cystoscopy in 6 weeks and cardiologist follow-up to determine if patient would be appropriate candidate for Watchman procedure    Patient presented with urethral bleeding  He was afebrile, hemodynamically stable and without severe complaints of flank, abdominal or suprapubic pain  He developed some urinary retention and multiple attempts at catheter placement were finally successful to put insert three-way catheter  Continuous bladder irrigation was initiated by emergency room staff  However, catheter later obstructed in the middle of the night  Catheter again was exchanged and later obstructed in the early morning hours  Patient managed to void intermittently and remained comfortable  Bedside urinal contain very dark wine-colored urine with clot debris/sediment at bottom of container  Objectively, renal function is within normal limits  However, hemoglobin decreased noted from presenting 9 2 to 7 7 this a m  [facility] Patient was admitted overnight to the Internal Medicine service prior to loss of bladder access  The medicine team have ordered transfusion of packed cells in the interim    Urinalysis is positive for nitrites without wbc's      Urologic consultation requested for further management recommendations      Source:the patient            Active Problems:        Patient Active Problem List   Diagnosis    Abdominal aortic aneurysm (AAA) without rupture (HCC)    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)    Malignant neoplasm of upper lobe of right lung (HCC)    Atrial fibrillation (HCC)    Benign essential hypertension    Hemangioma of liver    Mixed hyperlipidemia    Venous insufficiency    Asymptomatic bilateral carotid artery stenosis    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 Prostatic artery embolization discussed  Hematuria exacerbated by requisite anticoagulation      Recommendations  Patient is admitted to Medicine  Appreciate medical optimization  Monitor H&H, transfuse as needed per primary medical team   Patient has failed multiple attempts by emergency room staff and personally via cystoscopic approach for catheter insertion  Fortunately, patient has voided intermittently and is not in extremis  CT of the abdomen and pelvis with and without contrast for further assessment of  tract and to determine degree of clot burden  Keep patient NPO  If patient requires cystoscopy clot evac, will necessitate transfer to [facility]  Will update plan contingent on test results and      Medical History        Past Medical History:   Diagnosis Date    AAA (abdominal aortic aneurysm) (HCC)      Atrial fibrillation ([facility] )      Cancer ([facility] [address] [facility] ) 2014     lung ca- right lung  stage 3     Carotid artery disease (HCC)      Hyperlipidemia      Hypertension      PAD (peripheral artery disease) (HCC)      Pneumonia      PVD (peripheral vascular disease) (HCC)             Surgical History         Past Surgical History:   Procedure Laterality Date    BOWEL RESECTION        COLONOSCOPY        HERNIA REPAIR         X2    IR AORTAGRAM WITH RUN-OFF   5/29/2020     SEVERAL    IR AORTAGRAM WITH RUN-OFF   10/9/2020    IR LOWER EXTREMITY ANGIOGRAM   11/4/2020    [place] CYSTOURETHROSCOPY,FULGUR 2-5 CM DAIJA N/A 1/15/2021     Procedure: CYSTOSCOPY, TRANSURETHRAL RESECTION OF BLADDER TUMOR (TURBT);   Surgeon: Car Suarez MD;  [facility];  Service: Urology    [place] [address] 3RD+ ORD SLCTV ABDL PEL/LXTR [facility] Left 10/9/2020     Procedure: ARTERIOGRAM Left lower extremity agram right femoral access with SFA stenting/angioplasty;  Surgeon: Aldo Boateng MD;  [facility];  Service: Vascular    [place] Alyssa Wolf 3RD+ ORD SLCTV ABDL PEL/LXTR [facility] Right 2020     Procedure: ARTERIOGRAM RLE angiogram L fem access;  Surgeon: Aldo Boateng MD;  [facility];  Service: Vascular    [place] TRANSURETHRAL ELEC-SURG PROSTATECTOM N/A 3/2/2021     Procedure: CYSTOSCOPY, TRANSURETHRAL RESECTION OF Marzella Torres;  Surgeon: Linda Davis MD;  [facility]: [facility];  Service: Urology    PROSTATE SURGERY        REMOVAL VENOUS PORT (PORT-A-CATH)                     Family History   Problem Relation Age of Onset    Alzheimer's disease Mother      Cancer Father           Social History               Socioeconomic History    Marital status: Single       Spouse name: Not on file    Number of children: Not on file    Years of education: Not on file    Highest education level: Not on file   Occupational History    Not on file   Social Needs    Financial resource strain: Not on file    Food insecurity       Worry: Not on file       Inability: Not on file    Transportation needs       Medical: Not on file       Non-medical: Not on file   Tobacco Use    Smoking status: Former Smoker       Years: 30 00       Types: Cigarettes       Quit date: 1988       Years since quittin 2    Smokeless tobacco: Never Used   Substance and Sexual Activity    Alcohol use:  Yes       Comment: SOCIAL    Drug use: Never    Sexual activity: Never   Lifestyle    Physical activity       Days per week: Not on file       Minutes per session: Not on file    Stress: Not on file   Relationships    Social connections       Talks on phone: Not on file       Gets together: Not on file       Attends [employer] service: Not on file       Active member of club or organization: Not on file       Attends meetings of clubs or organizations: Not on file       Relationship status: Not on file    Intimate partner violence       Fear of current or ex partner: Not on file       Emotionally abused: Not on file       Physically abused: Not on file       Forced sexual activity: Not on file   Other Topics Concern    Not on file   Social History Narrative    Not on file                 Allergies   Allergen Reactions    Sulfa Antibiotics Shortness Of Breath       Other reaction(s): Other (See Comments)  SOB       Protamine         Other reaction(s):  Other (See Comments)  Did not work for patient            Review of Systems  Review of Systems - History obtained from chart review and the patient  General ROS: negative for - chills or fever  Respiratory ROS: no cough, shortness of breath, or wheezing  Cardiovascular ROS: no chest pain or dyspnea on exertion  Gastrointestinal ROS: no abdominal pain, change in bowel habits, or black or bloody stools  Genito-Urinary ROS: positive for - hematuria  negative for - dysuria, pelvic pain or scrotal mass/pain  Neurological ROS: no TIA or stroke symptoms                   Chart Review   Allergies, current medications, history, problem list     Vital Signs  BP 99/53   Pulse 76   Temp 97 9 °F (36 6 °C) (Oral)   Resp 18   Wt 73 kg (161 lb)   SpO2 100%   BMI 23 78 kg/m²      Physical Exam  General appearance: alert and oriented, in no acute distress, appears stated age, cooperative and no distress  Head: Normocephalic, without obvious abnormality, atraumatic  Neck: no adenopathy, no carotid bruit, no JVD, supple, symmetrical, trachea midline and thyroid not enlarged, symmetric, no tenderness/mass/nodules  Lungs: diminished breath sounds  Heart: regular rate and rhythm, S1, S2 normal, no murmur, click, rub or gallop  Abdomen: soft, non-tender; bowel sounds normal; no masses,  no organomegaly  Extremities: extremities normal, warm and well-perfused; no cyanosis, clubbing, or edema  Pulses: 2+ and symmetric  Neurologic: Grossly normal  Genitalia--uncircumcised male with fully retractable foreskin revealing normal glans penis midline urethra with very modest urethral erosion  Normal penile shaft, bilaterally descended testicles, nontender on palpation  No appreciable masses or lesions  Scrotal sac intact without edema, erythema, crepitus, necrosis, abrasion, laceration or seepage of fluid purulence or otherwise                  Laboratory Studies        Lab Results   Component Value Date     HGBA1C 4 7 11/13/2020     K 2 9 (L) 04/16/2021      04/16/2021     CO2 28 04/16/2021     CREATININE 0 86 04/16/2021     BUN 22 04/16/2021            Lab Results   Component Value Date     WBC 5 97 04/16/2021     RBC 2 34 (L) 04/16/2021     HGB 7 7 (L) 04/16/2021     HCT 23 1 (L) 04/16/2021     MCV 99 (H) 04/16/2021     MCH 32 9 04/16/2021     RDW 15 0 04/16/2021      (L) 04/16/2021         Imaging and Other Studies  )No results found         Medications            Current Facility-Administered Medications   Medication Dose Route Frequency Provider Last Rate    acetaminophen  650 mg Oral Q6H PRN Thanh Shahid PA-C      atorvastatin  40 mg Oral Daily With [employer]      cefepime  2,000 mg Intravenous Once Thanh Shahid PA-C      cefepime  2,000 mg Intravenous Q12H Thanh Shahid PA-C      finasteride  5 mg Oral Daily Thanh Shahid PA-C      hydrochlorothiazide  25 mg Oral Daily Thanh Shahid PA-C      metoprolol tartrate  25 mg Oral Daily Anderson Weiss CRNP      Revision History

## 2021-04-16 NOTE — ED NOTES
Partner/Significant other will be in to see patient  Charito Woody called for an update        Iraj Broderick  04/16/21 [id number]

## 2021-04-16 NOTE — ASSESSMENT & PLAN NOTE
· UA with positive nitrities, no WBC or leucs  · Started on IV cefepime, cont that  · Cultures not sent

## 2021-04-16 NOTE — ASSESSMENT & PLAN NOTE
· Status post TURP by Dr Compa Grace on 3/2/2021  Noticed hematuria with clots today  · ED provider discuss case with on-call Urology recommending observation  · Patient will be started on CBI  · Will consult urology    · Monitor hemoglobin, now 9 2 baseline around 12  · Will hold Xarelto in the setting of hematuria

## 2021-04-16 NOTE — ANESTHESIA POSTPROCEDURE EVALUATION
Post-Op Assessment Note    CV Status:  Stable    Pain management: adequate     Mental Status:  Awake and sleepy (responds to name and questioning)   Hydration Status:  Euvolemic   PONV Controlled:  Controlled   Airway Patency:  Patent      Post Op Vitals Reviewed: Yes      Staff: Anesthesiologist, CRNA   Comments: VSS, reflexes intact, maintains own airway        No complications documented      /71 (04/16/21 1737)    Temp 97 5 °F (36 4 °C) (04/16/21 1737)    Pulse 79 (04/16/21 1737)   Resp 16 (04/16/21 1737)    SpO2 100 % (04/16/21 1737)

## 2021-04-16 NOTE — ASSESSMENT & PLAN NOTE
· Continue metoprolol tartarate 25 QD  · Holding xarelto d/t hematuria  · F/u with his cardiologist about considering watchman device

## 2021-04-16 NOTE — H&P
[facility]  H&P- Dominique Nash 1934, 80 y o  male MRN: [id number]  Unit/Bed#: ED 12 Encounter: [id number]  Primary Care Provider: Sergio Meyer MD   Date and time admitted to hospital: 4/15/2021  9:49 PM    * Hematuria  Assessment & Plan  · Status post TURP by Dr Marisol Painting on 3/2/2021  W/ subsequent polyp removal yesterday, however no notes about this  Noticed hematuria with clots today  · ED provider discuss case with on-call Urology recommending observation  · Patient will be started on CBI  · Will consult urology  · Monitor hemoglobin, now 9 2 baseline around 12  · Will hold Xarelto in the setting of hematuria    UTI (urinary tract infection)  Assessment & Plan  · With nitrite positive urine  · Will cover patient with cefepime given recent urologic instrumentation  · Follow urine cultures    Atrial fibrillation (HCC)  Assessment & Plan  · Rates controlled  · Anticoagulated with Xarelto  · This will be held given hematuria  · Rate control with metoprolol    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)  Assessment & Plan  · S/p turp by Dr Marisol Painting of urology   · Urology follow up     Abdominal aortic aneurysm (AAA) without rupture [facility])  Assessment & Plan  · Follows with outpatient vascular surgery  · December 2020 with dimensions of 4 8  · Q 6 month follow-up  · Outpatient follow-up    Malignant neoplasm of upper lobe of right lung (HCC)  Assessment & Plan  · MARLENA NSCLC    From prior notes, patient has not gotten this biopsied yet  · Follows with [facility] in the outpatient setting  · Follow up outpatient       VTE Prophylaxis: Pharmacologic VTE Prophylaxis contraindicated due to hematuria   / sequential compression device   Code Status: full   POLST: There is no POLST form on file for this patient (pre-hospital)  Discussion with family: patient with son in the room     Anticipated Length of Stay:  Patient will be admitted on an Inpatient basis with an anticipated length of stay of  > 2 midnights  Justification for Hospital Stay: hematuria  Patient initially made inpatient, however will change to observation as a believe he will be less than 2 nights  Total Time for Visit, including Counseling / Coordination of Care: 1 hour  Greater than 50% of this total time spent on direct patient counseling and coordination of care  Chief Complaint:   Hematuria     History of Present Illness:    Renetta Browne is a 80 y o  male who presents with hematuria  Past medical history of AAA, Lung cancer under observation, prostate cancer  Presents with hematuria  Patient states that he had hematuria ever since his TURP procedure done last month, however this improved gradually  Patient states that he had worsening hematuria today after undergoing a polyp removal procedure yesterday  There is no notes to back of his polyp removal procedure however  Patient states that he has been feeling slightly lightheaded especially with standing and ambulation  Noted hemoglobin dropped from baseline of around 12 to approximately 9 2  Patient states that he has also been passing clots  He denies any other urologic symptoms at this time   Review of Systems:    Review of Systems   Constitutional: Negative for chills, fatigue, fever and unexpected weight change  Respiratory: Negative for cough, chest tightness and shortness of breath  Cardiovascular: Negative for chest pain and palpitations  Gastrointestinal: Negative for abdominal pain, diarrhea, nausea and vomiting  Genitourinary: Positive for hematuria  Negative for decreased urine volume, dysuria, frequency, penile pain, penile swelling and urgency  Musculoskeletal: Negative for arthralgias  Neurological: Negative for dizziness, syncope, light-headedness and headaches  All other systems reviewed and are negative        Past Medical and Surgical History:     Past Medical History:   Diagnosis Date    [facility]     Atrial fibrillation ([facility] )     [facility]    lung ca- right lung  stage 3     Carotid artery disease (HCC)     Hyperlipidemia     Hypertension     PAD (peripheral artery disease) (HCC)     Pneumonia     PVD (peripheral vascular disease) (HCC)        Past Surgical History:   Procedure Laterality Date    BOWEL RESECTION      COLONOSCOPY      HERNIA REPAIR      X2    IR AORTAGRAM WITH RUN-OFF  5/29/2020    SEVERAL    IR AORTAGRAM WITH RUN-OFF  10/9/2020    IR LOWER EXTREMITY ANGIOGRAM  11/4/2020    UT CYSTOURETHROSCOPY,FULGUR 2-5 CM LESN N/A 1/15/2021    Procedure: CYSTOSCOPY, TRANSURETHRAL RESECTION OF BLADDER TUMOR (TURBT); Surgeon: Jose Alfredo Robles MD;  Location: [address] [place] [address];  Service: Urology    [facility] SL[facility]V ABDL PEL/LXTR [facility] Left 10/9/2020    Procedure: ARTERIOGRAM Left lower extremity agram right femoral access with SFA stenting/angioplasty;  Surgeon: Abdullahi Simmons MD;  [facility];  Service: Vascular    UT [place] 3RD+ ORD SLCTV ABDL PEL/LXTR [facility] Right 11/4/2020    Procedure: ARTERIOGRAM RLE angiogram L fem access;  Surgeon: Moisés Page MD;  [facility]: [facility];  Service: Vascular    UT TRANSURETHRAL ELEC-SURG PROSTATECTOM N/A 3/2/2021    Procedure: CYSTOSCOPY, TRANSURETHRAL RESECTION OF Marcel Baig;  Surgeon: Jose Alfredo Robles MD;  Location: [address] [place] [address];  Service: Urology    PROSTATE SURGERY      REMOVAL VENOUS PORT (PORT-A-CATH)         Meds/Allergies:    Prior to Admission medications    Medication Sig Start Date End Date Taking?  Authorizing Provider   finasteride (PROSCAR) 5 mg tablet Take 5 mg by mouth daily    Historical Provider, MD   hydrochlorothiazide (HYDRODIURIL) 25 mg tablet Take 25 mg by mouth daily    Historical Provider, MD   metoprolol tartrate (LOPRESSOR) 25 mg tablet Take 25 mg by mouth daily     Historical Provider, MD   rivaroxaban (Xarelto) 10 mg tablet Take 10 mg by mouth daily at bedtime To check with surgeon when to hold RX  Pt advised to stop after 21    Historical Provider, MD   rosuvastatin (CRESTOR) 20 MG tablet Take 1 tablet (20 mg total) by mouth daily 2/15/21   Karla Haider Doctor, MD     I have reviewed home medications with patient personally  Allergies: Allergies   Allergen Reactions    Sulfa Antibiotics Shortness Of Breath     Other reaction(s): Other (See Comments)  SOB      Protamine      Other reaction(s): Other (See Comments)  Did not work for patient         Social History:     Marital Status: Single   Occupation: unknown   Patient Pre-hospital Living Situation: lives at home   Patient Pre-hospital Level of Mobility: ambulates   Patient Pre-hospital Diet Restrictions: npo   Substance Use History:   Social History     Substance and Sexual Activity   Alcohol Use Yes    Comment: SOCIAL     Social History     Tobacco Use   Smoking Status Former Smoker    Years: 30 00    Types: Cigarettes    Quit date: 1988    Years since quittin 2   Smokeless Tobacco Never Used     Social History     Substance and Sexual Activity   Drug Use Never       Family History:    Family History   Problem Relation Age of Onset    Alzheimer's disease Mother     Cancer Father        Physical Exam:     Vitals:   Blood Pressure: 108/57 (04/15/21 2338)  Pulse: 56 (04/15/21 2338)  Temperature: 97 6 °F (36 4 °C) (04/15/21 2126)  Temp Source: Oral (04/15/21 2126)  Respirations: 18 (04/15/21 2338)  Weight - Scale: 73 kg (161 lb) (04/15/21 2129)  SpO2: 96 % (04/15/21 2338)    Physical Exam  Constitutional:       General: He is not in acute distress  HENT:      Head: Normocephalic and atraumatic  Mouth/Throat:      Mouth: Mucous membranes are moist       Pharynx: Oropharynx is clear  No oropharyngeal exudate  Eyes:      General:         Right eye: No discharge  Left eye: No discharge  Conjunctiva/sclera: Conjunctivae normal       Pupils: Pupils are equal, round, and reactive to light     Neck: Musculoskeletal: Neck supple  No muscular tenderness  Cardiovascular:      Rate and Rhythm: Normal rate and regular rhythm  Pulses: Normal pulses  Heart sounds: Normal heart sounds  No murmur  Pulmonary:      Effort: Pulmonary effort is normal  No respiratory distress  Breath sounds: Normal breath sounds  No wheezing or rales  Abdominal:      General: Abdomen is flat  There is no distension  Palpations: Abdomen is soft  Tenderness: There is no abdominal tenderness  Genitourinary:     Penis: Normal        Scrotum/Testes: Normal       Comments: Urinal with dark red blood next to bed  Musculoskeletal: Normal range of motion  Right lower leg: No edema  Left lower leg: No edema  Skin:     General: Skin is warm and dry  Capillary Refill: Capillary refill takes less than 2 seconds  Coloration: Skin is not jaundiced  Neurological:      General: No focal deficit present  Mental Status: He is alert and oriented to person, place, and time  Cranial Nerves: No cranial nerve deficit  Psychiatric:         Mood and Affect: Mood normal            Additional Data:     Lab Results: I have personally reviewed pertinent reports  Results from last 7 days   Lab Units 04/15/21  2151   WBC Thousand/uL 6 82   HEMOGLOBIN g/dL 9 2*   HEMATOCRIT % 27 5*   PLATELETS Thousands/uL 167   NEUTROS PCT % 75   LYMPHS PCT % 13*   MONOS PCT % 11   EOS PCT % 1     Results from last 7 days   Lab Units 04/15/21  2151   SODIUM mmol/L 140   POTASSIUM mmol/L 3 4*   CHLORIDE mmol/L 97*   CO2 mmol/L 29   BUN mg/dL 24   CREATININE mg/dL 1 26   ANION GAP mmol/L 14*   CALCIUM mg/dL 8 7   ALBUMIN g/dL 3 1*   TOTAL BILIRUBIN mg/dL 0 18*   ALK PHOS U/L 76   ALT U/L 27   AST U/L 23   GLUCOSE RANDOM mg/dL 129     Results from last 7 days   Lab Units 04/15/21  2151   INR  1 26*                   Imaging: I have personally reviewed pertinent reports        No orders to display       EKG, Pathology, and Other Studies Reviewed on Admission:   · All reports viewed in epic     Allscripts / Trello Records Reviewed: Yes     ** Please Note: This note has been constructed using a voice recognition system   **

## 2021-04-16 NOTE — ED NOTES
Spoke with blood bank regarding transfusion, awaiting transfusion order     Keila Moffett RN  04/16/21 2675

## 2021-04-17 PROBLEM — E87.6 HYPOKALEMIA: Status: ACTIVE | Noted: 2021-01-01

## 2021-04-17 PROBLEM — D69.6 THROMBOCYTOPENIA (HCC): Status: ACTIVE | Noted: 2021-01-01

## 2021-04-17 PROBLEM — R39.89 SUSPECTED UTI: Status: ACTIVE | Noted: 2021-01-01

## 2021-04-17 NOTE — ASSESSMENT & PLAN NOTE
Rate controlled on Lopressor  Holding anticoagulation (Xarelto) in the setting of gross hematuria - recommend follow-up with native cardiologist about considering Watchman device

## 2021-04-17 NOTE — ASSESSMENT & PLAN NOTE
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

## 2021-04-17 NOTE — PROGRESS NOTES
Called to room by patient concerning his amount of output in this valadez bag, patient feels like he isnt outputting enough urine since CBI was turned off around 0845  Spoke with Dr Henok Armstrong who will order a one time order to flush catheter to ensure no clots  Also bladderscanned patient several times with two separate scanners, multiple reading of zero  Patient would like doctor contacted    Dr Sada Tabares aware and will call patients room to speak to him

## 2021-04-17 NOTE — PLAN OF CARE
Problem: PAIN - ADULT  Goal: Verbalizes/displays adequate comfort level or baseline comfort level  Description: Interventions:  - Encourage patient to monitor pain and request assistance  - Assess pain using appropriate pain scale  - Administer analgesics based on type and severity of pain and evaluate response  - Implement non-pharmacological measures as appropriate and evaluate response  - Consider cultural and social influences on pain and pain management  - Notify physician/advanced practitioner if interventions unsuccessful or patient reports new pain  Outcome: Progressing     Problem: INFECTION - ADULT  Goal: Absence or prevention of progression during hospitalization  Description: INTERVENTIONS:  - Assess and monitor for signs and symptoms of infection  - Monitor lab/diagnostic results  - Monitor all insertion sites, i e  indwelling lines, tubes, and drains  - Monitor endotracheal if appropriate and nasal secretions for changes in amount and color  - [place] appropriate cooling/warming therapies per order  - Administer medications as ordered  - Instruct and encourage patient and family to use good hand hygiene technique  - Identify and instruct in appropriate isolation precautions for identified infection/condition  Outcome: Progressing  Goal: Absence of fever/infection during neutropenic period  Description: INTERVENTIONS:  - Monitor WBC    Outcome: Progressing     Problem: SAFETY ADULT  Goal: Patient will remain free of falls  Description: INTERVENTIONS:  - Assess patient frequently for physical needs  -  Identify cognitive and physical deficits and behaviors that affect risk of falls    -  [place] fall precautions as indicated by assessment   - Educate patient/family on patient safety including physical limitations  - Instruct patient to call for assistance with activity based on assessment  - Modify environment to reduce risk of injury  - Consider OT/PT consult to assist with strengthening/mobility  Outcome: Progressing  Goal: Maintain or return to baseline ADL function  Description: INTERVENTIONS:  -  Assess patient's ability to carry out ADLs; assess patient's baseline for ADL function and identify physical deficits which impact ability to perform ADLs (bathing, care of mouth/teeth, toileting, grooming, dressing, etc )  - Assess/evaluate cause of self-care deficits   - Assess range of motion  - Assess patient's mobility; develop plan if impaired  - Assess patient's need for assistive devices and provide as appropriate  - Encourage maximum independence but intervene and supervise when necessary  - Involve family in performance of ADLs  - Assess for home care needs following discharge   - Consider OT consult to assist with ADL evaluation and planning for discharge  - Provide patient education as appropriate  Outcome: Progressing  Goal: Maintain or return mobility status to optimal level  Description: INTERVENTIONS:  - Assess patient's baseline mobility status (ambulation, transfers, stairs, etc )    - Identify cognitive and physical deficits and behaviors that affect mobility  - Identify mobility aids required to assist with transfers and/or ambulation (gait belt, sit-to-stand, lift, walker, cane, etc )  - [place] fall precautions as indicated by assessment  - Record patient progress and toleration of activity level on Mobility SBAR; progress patient to next Phase/Stage  - Instruct patient to call for assistance with activity based on assessment  - Consider rehabilitation consult to assist with strengthening/weightbearing, etc   Outcome: Progressing     Problem: DISCHARGE PLANNING  Goal: Discharge to home or other facility with appropriate resources  Description: INTERVENTIONS:  - Identify barriers to discharge w/patient and caregiver  - Arrange for needed discharge resources and transportation as appropriate  - Identify discharge learning needs (meds, wound care, etc )  - Arrange for interpretive services to assist at discharge as needed  - Refer to Case Management Department for coordinating discharge planning if the patient needs post-hospital services based on physician/advanced practitioner order or complex needs related to functional status, cognitive ability, or social support system  Outcome: Progressing     Problem: GENITOURINARY - ADULT  Goal: Maintains or returns to baseline urinary function  Description: INTERVENTIONS:  - Assess urinary function  - Encourage oral fluids to ensure adequate hydration if ordered  - Administer IV fluids as ordered to ensure adequate hydration  - Administer ordered medications as needed  - Offer frequent toileting  - Follow urinary retention protocol if ordered  Outcome: Progressing  Goal: Absence of urinary retention  Description: INTERVENTIONS:  - Assess patients ability to void and empty bladder  - Monitor I/O  - Bladder scan as needed  - Discuss with physician/AP medications to alleviate retention as needed  - Discuss catheterization for long term situations as appropriate  Outcome: Progressing  Goal: Urinary catheter remains patent  Description: INTERVENTIONS:  - Assess patency of urinary catheter  - If patient has a chronic valadez, consider changing catheter if non-functioning  - Follow guidelines for intermittent irrigation of non-functioning urinary catheter  Outcome: Progressing

## 2021-04-17 NOTE — ASSESSMENT & PLAN NOTE
Creatinine improved from 1 26 -> 0 76  Monitor renal function and urine output - limit/avoid nephrotoxins as possible  Continue IV fluids

## 2021-04-17 NOTE — PLAN OF CARE
Problem: PAIN - ADULT  Goal: Verbalizes/displays adequate comfort level or baseline comfort level  Description: Interventions:  - Encourage patient to monitor pain and request assistance  - Assess pain using appropriate pain scale  - Administer analgesics based on type and severity of pain and evaluate response  - Implement non-pharmacological measures as appropriate and evaluate response  - Consider cultural and social influences on pain and pain management  - Notify physician/advanced practitioner if interventions unsuccessful or patient reports new pain  Outcome: Progressing     Problem: INFECTION - ADULT  Goal: Absence or prevention of progression during hospitalization  Description: INTERVENTIONS:  - Assess and monitor for signs and symptoms of infection  - Monitor lab/diagnostic results  - Monitor all insertion sites, i e  indwelling lines, tubes, and drains  - Monitor endotracheal if appropriate and nasal secretions for changes in amount and color  - [place] appropriate cooling/warming therapies per order  - Administer medications as ordered  - Instruct and encourage patient and family to use good hand hygiene technique  - Identify and instruct in appropriate isolation precautions for identified infection/condition  Outcome: Progressing  Goal: Absence of fever/infection during neutropenic period  Description: INTERVENTIONS:  - Monitor WBC    Outcome: Progressing     Problem: SAFETY ADULT  Goal: Patient will remain free of falls  Description: INTERVENTIONS:  - Assess patient frequently for physical needs  -  Identify cognitive and physical deficits and behaviors that affect risk of falls    -  [place] fall precautions as indicated by assessment   - Educate patient/family on patient safety including physical limitations  - Instruct patient to call for assistance with activity based on assessment  - Modify environment to reduce risk of injury  - Consider OT/PT consult to assist with strengthening/mobility  Outcome: Progressing  Goal: Maintain or return to baseline ADL function  Description: INTERVENTIONS:  -  Assess patient's ability to carry out ADLs; assess patient's baseline for ADL function and identify physical deficits which impact ability to perform ADLs (bathing, care of mouth/teeth, toileting, grooming, dressing, etc )  - Assess/evaluate cause of self-care deficits   - Assess range of motion  - Assess patient's mobility; develop plan if impaired  - Assess patient's need for assistive devices and provide as appropriate  - Encourage maximum independence but intervene and supervise when necessary  - Involve family in performance of ADLs  - Assess for home care needs following discharge   - Consider OT consult to assist with ADL evaluation and planning for discharge  - Provide patient education as appropriate  Outcome: Progressing  Goal: Maintain or return mobility status to optimal level  Description: INTERVENTIONS:  - Assess patient's baseline mobility status (ambulation, transfers, stairs, etc )    - Identify cognitive and physical deficits and behaviors that affect mobility  - Identify mobility aids required to assist with transfers and/or ambulation (gait belt, sit-to-stand, lift, walker, cane, etc )  - [place] fall precautions as indicated by assessment  - Record patient progress and toleration of activity level on Mobility SBAR; progress patient to next Phase/Stage  - Instruct patient to call for assistance with activity based on assessment  - Consider rehabilitation consult to assist with strengthening/weightbearing, etc   Outcome: Progressing     Problem: DISCHARGE PLANNING  Goal: Discharge to home or other facility with appropriate resources  Description: INTERVENTIONS:  - Identify barriers to discharge w/patient and caregiver  - Arrange for needed discharge resources and transportation as appropriate  - Identify discharge learning needs (meds, wound care, etc )  - Arrange for interpretive services to assist at discharge as needed  - Refer to Case Management Department for coordinating discharge planning if the patient needs post-hospital services based on physician/advanced practitioner order or complex needs related to functional status, cognitive ability, or social support system  Outcome: Progressing     Problem: GENITOURINARY - ADULT  Goal: Maintains or returns to baseline urinary function  Description: INTERVENTIONS:  - Assess urinary function  - Encourage oral fluids to ensure adequate hydration if ordered  - Administer IV fluids as ordered to ensure adequate hydration  - Administer ordered medications as needed  - Offer frequent toileting  - Follow urinary retention protocol if ordered  Outcome: Progressing  Goal: Absence of urinary retention  Description: INTERVENTIONS:  - Assess patients ability to void and empty bladder  - Monitor I/O  - Bladder scan as needed  - Discuss with physician/AP medications to alleviate retention as needed  - Discuss catheterization for long term situations as appropriate  Outcome: Progressing  Goal: Urinary catheter remains patent  Description: INTERVENTIONS:  - Assess patency of urinary catheter  - If patient has a chronic valadez, consider changing catheter if non-functioning  - Follow guidelines for intermittent irrigation of non-functioning urinary catheter  Outcome: Progressing

## 2021-04-17 NOTE — QUICK NOTE
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  His prostate was not the source for bleeding and the prostatic urethra was fine and open  The tumor was fulgurated to its base gently so as not to perforate the tic  There was no bleeding from the base of the mass or the prostate at the end of the procedure Tuesday  He had been off xarelto since Sunday  He restarted xarelto due to his Afib and he started bleeding again so was seen in the office 4/15/21  Given his ability to do self cath he was advised to hold xarelto again, drink fluids, and cath self 3 times a day  He was to call me if bleeding resulted in obstruction of voiding and was aware would need cystoscopy in the OR if CBI and withholding of xarelto was not successful at stopping the bleeding  It is not clear why he went to [facility]  When I discovered him on the census at [facility], I notified the APN in Urology this morning about the need for getting a catheter in and it appears he was then transferred to [facility] Dr Akanksha Estrada assistance in his care  I am available by phone or Tiger Text anytime if questions arise

## 2021-04-17 NOTE — PROGRESS NOTES
[facility]  Progress Note Zahraa Danville Saad 1934, 80 y o  male MRN: [id number]  Unit/Bed#: [facility] 917-01 Encounter: [id number]  Primary Care Provider: Kristin Adam MD   Date and time admitted to hospital: 4/16/2021  3:09 PM      * Gross hematuria  Assessment & Plan  Status post TURP/BT by Dr Cora George on 3/2/21 - recurrent episodes of hematuria over the last few weeks requiring outpatient follow-up earlier this week w/ discussion of prostatic artery embolization followed by procedure - returned on 4/15 and was advised to hold Xarelto again, increase hydration, and self catheterize as possible due to persistence of hematuria (was advised to contact needed urologist if recurrence was noted)  Patient, however, reported to the [facility] [address] where he underwent fulguration and clot evacuation (operative report also noted 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) - CBI discontinued per urology recommendation earlier today w/ maintenance of Luo catheter to gravity drainage over the next 10-14 days and outpatient follow-up with Dr Cora George for a voiding trial  Continue to hold anticoagulation - on SCDs for DVT prophylaxis  Continue H/H monitoring    Acute blood loss anemia  Assessment & Plan  Due to gross hematuria (see above)  S/p PRBC transfusion  Continue H/H monitoring w/ further transfusions if necessary    TENZIN (acute kidney injury)   Assessment & Plan  Creatinine improved from 1 26 -> 0 76  Monitor renal function and urine output - limit/avoid nephrotoxins as possible  Continue IV fluids    BPH with urinary obstruction  Assessment & Plan  S/p TURP/TURBT last month  C/w Proscar  See plan for hematuria above    Suspected UTI  Assessment & Plan  Urinalysis with positive nitrites in the setting of acute medical issues  On IV Cefepime - no urine culture sent on presentation    Malignant neoplasm of upper lobe of right lung   Assessment & Plan  Outpatient follow-up with oncology @ [facility]  Supportive care    Atrial fibrillation  Assessment & Plan  Rate controlled on Lopressor  Holding anticoagulation (Xarelto) in the setting of gross hematuria - recommend follow-up with native cardiologist about considering Watchman device    Abdominal aortic aneurysm (AAA)   Assessment & Plan  Outpatient vascular surgery follow-up w/ serial screening/monitoring  Maintain blood pressure control    Hyperlipidemia  Assessment & Plan  Continue statin    Hypokalemia  Assessment & Plan  Monitor/replete serum potassium    Thrombocytopenia   Assessment & Plan  Monitor platelet count  Monitored for continued bleeding      DVT Prophylaxis:  SCDs - holding Xarelto       Patient Centered Rounds:  I have performed bedside rounds and discussed plan of care with nursing today  Discussions with Specialists or Other Care Team Provider:  see above assessments if applicable    Education and Discussions with Family / Patient:  Patient at bedside    Time Spent for Care:  32 minutes  More than 50% of total time spent on counseling and coordination of care as described above  Current Length of Stay: 1 day(s)    Current Patient Status: Inpatient   Certification Statement:  Patient will continue to require additional hospital stay due to assessments as noted above  Code Status: Level 1 - Full Code        Subjective:     Seen sitting in earlier today  Patient, at the time my encounter, did report improvement in hematuria  Denies pain at this time  Objective:     Vitals:   Temp (24hrs), Av 8 °F (36 6 °C), Min:97 °F (36 1 °C), Max:98 4 °F (36 9 °C)    Temp:  [97 °F (36 1 °C)-98 4 °F (36 9 °C)] 97 6 °F (36 4 °C)  HR:  [72-82] 82  Resp:  [15-18] 18  BP: (110-149)/(57-79) 110/57  SpO2:  [100 %] 100 %  There is no height or weight on file to calculate BMI  Input and Output Summary (last 24 hours):        Intake/Output Summary (Last 24 hours) at 2021 1641  Last data filed at 2021 1450  Gross per 24 hour   Intake 2890 ml   Output 2350 ml   Net 540 ml       Physical Exam:     GENERAL:  Weak/fatigued - no immediate distress at rest  HEAD:  Normocephalic - atraumatic  EYES: PERRL - EOMI   MOUTH:  Mucosa moist  NECK:  Supple - full range of motion  CARDIAC:  Rate controlled - S1/S2 positive  PULMONARY:  Fairly clear to auscultation - nonlabored respirations  ABDOMEN:  Soft - nontender/nondistended - active bowel sounds  MUSCULOSKELETAL:  Motor strength/range of motion deconditioned   NEUROLOGIC:  Alert/oriented at baseline  SKIN:  Chronic wrinkles/blemishes   GENITOURINARY:  Luo catheter in place draining light red/dark pink urine      Additional Data:     Labs & Recent Cultures:    Results from last 7 days   Lab Units 04/17/21  0454   WBC Thousand/uL 6 81   HEMOGLOBIN g/dL 8 7*   HEMATOCRIT % 25 7*   PLATELETS Thousands/uL 116*   NEUTROS PCT % 85*   LYMPHS PCT % 6*   MONOS PCT % 8   EOS PCT % 1     Results from last 7 days   Lab Units 04/17/21  0454  04/16/21  1715  04/15/21  2151   POTASSIUM mmol/L 3 3*   < >  --    < > 3 4*   CHLORIDE mmol/L 106   < >  --    < > 97*   CO2 mmol/L 28   < >  --    < > 29   CO2, I-STAT mmol/L  --   --  31   < >  --    BUN mg/dL 20   < >  --    < > 24   CREATININE mg/dL 0 76   < >  --    < > 1 26   CALCIUM mg/dL 8 2*   < >  --    < > 8 7   ALK PHOS U/L  --   --   --   --  76   ALT U/L  --   --   --   --  27   AST U/L  --   --   --   --  23   GLUCOSE, ISTAT mg/dl  --   --  98   < >  --     < > = values in this interval not displayed       Results from last 7 days   Lab Units 04/15/21  2151   INR  1 26*                             Last 24 Hours Medication List:   Current Facility-Administered Medications   Medication Dose Route Frequency Provider Last Rate    acetaminophen  650 mg Oral Q6H PRN Carrie Zurita MD      acetaminophen  650 mg Oral Q6H [address] Carrie Zuriat MD      atorvastatin  40 mg Oral Daily With Nicole Thurston MD      belladonna-opium  30 mg Rectal Q8H PRN Mary Ann Dominguez MD      [START ON 4/18/2021] cefepime  2,000 mg Intravenous Q12H Mary Ann Dominguez MD      dextrose 5% lactated ringer's  75 mL/hr Intravenous Continuous Mary Ann Dominguez MD 75 mL/hr (04/17/21 1629)    docusate sodium  100 mg Oral BID Mary Ann Dominguez MD      finasteride  5 mg Oral Daily Mary Ann Dominguez MD      lactated ringers  1,000 mL Intravenous Once PRN Mary Ann Dominguez MD      And    lactated ringers  1,000 mL Intravenous Once PRN Mary Ann Dominguez MD      lactated ringers  20 mL/hr Intravenous Continuous Radha Sommer MD Stopped (04/16/21 1807)    metoprolol tartrate  25 mg Oral Daily Mary Ann Dominguez MD      morphine injection  2 mg Intravenous Q2H PRN Mary Ann Dominguez MD      neomycin-bacitracin-polymyxin b  1 large application Topical BID Mary Ann Dominguez MD      ondansetron  4 mg Intravenous Q6H PRN Mary Ann Dominguez MD      oxyCODONE  2 5 mg Oral Q4H PRN Mary Ann Dominguez MD      oxyCODONE  5 mg Oral Q4H PRN Mary Ann Dominguez MD      sodium chloride  1,000 mL Intravenous Once PRN Mary Ann Dominguez MD      And    sodium chloride  1,000 mL Intravenous Once PRN Mary Ann Dominguez MD      sodium chloride  3,000 mL Irrigation Continuous Mary Ann Dominguez MD Stopped (04/17/21 7934)                  ** Please Note: This note is constructed using a voice recognition dictation system  An occasional wrong word/phrase or sound-a-like substitution may have been picked up by dictation device due to the inherent limitations of voice recognition software  Read the chart carefully and recognize, using reasonable context, where substitutions may have occurred  **

## 2021-04-17 NOTE — PROGRESS NOTES
Postoperative day 1  Status post cystoscopy with clot evacuation fulguration    A large burden of clot was identified within the patient's large diverticulum which was ultimately removed  It appears that the bleeding source may have been from the prior biopsy site within the diverticulum exacerbated by Xarelto  In addition a large ventral false passage was identified within the bulbar urethra  There were no obvious signs of cancer recurrence on cystoscopic evaluation last evening  There was no evidence of perforation or bladder injury  Overnight he has been managed with continuous bladder irrigation on CBI  /57   Pulse 80   Temp 98 3 °F (36 8 °C)   Resp 18   SpO2 100%   On examination he is in no acute distress  He is sleeping and resting comfortably  CBI is off with clear yellow urine      Impression:  History of TURP, status post TURBT for bladder cancer, history of bladder diverticulum, status post cystoscopy with clot evacuation and fulguration, 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

## 2021-04-17 NOTE — ASSESSMENT & PLAN NOTE
Due to gross hematuria (see above)  S/p PRBC transfusion  Continue H/H monitoring w/ further transfusions if necessary

## 2021-04-17 NOTE — ASSESSMENT & PLAN NOTE
Urinalysis with positive nitrites in the setting of acute medical issues  On IV Cefepime - no urine culture sent on presentation

## 2021-04-17 NOTE — ASSESSMENT & PLAN NOTE
Outpatient vascular surgery follow-up w/ serial screening/monitoring  Maintain blood pressure control

## 2021-04-18 NOTE — PROGRESS NOTES
Postoperative day 2  Status post cystoscopy with clot evacuation and fulguration  No issues reported overnight  CBI discontinued  Luo catheter draining clear per nursing staff    /55   Pulse 90   Temp (!) 97 1 °F (36 2 °C)   Resp 18   SpO2 92%     On examination he is in no acute distress    His abdomen is soft nontender nondistended  Luo catheter is in place draining clear yellow urine    Impression:  History of TURP, status post TURBT with biopsy of diverticulum, post procedure hematuria with clot retention, status post cystoscopy with clot evacuation and fulguration, large false passage of the ventral bulbar urethra, resolved acute kidney injury    Plan:  DC CBI  Maintain Luo catheter gravity drainage for discharge  Okay to resume anticoagulation from urologic standpoint  Cleared for discharge with Luo catheter from urologic standpoint  Follow up in the outpatient setting with Dr Aylin Panchal for catheter removal

## 2021-04-18 NOTE — NURSING NOTE
Patient provided discharge instructions, verbalized understanding  All personal belongings taken with patient  Belongings obtained from security  Daughter here for

## 2021-04-18 NOTE — DISCHARGE SUMMARY
Discharge Summary - [facility]  Patient: Shama Wong 80 y o  male   MRN: [id number]  PCP: Mahamed Jack MD  Unit/Bed#: [facility] 726-33 Encounter: [id number]            Discharging Physician / Practitioner: Emily Harris MD  PCP: Mahamed Jack MD  Admission Date:   Admission Orders (From admission, onward)     Ordered        04/16/21 1656  Inpatient Admission  Once                   Discharge Date: 04/18/21      Reason for Admission:   Hematuria    Discharge Diagnoses:     Principal Problem:    Gross hematuria (resolved)    Active Problems:    Acute blood loss anemia (stable)    BPH with urinary obstruction    TENZIN (acute kidney injury)     Suspected UTI    Malignant neoplasm of upper lobe of right lung     Atrial fibrillation    Abdominal aortic aneurysm (AAA)     Hyperlipidemia    Thrombocytopenia     Resolved Problems:     Hypokalemia      Consultations During Hospital Stay:  · Urology      Hospital Course:     * Gross hematuria  Assessment & Plan  Status post TURP/BT by Dr Lam on 3/2/21 - recurrent episodes of hematuria over the last few weeks requiring outpatient follow-up earlier this week w/ discussion of prostatic artery embolization followed by procedure - returned on 4/15 and was advised to hold Xarelto again, increase hydration, and self catheterize as possible due to persistence of hematuria (was advised to contact needed urologist if recurrence was noted)  Patient, however, reported to the [address] where he underwent fulguration and clot evacuation (operative report also noted 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) - CBI discontinued per urology recommendation earlier today w/ maintenance of Luo catheter to gravity drainage over the next 10-14 days and outpatient follow-up with Dr Markie Arreola for a voiding trial  Can resume Xarelto as hematuria resolved prior to discharge  H/H stable  Plan for discharge home today     Acute blood loss anemia  Assessment & Plan  Due to gross hematuria (see above)  S/p PRBC transfusion  H/H now stable     TENZIN (acute kidney injury)   Assessment & Plan  Creatinine improved from 1 26 -> 0 76 -> 0 72 today on IV fluids     BPH with urinary obstruction  Assessment & Plan  S/p TURP/TURBT last month  C/w Proscar  See plan for hematuria above     Suspected UTI  Assessment & Plan  Urinalysis with positive nitrites in the setting of acute medical issues  On IV Cefepime converted to PO Keflex complete course post discharge   No urine culture sent on admission     Malignant neoplasm of upper lobe of right lung   Assessment & Plan  Outpatient follow-up with oncology @ [facility]  Supportive care     Atrial fibrillation  Assessment & Plan  Rate controlled on Lopressor  Resume anticoagulation (Xarelto) as hematuria resolved  - recommend follow-up with native cardiologist about considering Watchman device     Abdominal aortic aneurysm (AAA)   Assessment & Plan  Outpatient vascular surgery follow-up w/ serial screening/monitoring  Maintain blood pressure control     Hyperlipidemia  Assessment & Plan  Continue statin     Hypokalemia  Assessment & Plan  Normalized status post repletion     Thrombocytopenia   Assessment & Plan  Stable      Condition at Discharge: fair       Discharge Day Visit / Exam:     Vitals: Blood Pressure: 114/55 (04/18/21 0900)  Pulse: 90 (04/18/21 0900)  Temperature: (!) 97 1 °F (36 2 °C) (04/18/21 0900)  Temp Source: Oral (04/16/21 1836)  Respirations: 18 (04/18/21 0900)  SpO2: 92 % (04/18/21 0900)      Physical exam - I had a face-to-face encounter with the patient on day of discharge  Discussion with Patient and/or Family:  The patient has been advised to return to the ER immediately if any symptoms recur or worsen  Discharge instructions/Information to Patient and/or Family:   See after visit summary for information provided to patient and/or family          Provisions for Follow-Up Care:  See after visit summary for information related to follow-up care and any pertinent home health orders  Disposition:   Home - refusing VNA services      Discharge Medications:  See after visit summary for reconciled discharge medications provided to patient and/or family  Discharge Statement:  I spent 38 minutes discharging the patient  This time was spent on the day of discharge  I had direct contact with the patient on the day of discharge  Greater than 50% of the total time was spent examining patient, answering all patient questions, arranging and discussing plan of care with patient as well as directly providing post-discharge instructions  Additional time then spent on discharge activities  ** Please Note: This note is constructed using a voice recognition dictation system  An occasional wrong word/phrase or sound-a-like substitution may have been picked up by dictation device due to the inherent limitations of voice recognition software  Read the chart carefully and recognize, using reasonable context, where substitutions may have occurred  **

## 2021-04-19 NOTE — TELEPHONE ENCOUNTER
LM for patient, Per Dr Ana Andujar note patient is to follow up with his urologist - Dr José Miguel Washington

## 2021-04-19 NOTE — TELEPHONE ENCOUNTER
Patient of Dr Velma Duarte seen in [facility] office  Patient had surgery and is requesting follow up appointment

## 2021-04-21 NOTE — TELEPHONE ENCOUNTER
Patient 's daughter called in regards to scheduling appointment for valadez removal     She can be reached at [phone] (M)

## 2021-04-21 NOTE — TELEPHONE ENCOUNTER
Patient managed by Dr Doctor and history of agram w/ L SFA/pop PTA 5/29/20 (González), with recurrent claudication now s/p agram w/ L CFA PTA and L SFA/pop PTA/stent 10/9/20  Patient calls office today with c/o LLE claudication which started about 2-3 wks ago  He states he was able to walk 1 5 miles, but now he has to stop after very short periods of walking due to L leg heaviness which is relieved after resting  Patient denies rest pain, color, and temperature change of LLE  He denies any numbness/tingling also  Please advise if you would like patient to have duplex  done sooner  He is currently scheduled for AOIL 7/26/21 and EMILY is not until Jan 2022

## 2021-04-21 NOTE — QUICK NOTE
Patient was seen over the weekend for urine retention and had emergent cysto clot evacuation by Dr Brisa Boyce  Ventral bulbar urethral trauma noted from valadez attempts in the ER  Valadez left for 10-14 days at Dr Carol Plata recommendation and discharged to home a few days ago  Called patient to arrange voiding trial and he advised he will be following up with Dr Brisa Boyce  We agreed to see if changes his mind

## 2021-04-21 NOTE — TELEPHONE ENCOUNTER
Pt is calling back to schedule follow up with Dr Myah Allen     Please call to schedule at [phone] or cell [phone]

## 2021-04-21 NOTE — TELEPHONE ENCOUNTER
Spoke to Patient's daughter Stone Ulrich, as per communication consent, who reports Patient would like to transfer his care to Dr Jazmin Angel  Advised would discuss recommended follow up with MD and call back to schedule  Advised valadez removal normally 10-14 days after surgery  Stone Ulrich denies any complaints from the Patient at this time and knows to call office with questions or concerns at this time

## 2021-04-22 NOTE — TELEPHONE ENCOUNTER
Spoke to Dr Jero Rascon who recommended follow up in 14 days from OR for void trial and follow up with MD in 4-6 weeks  Relayed information to Jasson Lopez and scheduled Patient for void trial on 4/29 (Patient had other appointment on 4/30)  Discussed process of void trial and all questions answered to Patient's satisfaction  Advised that MD's schedule is currently full but will call back or discuss at upcoming visit after schedule perusal  Jasson Lopez repeats back understanding and appointment information and agrees with plan

## 2021-04-29 NOTE — PROGRESS NOTES
4/29/2021    Derek Shelby  1934  [id number]    Diagnosis      Patient presents for void trial managed by Dr Ruperto Olivares  Patient will follow up with Dr Destinee Mcpherson in 6 weeks    Procedure Luo removal/voiding trial    Luo catheter removed after deflation of an intact balloon  Patient tolerated well  Encouraged patient to hydrate well and return this afternoon for post void residual   he knows he may return early if uncomfortable and unable to urinate  Patient agrees to this plan  Patient returned this afternoon  Patient states able to void  Patient voided 0 ml while in office  Bladder ultrasound performed and PVR measured 239ml  Recent Results (from the past 4 hour(s))   POCT Measure PVR    Collection Time: 04/29/21  2:58 PM   Result Value Ref Range    POST-VOID RESIDUAL VOLUME, ML  mL     Discuss with Dr Destinee Mcpherson and patient does CIC so he can continue to do CIC before bed and if he feels he isn't emptying    Patient understood    Vitals:    04/29/21 0901   BP: 130/68   BP Location: Left arm   Pulse: 80   Weight: 72 1 kg (159 lb)   Height: 5' 7" (1 702 m)           Frantz Wilkins RN

## 2021-05-03 NOTE — TELEPHONE ENCOUNTER
Patient of Dr Abhijeet Crowell seen while on call for [facility]; FULGERATION (N/A Bladder) 4/16/2021  Patient previously Patient of Dr Mike Apple, [facility] but wishes to transfer care to Dr Abhijeet Crowell  Previous history of hematuria, TURP 2010, CIC, BPH on finasteride, Bipolar TURP 3/02/2021, and bladder cancer  Patient on Xarelto for A fib  Patient arrived to office reporting intermittent "light pinkish" urine at start of urination but reports that it "quickly clears"  Also states "some burning" but denies any other complaints at this time  Discussed can be common for some hematuria while on blood thinners and ER precautions at length  Orders placed for UA C&S to rule out urinary tract infection  Office will follow up as results become available usually within 48-72 hours  Patient encouraged to hydrate well with water and avoid bladder irritants  Patient instructed to call back with worsening symptoms, fever, chills, blood in the urine or difficulty urinating  Patient aware to follow up as scheduled on 6/15/2021

## 2021-05-04 NOTE — TELEPHONE ENCOUNTER
Vanessa Duran PA-C  [facility] Urology [facility] Clinical             Please call patient and let him know that his urine cultures are negative at this time  He does not need to be treated with antibiotics   Please advise increase fluid intake     Call placed to patient, left detailed message per communication consent form to advise of above  Advised to follow up as scheduled  Office number provided on voicemail for any questions

## 2021-05-07 PROBLEM — I65.29 CAROTID ARTERY STENOSIS: Status: ACTIVE | Noted: 2021-01-01

## 2021-05-07 PROBLEM — I73.9 PAD (PERIPHERAL ARTERY DISEASE) (HCC): Status: ACTIVE | Noted: 2021-01-01

## 2021-05-07 NOTE — ASSESSMENT & PLAN NOTE
80year old male former smoker w/ HLD, atrial fibrillation on Xarelto, hx of lung ca, hx of bladder ca, venous disease, 4 8cm infrarenal AAA, asymptomatic bilateral carotid artery stenosis, L>R, PAD w/ short distance claudication s/p LLE arteriogram w/ SFA/popliteal PTA (IR 5/2020), with recurrent claudication s/p LLE CFA PTA and SFA/popliteal PTA/stent (Doctor 10/9/2020) and RLE arteriogram w/ profunda/SFA/popliteal artery PTA (Doctor 11/4/2020), presenting with recurrent LLE short distance calf claudication    EMILY   -Rt: Diffuse disease throughout the femoral-popliteal arteries  50-75% stenosis of the profunda and distal SFA  >50% popliteal stenosis  Tibioperoneal disease  BRITTANY 1 08/154/186  -Lt: Diffuse disease throughout the femoral-popliteal arteries  50-75% stenosis of the profunda and mid-distal SFA within stents  Tibioperoneal disease  BRITTANY 0 69/15/17    -Recurrent short distance claudication in the setting of PAD s/p multiple b/l interventions  Symptoms currently only present in LLE  Can only walk 50ft until he has to stop  -Imaging reviewed  Decrease in LLE BRITTANY from prior study in January  Recommend LLE arteriogram w/ possible intervention  -Discussed options including endovascular intervention vs conservative management  Patient wishes to proceed with arteriogram  -Is able to lay flat without difficulty   -No wounds or rest pain  -Continue ASA  Will need to hold Xarelto 2 days prior to arteriogram    -Patient has stopped taking statin medication secondary to intolerance   Encourage healthy diet and regular exercise as tolerated   -Last Cr was WNL  -Scheduling TBD

## 2021-05-07 NOTE — H&P (VIEW-ONLY)
Assessment/Plan:    PAD (peripheral artery disease) ([facility] [address] )  80year old male former smoker w/ HLD, atrial fibrillation on Xarelto, hx of lung ca, hx of bladder ca, venous disease, 4 8cm infrarenal AAA, asymptomatic bilateral carotid artery stenosis, L>R, PAD w/ short distance claudication s/p LLE arteriogram w/ SFA/popliteal PTA (IR 5/2020), with recurrent claudication s/p LLE CFA PTA and SFA/popliteal PTA/stent (Doctor 10/9/2020) and RLE arteriogram w/ profunda/SFA/popliteal artery PTA (Doctor 11/4/2020), presenting with recurrent LLE short distance calf claudication    EMILY   -Rt: Diffuse disease throughout the femoral-popliteal arteries  50-75% stenosis of the profunda and distal SFA  >50% popliteal stenosis  Tibioperoneal disease  BRITTANY 1 08/154/186  -Lt: Diffuse disease throughout the femoral-popliteal arteries  50-75% stenosis of the profunda and mid-distal SFA within stents  Tibioperoneal disease  BRITTANY 0 69/15/17    -Recurrent short distance claudication in the setting of PAD s/p multiple b/l interventions  Symptoms currently only present in LLE  Can only walk 50ft until he has to stop  -Imaging reviewed  Decrease in LLE BRITTANY from prior study in January  Recommend LLE arteriogram w/ possible intervention  -Discussed options including endovascular intervention vs conservative management  Patient wishes to proceed with arteriogram  -Is able to lay flat without difficulty   -No wounds or rest pain  -Continue ASA  Will need to hold Xarelto 2 days prior to arteriogram    -Patient has stopped taking statin medication secondary to intolerance  Encourage healthy diet and regular exercise as tolerated   -Last Cr was WNL  -Scheduling TBD    Carotid artery stenosis  -Bilateral carotid artery stenosis w/out symptoms  ->70% stenosis with elevated velocities of the L ICA  Patient had previous conversation with Dr Bing Mercer Doctor regarding obtaining further imaging and potentially pursuing surgical intervention   At the time patient refused   -Discussed with patient today the increased chance of stroke with >70% stenosis of the L ICA  Currently patient remains asymptomatic and is continuing to refuse surgical intervention    -Patient wishes to pursue intervention to the LLE as this is affecting his daily activities  He reports that he will reconsider carotid intervention at a later time    -Discussed the risk of stroke/TIA  Patient vocalized understanding and continues to refuse carotid intervention at this time  -Has repeat carotid duplex scheduled for July  Will discuss further at that time     Abdominal aortic aneurysm (AAA)   -Hx of 4 8cm AAA  -Has remained stable  -Continue with routine surveillance with AOIL q6mo        Diagnoses and all orders for this visit:    PAD (peripheral artery disease) (HCC)    Bilateral carotid artery stenosis    Abdominal aortic aneurysm (AAA)           Subjective:      Patient ID: Renny Manuel is a 80 y o  male  Patient is here to rewv EMILY/ AOIL done on 4/30/21  Patient c/o claudication after walking a couple of blocks Lt>Rt  Patient denies wounds or ulcers  Patient is on Xarelto and Crestor  80year old male with hx of PAD presenting with worsening short distance claudication of the LLE  Patient states that he is able to walk 50 ft before he has to stop secondary to the pain  He reports symptoms are only in his left calf  He denies rest pain or wounds  He states that this is affecting his day-to-day activities and he wishes to proceed with arteriogram  He has a hx of carotid stenosis and AAA  He remains asymptomatic from both perspectives  He continues to refuse surgical intervention for his L ICA stenosis       The following portions of the patient's history were reviewed and updated as appropriate: allergies, current medications, past family history, past medical history, past social history, past surgical history and problem list     Review of Systems   Constitutional: Negative  HENT: Negative  Eyes: Negative  Respiratory: Negative  Cardiovascular: Negative  Gastrointestinal: Negative  Endocrine: Negative  Genitourinary: Negative  Musculoskeletal: Negative  Skin: Negative  Allergic/Immunologic: Negative  Neurological: Negative  Hematological: Negative  Psychiatric/Behavioral: Negative  I have reviewed and made appropriate changes to the review of systems input by the medical assistant      Vitals:    05/07/21 1412   BP: 136/70   BP Location: Right arm   Patient Position: Sitting   Cuff Size: Standard   Pulse: 88   Temp: 98 7 °F (37 1 °C)   TempSrc: Tympanic   Weight: 72 1 kg (159 lb)   Height: 5' 7" (1 702 m)       Patient Active Problem List   Diagnosis    Abdominal aortic aneurysm (AAA)     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)    Malignant neoplasm of upper lobe of right lung     Atrial fibrillation    Benign essential hypertension    Hemangioma of liver    Hyperlipidemia    Venous insufficiency    Asymptomatic bilateral carotid artery stenosis    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)    BPH with urinary obstruction    Bladder diverticulum    Gross hematuria    Hematuria    Suspected UTI    Acute blood loss anemia    TENZIN (acute kidney injury)     Hypokalemia    Thrombocytopenia     PAD (peripheral artery disease) ([address] )    Carotid artery stenosis       Past Surgical History:   Procedure Laterality Date    BOWEL RESECTION      COLONOSCOPY      HERNIA REPAIR      X2    IR AORTAGRAM WITH RUN-OFF  5/29/2020    SEVERAL    IR AORTAGRAM WITH RUN-OFF  10/9/2020    IR LOWER EXTREMITY ANGIOGRAM  11/4/2020    CT CYSTOURETHROSCOPY W/IRRIG & EVAC CLOTS N/A 4/16/2021    Procedure: CYSTOSCOPY EVACUATION OF Iván Beatty;  Surgeon: Skylar Zhang MD;  [facility]: [facility];  Service: Urology    CT CYSTOURETHROSCOPY,FULGUR 2-5 CM LESN N/A 1/15/2021    Procedure: Shasha Segovia RESECTION OF BLADDER TUMOR (TURBT);   Surgeon: Aroldo Segura MD;  Location: [facility];  Service: Urology    PA [facility] 3RD+ ORD SLCTV ABDL PEL/LXTR [facility] Left 10/9/2020    Procedure: ARTERIOGRAM Left lower extremity agram right femoral access with SFA stenting/angioplasty;  Surgeon: Sarah Simmons MD;  Location:  MAIN OR;  Service: Vascular    PA [facility] 3RD+ ORD SLCTV ABDL PEL/[facility] Right 2020    Procedure: ARTERIOGRAM RLE angiogram L fem access;  Surgeon: Vickie Alejandro MD;  Location:  MAIN OR;  Service: Vascular    PA TRANSURETHRAL ELEC-SURG PROSTATECTOM N/A 3/2/2021    Procedure: CYSTOSCOPY, TRANSURETHRAL RESECTION OF Dewain Page;  Surgeon: Aroldo Segura MD;  Location: [address] [place] [address];  Service: Urology    PROSTATE SURGERY      REMOVAL VENOUS PORT (PORT-A-CATH)         Family History   Problem Relation Age of Onset    Alzheimer's disease Mother     Cancer Father        Social History     Socioeconomic History    Marital status: Single     Spouse name: Not on file    Number of children: Not on file    Years of education: Not on file    Highest education level: Not on file   Occupational History    Not on file   Social Needs    Financial resource strain: Not on file    Food insecurity     Worry: Not on file     Inability: Not on file    Transportation needs     Medical: Not on file     Non-medical: Not on file   Tobacco Use    Smoking status: Former Smoker     Years: 30 00     Types: Cigarettes     Quit date: 1988     Years since quittin 3    Smokeless tobacco: Never Used   Substance and Sexual Activity    Alcohol use: Yes     Comment: SOCIAL    Drug use: Never    Sexual activity: Never   Lifestyle    Physical activity     Days per week: Not on file     Minutes per session: Not on file    Stress: Not on file   Relationships    Social connections     Talks on phone: Not on file     Gets together: Not on file     Attends Buddhism service: Not on file Active member of club or organization: Not on file     Attends meetings of clubs or organizations: Not on file     Relationship status: Not on file    Intimate partner violence     Fear of current or ex partner: Not on file     Emotionally abused: Not on file     Physically abused: Not on file     Forced sexual activity: Not on file   Other Topics Concern    Not on file   Social History Narrative    Not on file       Allergies   Allergen Reactions    Sulfa Antibiotics Shortness Of Breath     Other reaction(s): Other (See Comments)  SOB      Protamine      Other reaction(s): Other (See Comments)  Did not work for patient           Current Outpatient Medications:     belladonna-opium (B&O SUPPOSITORY) 16 2-30 mg suppository, Insert 1 suppository into the rectum every 8 (eight) hours as needed for bladder spasmsMax Daily Amount: 3 suppositories, Disp: 10 suppository, Rfl: 0    hydrochlorothiazide (HYDRODIURIL) 25 mg tablet, Take 25 mg by mouth daily, Disp: , Rfl:     metoprolol tartrate (LOPRESSOR) 25 mg tablet, Take 25 mg by mouth daily , Disp: , Rfl:     finasteride (PROSCAR) 5 mg tablet, Take 5 mg by mouth daily, Disp: , Rfl:     rivaroxaban (Xarelto) 10 mg tablet, Take 10 mg by mouth daily at bedtime To check with surgeon when to hold RX  Pt advised to stop after 2/26/21, Disp: , Rfl:     rosuvastatin (CRESTOR) 20 MG tablet, Take 1 tablet (20 mg total) by mouth daily (Patient not taking: Reported on 4/16/2021), Disp: 90 each, Rfl: 4    Objective:      /70 (BP Location: Right arm, Patient Position: Sitting, Cuff Size: Standard)   Pulse 88   Temp 98 7 °F (37 1 °C) (Tympanic)   Ht 5' 7" (1 702 m)   Wt 72 1 kg (159 lb)   BMI 24 90 kg/m²          Physical Exam  Constitutional:       General: He is not in acute distress  Appearance: Normal appearance  He is not ill-appearing, toxic-appearing or diaphoretic  HENT:      Head: Normocephalic and atraumatic        Right Ear: External ear normal  Left Ear: External ear normal       Nose: Nose normal       Mouth/Throat:      Mouth: Mucous membranes are moist       Pharynx: Oropharynx is clear  Eyes:      General: No scleral icterus  Extraocular Movements: Extraocular movements intact  Conjunctiva/sclera: Conjunctivae normal    Neck:      Musculoskeletal: Normal range of motion and neck supple  Vascular: Carotid bruit present  Cardiovascular:      Rate and Rhythm: Normal rate and regular rhythm  Pulses:           Dorsalis pedis pulses are detected w/ Doppler on the right side and 0 on the left side  Posterior tibial pulses are detected w/ Doppler on the right side and 0 on the left side  Heart sounds: Normal heart sounds  Pulmonary:      Effort: Pulmonary effort is normal  No respiratory distress  Breath sounds: Normal breath sounds  Abdominal:      General: Abdomen is flat  Palpations: Abdomen is soft  Tenderness: There is no abdominal tenderness  Musculoskeletal: Normal range of motion  Skin:     General: Skin is warm and dry  Capillary Refill: Capillary refill takes less than 2 seconds  Comments: Bilateral feet warm   Neurological:      General: No focal deficit present  Mental Status: He is alert and oriented to person, place, and time  Mental status is at baseline  Cranial Nerves: No cranial nerve deficit  Sensory: No sensory deficit  Motor: No weakness     Psychiatric:         Mood and Affect: Mood normal          Behavior: Behavior normal        Operative Scheduling Information:    Hospital:  Any IR/endo suite    Physician:  Any available surgeon or IR doc    Surgery: Abdominal aortogram with left lower extremity run off, possible intervention     Urgency:  Standard    Level:  Level 2: Outpatients to be scheduled for surgery with time dependent medical necessity within 2 weeks    Case Length:  Normal    Post-op Bed:  Outpatient    OR Table:  IR    Equipment Needs:  None    Medication Instructions:  Aspirin:   Continue (do not hold)  Xarelto:  Hold for 2 days prior to procedure    Hydration:  No

## 2021-05-07 NOTE — PATIENT INSTRUCTIONS
Nonruptured Abdominal Aortic Aneurysm   AMBULATORY CARE:   An abdominal aortic aneurysm (AAA)  is a bulging or weak area in your abdominal aorta  Over time, the bulge may grow and is at risk for tearing or rupturing  The aorta is a large blood vessel that extends from your heart to your abdomen  The part of the aorta that extends into your abdomen is called your abdominal aorta  Your abdominal aorta brings blood to your stomach, pelvis, and legs  An AAA that ruptures is a life-threatening emergency  Signs and symptoms: An AAA usually does not have signs or symptoms if it has not ruptured  If the AAA starts to leak or ruptures, you may have any of the following:  · Sudden pain in your abdomen, groin, back, legs, or buttocks    · Nausea and vomiting    · A lump or swelling in your abdomen    · Stiff abdominal muscles    · Numbness or tingling in your legs    · Pale, sweaty, or clammy skin    · Dizziness, fainting or loss of consciousness    Call your local emergency number (911 in the [place]), or have someone else call if:   · You faint or lose consciousness  · You cannot be woken  Seek care immediately if:  The following signs or symptoms may mean the AAA is at risk of rupturing:  · You have sudden sharp pain in your abdomen, groin, back, legs, or buttocks  · You have nausea and vomiting  · You feel dizzy  · You have stiffness or swelling in your abdomen, or a lump in your abdomen  · You have numbness or tingling in your legs  · Your skin is pale, sweaty, or clammy  Call your doctor if:   · You have questions or concerns about your condition or care  Treatment for an AAA  may not be needed  Your healthcare provider may monitor the size of your AAA with tests, such as an ultrasound  You may be given medicines to prevent the AAA from growing  Examples include medicines to lower your blood pressure or cholesterol level   If your AAA gets bigger, starts to leak, or ruptures, you may need any of the following:  · Endovascular repair  is a procedure that uses a graft to repair your AAA  The graft stops blood flow to the aneurysm and protects your abdominal aorta  You may need to have more than 1 endovascular repair  · Open repair  is surgery to repair or remove an AAA  Manage a nonruptured AAA:  You can help prevent your AAA from growing or rupturing by doing the following:  · Do not smoke  Nicotine and other chemicals in cigarettes and cigars can increase your blood pressure  It can also damage your aorta and increase the size of your AAA  Ask your healthcare provider for information if you currently smoke and need help to quit  E-cigarettes or smokeless tobacco still contain nicotine  Talk to your healthcare provider before you use these products  · Exercise as directed  Exercise can help control your blood pressure and cholesterol level  Ask your healthcare provider how much exercise you need each day and which exercises are best for you  · Follow the meal plan recommended by your healthcare provider  Talk to your dietitian about a heart-healthy or low-sodium eating plan  Meal plans will help you lower your cholesterol and blood pressure  They will also help you reach a healthy weight  · Do not lift anything heavier than 10 pounds  Heavy lifting can increase pressure in your abdominal aorta  This can increase your risk for a ruptured AAA  Follow up with your doctor as directed: You will need regular tests and follow-up visits to monitor the size of your AAA  Keep all appointments  Write down your questions so you remember to ask them during your visits  © Copyright [address] Information is for End User's use only and may not be sold, redistributed or otherwise used for commercial purposes  All illustrations and images included in CareNotes® are the copyrighted property of [employer]  or [facility]   The above information is an  only   It is not intended as medical advice for individual conditions or treatments  Talk to your doctor, nurse or pharmacist before following any medical regimen to see if it is safe and effective for you  Peripheral Artery Disease   WHAT YOU NEED TO KNOW:   What is peripheral artery disease? Peripheral artery disease (PAD) is narrow, weak, or blocked arteries  It may affect any arteries outside of your heart and brain  PAD is usually the result of a buildup of fat and cholesterol, also called plaque, along your artery walls  Inflammation, a blood clot, or abnormal cell growth could also block your arteries  PAD prevents normal blood flow to your legs and arms  You are at risk of an amputation if poor blood flow keeps wounds from healing or causes gangrene (tissue death)  Without treatment, PAD can also cause a heart attack or stroke  What increases my risk for PAD? · Smoking cigarettes     · Diabetes     · High blood pressure     · High cholesterol     · Age older than 40 years    · Heart disease or a family history of heart disease    What are the signs and symptoms of PAD? Mild PAD usually does not cause symptoms  As the disease worsens over time, you may have the following:  · Pain or cramps in your leg or hip while you walk     · A numb, weak, or heavy feeling in your legs     · Dry, scaly, red, or pale skin on your legs     · Thick or brittle nails, or hair loss on your arms and legs     · Foot sores that will not heal     · Burning or aching in your feet and toes while resting (this may be worse when you lie down)    How is PAD diagnosed? · Angiography  is a test that shows pictures of the arteries in your arms and legs  You will be given contrast liquid to help the arteries show up better on the pictures  The pictures will be taken with an MRI or CT scan  Tell the healthcare provider if you have ever had an allergic reaction to contrast liquid   Do not enter the MRI room with anything metal  Metal can cause serious injury  Tell a healthcare provider if you have any metal in or on your body  · Doppler ankle brachial index (BRITTANY)  is a test that compares blood pressure in your ankles to blood pressure in your arms  This tells your healthcare provider how well blood is flowing through the arteries in your legs  How is PAD treated? Treatment can help reduce your risk of a heart attack, stroke, or amputation  You may need more than one of the following:  · Medicines  may be given  Your healthcare provider may give you any of the following:     ? Antiplatelet medicine,  such as aspirin, helps prevent blood clots and reduces the risk of a heart attack or stroke  ? Statin medicine  helps lower your cholesterol and prevents your PAD from getting worse  · A supervised exercise program  helps you stay active in normal daily activities and may prevent disability  Healthcare providers will help you safely walk or do strength training exercises 3 times a week for 30 to 60 minutes  You will do this for several months, then transition to walking on your own  · Angioplasty  is a procedure to open your artery so blood can flow through normally  A thin tube called a catheter is used to insert a small balloon into your artery  The balloon is inflated to open your blocked artery, and then removed  A tube called a stent may be placed in your artery to hold it open  · Bypass surgery  is used to make a new connection to your artery with a vein from another part of your body, or an artificial graft  The vein or graft is attached to your artery above and below your blockage  This allows blood to flow around the blocked portion of your artery  How can I manage PAD? · Do not smoke  Nicotine and other chemicals in cigarettes and cigars can worsen PAD  They can also increase your risk for a heart attack or stroke  Ask your healthcare provider for information if you currently smoke and need help to quit   E-cigarettes or smokeless tobacco still contain nicotine  Talk to your healthcare provider before you use these products  · Manage other health conditions  Take your medicines as directed  Follow your healthcare provider's instructions if you have high blood pressure or high cholesterol  Perform foot care and check your blood sugar levels as directed if you have diabetes  · Eat heart healthy foods  Eat whole grains, fruits, and vegetables every day  Limit salt and high-fat foods  Ask your healthcare provider for more information on a heart healthy diet  Ask if you need to lose weight  Your healthcare provider can help you create a healthy weight-loss plan  Call 911 for the following:   · You have any of the following signs of a heart attack:      ? Squeezing, pressure, or pain in your chest    ? You may  also have any of the following:     ? Discomfort or pain in your back, neck, jaw, stomach, or arm    ? Shortness of breath    ? Nausea or vomiting    ? Lightheadedness or a sudden cold sweat    · You have any of the following signs of a stroke:      ? Numbness or drooping on one side of your face     ? Weakness in an arm or leg    ? Confusion or difficulty speaking    ? Dizziness, a severe headache, or vision loss    When should I seek immediate care? · You have sores or wounds that will not heal      · You notice black or discolored skin on your arm or leg  · Your skin is cool to the touch  When should I contact my healthcare provider? · You have leg pain when you walk 1/8 mile (200 meters) or less, even with treatment  · Your legs are red, dry, or pale, even with treatment  · You have questions or concerns about your condition or care  CARE AGREEMENT:   You have the right to help plan your care  Learn about your health condition and how it may be treated  Discuss treatment options with your healthcare providers to decide what care you want to receive   You always have the right to refuse treatment  The above information is an  only  It is not intended as medical advice for individual conditions or treatments  Talk to your doctor, nurse or pharmacist before following any medical regimen to see if it is safe and effective for you  © Copyright [address] Information is for End User's use only and may not be sold, redistributed or otherwise used for commercial purposes  All illustrations and images included in CareNotes® are the copyrighted property of [employer]  or [facility]   Carotid Artery Disease   AMBULATORY CARE:   Carotid artery disease  is a condition that causes narrow or blocked carotid arteries  Your carotid arteries are the blood vessels that supply your brain with most of the blood it needs to work  You have 2 carotid arteries, one on each side of your neck  Call 911 for any of the following:   · You have any of the following signs of a stroke:      ? Numbness or drooping on one side of your face     ? Weakness in an arm or leg    ? Confusion or difficulty speaking    ? Dizziness, a severe headache, or vision loss    · You have any of the following signs of a heart attack:      ? Squeezing, pressure, or pain in your chest    ? You may  also have any of the following:     § Discomfort or pain in your back, neck, jaw, stomach, or arm    § Shortness of breath    § Nausea or vomiting    § Lightheadedness or a sudden cold sweat    Contact your healthcare provider if:   · You have questions or concerns about your condition or care  Signs and symptoms of carotid artery disease: You may have no signs or symptoms  Most commonly, carotid artery disease causes transient ischemic attacks (TIAs), or mini-strokes  You may have numbness, weakness, lack of movement, or vision or speech problems  A TIA goes away quickly and does not cause permanent damage  A TIA may be a warning sign that you are about to have a stroke   If you have any symptoms of a TIA or stroke, seek care immediately  Warning signs of a stroke: The word F A S T  can help you remember and recognize warning signs of a stroke  · F = Face:  One side of the face droops  · A = Arms:  One arm starts to drop when both arms are raised  · S = Speech:  Speech is slurred or sounds different than usual     · T = Time:  A person who is having a stroke needs to be seen immediately  A stroke is a medical emergency that needs immediate treatment  Some medicines and treatments work best if given within a few hours of a stroke  Treatment  for carotid artery disease depends on how narrow your arteries have become, your symptoms, and your general health  The goal of treatment is to lower your risk for a stroke  You may need any of the following:  · Take aspirin if directed  Your healthcare provider may suggest that you take an aspirin a day to prevent blood clots from forming in the carotid arteries  If your healthcare provider wants you to take aspirin daily, do not take acetaminophen or ibuprofen instead  · Control risk factors  High blood pressure, high cholesterol, heart disease, diabetes, and being overweight increase your risk for atherosclerosis  · Procedures can help open blocked arteries  A carotid endarterectomy is used to cut plaque out of the artery  An angioplasty is used to push the plaque against the artery wall with a balloon device  Sometimes a stent is placed during an angioplasty  A stent is a metal mesh tube that is placed in the artery to keep it open  Manage carotid artery disease:   · Eat a variety of healthy foods  Healthy foods include fruit, vegetables, whole-grain breads, low-fat dairy products, lean meat, and fish  Choose fish that are high in omega-3 fatty acids, such as salmon and fresh tuna  Ask your healthcare provider for more information on a heart healthy diet and the DASH eating plan  · Limit sodium (salt)  Sodium may increase your blood pressure  Add less table salt to your foods  Read food labels and choose foods that are low in sodium  Your healthcare provider may suggest you follow a low sodium diet  · Reach or maintain a healthy weight  Extra weight makes your heart work harder  Ask your healthcare provider how much you should weight  He can help you create a safe weight loss plan  Even a weight loss of 10% of your body weight can help your heart function better  · Exercise as directed  Exercise helps improve heart function and can help you manage your weight  Exercise can also help lower your cholesterol and blood sugar levels  Try to get at least 30 minutes of exercise at least 5 times each week  Try to be active every day  Your healthcare provider can help you create an exercise plan that works best for you  · Limit alcohol  Alcohol can increase your blood pressure and triglyceride levels  Men should limit alcohol to 2 drinks per day  Women should limit alcohol to 1 drink per day  A drink of alcohol is 12 ounces of beer, 5 ounces of wine, or 1½ ounces of liquor  · Do not smoke  Nicotine and other chemicals in cigarettes and cigars can cause heart and lung damage  Ask your healthcare provider for information if you currently smoke and need help to quit  E-cigarettes or smokeless tobacco still contain nicotine  Talk to your healthcare provider before you use these products  Follow up with your healthcare provider as directed:  Write down your questions so you remember to ask them during your visits  © Copyright [address] Information is for End User's use only and may not be sold, redistributed or otherwise used for commercial purposes  All illustrations and images included in CareNotes® are the copyrighted property of [employer] Inc  or [address] [place] [address]  The above information is an  only  It is not intended as medical advice for individual conditions or treatments   Talk to your doctor, nurse or pharmacist before following any medical regimen to see if it is safe and effective for you

## 2021-05-07 NOTE — TELEPHONE ENCOUNTER
From: Linnette Bakerdorothy [email or web address]   Sent: Friday, May 7, 2021 3:41 PM  To: IR Scheduling [email or web address] [email or web address]  Cc: Vascular Surgery Schedulers [email or web address]  Subject: AGRAM for IR Doc to do    Good Afternoon,    Nathan Merida has ordered a LLE arteriogram with possible intervention for an IR doctor to do within the next 2 weeks     PT: Tio Saucedo   : [birth year]   [facility] 30: [phone]  DX: [phone]  Patient allergic to sulfa antibiotics and protamine   Consent to be signed on admit   IR referral be entered by Arthur Dukes,    Ruslan Burciaga  Surgery DR BOND [facility]  [address] 30: 255 [email or web address]        Confidentiality Notice: This e-mail message, including any attachments, is for the sole use of intended recipient(s) and may contain confidential and privileged information  Any unauthorized review, use, disclosure or distribution is prohibited  If you are not the intended recipient, please contact the sender by reply e-mail and destroy all copies of the original message

## 2021-05-07 NOTE — ASSESSMENT & PLAN NOTE
-Bilateral carotid artery stenosis w/out symptoms  ->70% stenosis with elevated velocities of the L ICA  Patient had previous conversation with Dr Carolina Coombs Doctor regarding obtaining further imaging and potentially pursuing surgical intervention  At the time patient refused   -Discussed with patient today the increased chance of stroke with >70% stenosis of the L ICA  Currently patient remains asymptomatic and is continuing to refuse surgical intervention    -Patient wishes to pursue intervention to the LLE as this is affecting his daily activities  He reports that he will reconsider carotid intervention at a later time    -Discussed the risk of stroke/TIA  Patient vocalized understanding and continues to refuse carotid intervention at this time  -Has repeat carotid duplex scheduled for July   Will discuss further at that time

## 2021-05-07 NOTE — TELEPHONE ENCOUNTER
REMINDER: Under Reason For Call, comments MUST be formatted as:   (Surgeon's Initials) / (Procedure)    Physician / Hospital: [address] / Any Facility   ANY IR/ENDO SUITE    Procedure: Abdominal aortogram with left lower extremity run off, possible intervention     Level: 2 - Route clearance(s) to [facility] Clearance Pool     Equipment / Rep Needs: No    Assistant Surgeon: No    Allergies: Sulfa antibiotics and Protamine    Instructions Given: NONE    Blood Thinners / Medication Hold: Hold Rx - Xarelto (Rivaroxaban) , 2 days prior to procedure  ASPIRIN CONTINUE DO NOT HOLD  Hydration Required: Patient does not require hydration  Dialysis: Patient is not on dialysis  Consent: Consent will be signed day of procedure  No, I have LABELED the consent in Epic as Consent for Vascular Procedure  Clearances     Levels   1-3 ROUTE this encounter to [facility] Clearance Pool   AND   SEND Clearance Form(s) to Vascular Nursing e-mail group   Level   4 ROUTE this encounter to [facility] Surgery Coordinator Pool  AND   SEND Clearance Form(s) to Vascular Surgery Schedulers e-mail group     Patient does not require any pre operative clearance  Yes, I have ROUTED this encounter to [facility] Surgery Coordinator and/or [facility] Clearance Pool

## 2021-05-07 NOTE — PROGRESS NOTES
Assessment/Plan:    PAD (peripheral artery disease) ([facility] [address] )  80year old male former smoker w/ HLD, atrial fibrillation on Xarelto, hx of lung ca, hx of bladder ca, venous disease, 4 8cm infrarenal AAA, asymptomatic bilateral carotid artery stenosis, L>R, PAD w/ short distance claudication s/p LLE arteriogram w/ SFA/popliteal PTA (IR 5/2020), with recurrent claudication s/p LLE CFA PTA and SFA/popliteal PTA/stent (Doctor 10/9/2020) and RLE arteriogram w/ profunda/SFA/popliteal artery PTA (Doctor 11/4/2020), presenting with recurrent LLE short distance calf claudication    EMILY   -Rt: Diffuse disease throughout the femoral-popliteal arteries  50-75% stenosis of the profunda and distal SFA  >50% popliteal stenosis  Tibioperoneal disease  BRITTANY 1 08/154/186  -Lt: Diffuse disease throughout the femoral-popliteal arteries  50-75% stenosis of the profunda and mid-distal SFA within stents  Tibioperoneal disease  BRITTANY 0 69/15/17    -Recurrent short distance claudication in the setting of PAD s/p multiple b/l interventions  Symptoms currently only present in LLE  Can only walk 50ft until he has to stop  -Imaging reviewed  Decrease in LLE BRITTANY from prior study in January  Recommend LLE arteriogram w/ possible intervention  -Discussed options including endovascular intervention vs conservative management  Patient wishes to proceed with arteriogram  -Is able to lay flat without difficulty   -No wounds or rest pain  -Continue ASA  Will need to hold Xarelto 2 days prior to arteriogram    -Patient has stopped taking statin medication secondary to intolerance  Encourage healthy diet and regular exercise as tolerated   -Last Cr was WNL  -Scheduling TBD    Carotid artery stenosis  -Bilateral carotid artery stenosis w/out symptoms  ->70% stenosis with elevated velocities of the L ICA  Patient had previous conversation with Dr Silke Pierson Doctor regarding obtaining further imaging and potentially pursuing surgical intervention   At the time patient refused   -Discussed with patient today the increased chance of stroke with >70% stenosis of the L ICA  Currently patient remains asymptomatic and is continuing to refuse surgical intervention    -Patient wishes to pursue intervention to the LLE as this is affecting his daily activities  He reports that he will reconsider carotid intervention at a later time    -Discussed the risk of stroke/TIA  Patient vocalized understanding and continues to refuse carotid intervention at this time  -Has repeat carotid duplex scheduled for July  Will discuss further at that time     Abdominal aortic aneurysm (AAA)   -Hx of 4 8cm AAA  -Has remained stable  -Continue with routine surveillance with AOIL q6mo        Diagnoses and all orders for this visit:    PAD (peripheral artery disease) (HCC)    Bilateral carotid artery stenosis    Abdominal aortic aneurysm (AAA)           Subjective:      Patient ID: Renetta Browne is a 80 y o  male  Patient is here to rewv EMILY/ AOIL done on 4/30/21  Patient c/o claudication after walking a couple of blocks Lt>Rt  Patient denies wounds or ulcers  Patient is on Xarelto and Crestor  80year old male with hx of PAD presenting with worsening short distance claudication of the LLE  Patient states that he is able to walk 50 ft before he has to stop secondary to the pain  He reports symptoms are only in his left calf  He denies rest pain or wounds  He states that this is affecting his day-to-day activities and he wishes to proceed with arteriogram  He has a hx of carotid stenosis and AAA  He remains asymptomatic from both perspectives  He continues to refuse surgical intervention for his L ICA stenosis       The following portions of the patient's history were reviewed and updated as appropriate: allergies, current medications, past family history, past medical history, past social history, past surgical history and problem list     Review of Systems   Constitutional: Negative  HENT: Negative  Eyes: Negative  Respiratory: Negative  Cardiovascular: Negative  Gastrointestinal: Negative  Endocrine: Negative  Genitourinary: Negative  Musculoskeletal: Negative  Skin: Negative  Allergic/Immunologic: Negative  Neurological: Negative  Hematological: Negative  Psychiatric/Behavioral: Negative  I have reviewed and made appropriate changes to the review of systems input by the medical assistant      Vitals:    05/07/21 1412   BP: 136/70   BP Location: Right arm   Patient Position: Sitting   Cuff Size: Standard   Pulse: 88   Temp: 98 7 °F (37 1 °C)   TempSrc: Tympanic   Weight: 72 1 kg (159 lb)   Height: 5' 7" (1 702 m)       Patient Active Problem List   Diagnosis    Abdominal aortic aneurysm (AAA)     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)    Malignant neoplasm of upper lobe of right lung     Atrial fibrillation    Benign essential hypertension    Hemangioma of liver    Hyperlipidemia    Venous insufficiency    Asymptomatic bilateral carotid artery stenosis    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)    BPH with urinary obstruction    Bladder diverticulum    Gross hematuria    Hematuria    Suspected UTI    Acute blood loss anemia    TENZIN (acute kidney injury)     Hypokalemia    Thrombocytopenia     PAD (peripheral artery disease) ([address] )    Carotid artery stenosis       Past Surgical History:   Procedure Laterality Date    BOWEL RESECTION      COLONOSCOPY      HERNIA REPAIR      X2    IR AORTAGRAM WITH RUN-OFF  5/29/2020    SEVERAL    IR AORTAGRAM WITH RUN-OFF  10/9/2020    IR LOWER EXTREMITY ANGIOGRAM  11/4/2020    [place] CYSTOURETHROSCOPY W/IRRIG & EVAC CLOTS N/A 4/16/2021    Procedure: CYSTOSCOPY EVACUATION OF Aliyah Chiles;  Surgeon: Joe Winter MD;  [facility];  Service: Urology    [place] CYSTOURETHROSCOPY,FULGUR 2-5 CM LESN N/A 1/15/2021    Procedure: Gosia Kessler RESECTION OF BLADDER TUMOR (TURBT);   Surgeon: Raz Hutson MD;  Location: [facility];  Service: Urology    CA Yue Arreola 3RD+ ORD SLCTV ABDL PEL/LXTR [facility] Left 10/9/2020    Procedure: ARTERIOGRAM Left lower extremity agram right femoral access with SFA stenting/angioplasty;  Surgeon: Felipa Simmons MD;  Location:  MAIN OR;  Service: Vascular    CA [facility]ue [facility]    Procedure: ARTERIOGRAM RLE angiogram L fem access;  Surgeon: Riaz Meraz MD;  Location:  MAIN OR;  Service: Vascular    CA TRANSURETHRAL ELEC-SURG PROSTATECTOM N/A 3/2/2021    Procedure: CYSTOSCOPY, TRANSURETHRAL RESECTION OF Ector Delacruz;  Surgeon: Raz Hutson MD;  [facility];  Service: Urology    PROSTATE SURGERY      REMOVAL VENOUS PORT (PORT-A-CATH)         Family History   Problem Relation Age of Onset    Alzheimer's disease Mother     Cancer Father        Social History     Socioeconomic History    Marital status: Single     Spouse name: Not on file    Number of children: Not on file    Years of education: Not on file    Highest education level: Not on file   Occupational History    Not on file   Social Needs    Financial resource strain: Not on file    Food insecurity     Worry: Not on file     Inability: Not on file    Transportation needs     Medical: Not on file     Non-medical: Not on file   Tobacco Use    Smoking status: Former Smoker     Years: 30 00     Types: Cigarettes     Quit date: 1988     Years since quittin 3    Smokeless tobacco: Never Used   Substance and Sexual Activity    Alcohol use: Yes     Comment: SOCIAL    Drug use: Never    Sexual activity: Never   Lifestyle    Physical activity     Days per week: Not on file     Minutes per session: Not on file    Stress: Not on file   Relationships    Social connections     Talks on phone: Not on file     Gets together: Not on file     Attends [employer] service: Not on file Active member of club or organization: Not on file     Attends meetings of clubs or organizations: Not on file     Relationship status: Not on file    Intimate partner violence     Fear of current or ex partner: Not on file     Emotionally abused: Not on file     Physically abused: Not on file     Forced sexual activity: Not on file   Other Topics Concern    Not on file   Social History Narrative    Not on file       Allergies   Allergen Reactions    Sulfa Antibiotics Shortness Of Breath     Other reaction(s): Other (See Comments)  SOB      Protamine      Other reaction(s): Other (See Comments)  Did not work for patient           Current Outpatient Medications:     belladonna-opium (B&O SUPPOSITORY) 16 2-30 mg suppository, Insert 1 suppository into the rectum every 8 (eight) hours as needed for bladder spasmsMax Daily Amount: 3 suppositories, Disp: 10 suppository, Rfl: 0    hydrochlorothiazide (HYDRODIURIL) 25 mg tablet, Take 25 mg by mouth daily, Disp: , Rfl:     metoprolol tartrate (LOPRESSOR) 25 mg tablet, Take 25 mg by mouth daily , Disp: , Rfl:     finasteride (PROSCAR) 5 mg tablet, Take 5 mg by mouth daily, Disp: , Rfl:     rivaroxaban (Xarelto) 10 mg tablet, Take 10 mg by mouth daily at bedtime To check with surgeon when to hold RX  Pt advised to stop after 2/26/21, Disp: , Rfl:     rosuvastatin (CRESTOR) 20 MG tablet, Take 1 tablet (20 mg total) by mouth daily (Patient not taking: Reported on 4/16/2021), Disp: 90 each, Rfl: 4    Objective:      /70 (BP Location: Right arm, Patient Position: Sitting, Cuff Size: Standard)   Pulse 88   Temp 98 7 °F (37 1 °C) (Tympanic)   Ht 5' 7" (1 702 m)   Wt 72 1 kg (159 lb)   BMI 24 90 kg/m²          Physical Exam  Constitutional:       General: He is not in acute distress  Appearance: Normal appearance  He is not ill-appearing, toxic-appearing or diaphoretic  HENT:      Head: Normocephalic and atraumatic        Right Ear: External ear normal  Left Ear: External ear normal       Nose: Nose normal       Mouth/Throat:      Mouth: Mucous membranes are moist       Pharynx: Oropharynx is clear  Eyes:      General: No scleral icterus  Extraocular Movements: Extraocular movements intact  Conjunctiva/sclera: Conjunctivae normal    Neck:      Musculoskeletal: Normal range of motion and neck supple  Vascular: Carotid bruit present  Cardiovascular:      Rate and Rhythm: Normal rate and regular rhythm  Pulses:           Dorsalis pedis pulses are detected w/ Doppler on the right side and 0 on the left side  Posterior tibial pulses are detected w/ Doppler on the right side and 0 on the left side  Heart sounds: Normal heart sounds  Pulmonary:      Effort: Pulmonary effort is normal  No respiratory distress  Breath sounds: Normal breath sounds  Abdominal:      General: Abdomen is flat  Palpations: Abdomen is soft  Tenderness: There is no abdominal tenderness  Musculoskeletal: Normal range of motion  Skin:     General: Skin is warm and dry  Capillary Refill: Capillary refill takes less than 2 seconds  Comments: Bilateral feet warm   Neurological:      General: No focal deficit present  Mental Status: He is alert and oriented to person, place, and time  Mental status is at baseline  Cranial Nerves: No cranial nerve deficit  Sensory: No sensory deficit  Motor: No weakness     Psychiatric:         Mood and Affect: Mood normal          Behavior: Behavior normal        Operative Scheduling Information:    Hospital:  Any IR/endo suite    Physician:  Any available surgeon or IR doc    Surgery: Abdominal aortogram with left lower extremity run off, possible intervention     Urgency:  Standard    Level:  Level 2: Outpatients to be scheduled for surgery with time dependent medical necessity within 2 weeks    Case Length:  Normal    Post-op Bed:  Outpatient    OR Table:  IR    Equipment Needs:  None    Medication Instructions:  Aspirin:   Continue (do not hold)  Xarelto:  Hold for 2 days prior to procedure    Hydration:  No

## 2021-05-13 NOTE — TELEPHONE ENCOUNTER
Patient is scheduled with Dr Luzma Rothman 5/27/21 SL[facility]/IR  Called patient and unable to [facility] for Cell phone  Called home phone and LMOM

## 2021-05-17 NOTE — TELEPHONE ENCOUNTER
Unable to leave message on his cell phone  LMOM patient's home phone with med instructions and date/site/time  Requested a confirmation call back

## 2021-05-19 NOTE — TELEPHONE ENCOUNTER
Is patient requesting a call when authorization has been obtained? Patient did not request a call  Surgery Date: 5-27-21  Primary Surgeon: TO BE DONE BY IR   Assisting Surgeon: Not Applicable (N/A)  Facility: [facility] (Tax: [id number] / NPI: [id number])  Inpatient / Outpatient: Outpatient  Level: 2    Clearance Received: No clearance ordered  Consent Received: Consent will be signed day of procedure  Medication Hold / Last Dose: Yes Xarelto 2 days prior last dose 5-24-21  VQI Spreadsheet: Not Applicable (N/A)  IR Notified: Yes  Rep  Notified: Not Applicable (N/A)  Equipment Needs: Not Applicable (N/A)  Vas Lab Requested: Not Applicable (N/A)  Patient Contacted: 5-19-21    Diagnosis: I73 9  Procedure/ CPT Code(s): Angiogram // CPT: 22086 [id number]    For varicose vein related procedures, last LEVDR? Not Applicable (N/A)    Post Operative Date/ Time: 6-10-21 , 11:00am Caswell Gaucher) with Doctor, Gina Alvarado (NPI: [id number])     *Please review with patient med hold, PATs, and check H&P *  PATIENT WAS MAILED SURGERY/SHOWERING/DISCHARGE/COVID INSTRUCTIONS AFTER REVIEWING WITH HER VIA PHONE CALL       Spoke to patient with the date for procedure and location patient was told about the medication hold as well LLF

## 2021-05-24 NOTE — TELEPHONE ENCOUNTER
Patient called to confirm his medications that are being held No Hold on ASA and Hold Xarelto 2 days prior last dose is 5-24-21 Patient confirmed and understood the instructions   LLF

## 2021-05-26 NOTE — TELEPHONE ENCOUNTER
Patients daughter called to make sure her fathers procedure is still on for tomorrow I told that he is   LLF

## 2021-05-27 NOTE — BRIEF OP NOTE (RAD/CATH)
IR LOWER EXTREMITY ANGIOGRAM  Procedure Note    PATIENT NAME: Katie Manrique  : 1934  MRN: [id number]     Pre-op Diagnosis:   1  PAD (peripheral artery disease) ([facility])      Post-op Diagnosis:   1  PAD (peripheral artery disease) [facility])        Surgeon:   Nirmal Brink DO  Assistants:     No qualified resident was available  Estimated Blood Loss: none  Findings:   · R CFA access, closed with Vascade  · Multifocal mod-high grade L SFA ISRs with robust luminal gain s/p 6 mm x 20 cm POBA and 6 mm x 22 cm DCB Lutonix  · Single vessel L peroneal with collateralization to DP    Specimens: none    Complications:  none    Anesthesia: conscious sedation and local    Nirmal Brink DO     Date: 2021  Time: 12:54 PM         Vascular Quality Initiative - Peripheral Vascular Intervention     Urgency: Elective    Functional Status:  Fully active; able to carry on all predisease activities without restriction  Ambulation: Amb = independently ambulatory    Leg Symptoms    Right: Asymptomatic:  documented peripheral arterial disease without symptoms of claudication or ischemic pain      Treatment of Native Artery to Maintain Bypass Patency?:  No  Left: Moderate Claudication:  ischemic limb muscle pain that limits walking 1-2 blocks (300-600 feet, or 1-2 football fields)    COVID Information  COVID Symptoms Pre-Procedure: Asymptomatic    Treatment Delayed by Pandemic: None    Access   Number of Sites: 1     Access Site 1:     Side 1: Right    Site 1: Femoral Retrograde    Access Guidance 1:U/S    Largest Sheath Size 1: 6 Fr      Closure Device 1: Vascade      Number of Closure Devices: 1     Closure Device Outcome: Closure device successful         Procedure  Fluoro Time: 13 1 minutes  Contrast Volume: Visipaque 91 ml  CO2: no  Anticoagulant: Heparin  Protamine: No  If Creatinine is > 1 2 or missing, VICTOR MANUEL Prophylaxis none     Treatment Details  Indication: Occlusive Disease,    Completion Assessment  Artery 1 treated: SFA+Pop   Left               Outflow: AT,PT,Peroneal: 1                       Segments treated: P1                      Was this Site previously treated?: Yes, Stent          TASC Grade: C          Total Treated Length: 20 cm          Total Occluded Length: 20 cm          Calcification: Focal (calcification on one side of artery < half length of lesion)          Number of Treatment types (Devices):   1           Device 1          Treatment Type: Special Balloon,  Drug Coated Balloon                Diameter: 6 mm          Length: 22 mm            Concomitant: None          Technical result: Successful (stenosis <=30%)      Artery 2 treated: SFA        Left               Outflow: AT,PT,Peroneal: 0                  Was this Site previously treated?: Yes, Stent          TASC Grade: C          Total Treated Length: 20 cm           Total Occluded Length: 20 cm          Calcification: Focal (calcification on one side of artery < half length of lesion)          Number of Treatment types (Devices):   1           Device 1          Treatment Type: Special Balloon,  Drug Coated Balloon                Diameter: 6 mm          Length: 22 mm            Concomitant: None          Technical result: Successful (stenosis <=30%)       None     Post Procedure  Procedure Complications: No

## 2021-05-27 NOTE — DISCHARGE INSTRUCTIONS
ARTERIOGRAM    WHAT YOU SHOULD KNOW:   An angiogram is a procedure to look at arteries in your body  Arteries are the blood vessels that carry blood from your heart to your body  AFTER YOU LEAVE:     Self-care:   · Limit activity: Rest for the remainder of the day of your procedure  Have some one with you until the next morning  Keep your arm or leg straight as much as possible  Rest as much as possible, sitting lying or reclining  Walk only to go to the bathroom, to bed or to eat  If the angiogram catheter was put in your leg, use the stairs as little as possible  No driving  · Keep your wound clean and dry  You may shower 24 hours after your procedure  The bandage you have on should fall off in 2-3 days  If there is any drainage from the puncture site, you should put on a clean bandage  · Watch for bleeding and bruising: It is normal to have a bruise and soreness where the angiogram catheter went in  · Diet:   · You may resume your regular diet, Sips of flat soda will help with mild nausea  · Drink more liquids than usual for the next 24 hours      · IMMEDIATELY Contact Interventional Radiology at [phone] [place] PATIENTS: Contact Interventional Radiology at [phone]) Erin Lyles PATIENTS: Contact Interventional Radiology at [phone]) if any of the following occur:  · If your bruise gets larger or if you notice any active bleeding  APPLY DIRECT PRESSURE TO THE BLEEDING SITE  · If you notice increased swelling or have increased pain at the puncture site   · If you have any numbness or pain in the extremity of the puncture site   · If that extremity seems cold or pale      · You have fever greater than 101  · Persistent nausea or vomitting    Follow up with your primary healthcare provider  as directed: Write down your questions so you remember to ask them during your visits               Moderate Sedation   WHAT YOU NEED TO KNOW:   Moderate sedation, or conscious sedation, is medicine used during procedures to help you feel relaxed and calm  You will be awake and able to follow directions without anxiety or pain  You will remember little to none of the procedure  You may feel tired, weak, or unsteady on your feet after you get sedation  You may also have trouble concentrating or short-term memory loss  These symptoms should go away in 24 hours or less  DISCHARGE INSTRUCTIONS:   Call 911 or have someone else call for any of the following:   · You have sudden trouble breathing  · You cannot be woken  Seek care immediately if:   · You have a severe headache or dizziness  · Your heart is beating faster than usual   Contact your healthcare provider if:   · You have a fever  · You have nausea or are vomiting for more than 8 hours after the procedure  · Your skin is itchy, swollen, or you have a rash  · You have questions or concerns about your condition or care  Self-care:   · Have someone stay with you for 24 hours  This person can drive you to errands and help you do things around the house  This person can also watch for problems  · Rest and do quiet activities for 24 hours  Do not exercise, ride a bike, or play sports  Stand up slowly to prevent dizziness and falls  Take short walks around the house with another person  Slowly return to your usual activities the next day  · Do not drive or use dangerous machines or tools for 24 hours  You may injure yourself or others  Examples include a lawnmower, saw, or drill  Do not return to work for 24 hours if you use dangerous machines or tools for work  · Do not make important decisions for 24 hours  For example, do not sign important papers or invest money  · Drink liquids as directed  Liquids help flush the sedation medicine out of your body  Ask how much liquid to drink each day and which liquids are best for you  · Eat small, frequent meals to prevent nausea and vomiting   Start with clear liquids such as juice or broth  If you do not vomit after clear liquids, you can eat your usual foods  · Do not drink alcohol or take medicines that make you drowsy  This includes medicines that help you sleep and anxiety medicines  Ask your healthcare provider if it is safe for you to take pain medicine  Follow up with your healthcare provider as directed: Write down your questions so you remember to ask them during your visits  © 2017 [employer] Information is for End User's use only and may not be sold, redistributed or otherwise used for commercial purposes  All illustrations and images included in CareNotes® are the copyrighted property of [employer]  or [employer]  The above information is an  only  It is not intended as medical advice for individual conditions or treatments  Talk to your doctor, nurse or pharmacist before following any medical regimen to see if it is safe and effective for you

## 2021-05-27 NOTE — SEDATION DOCUMENTATION
LLE angiogram with angioplasty performed by Dr Jazmin Gonzalez without complications  Right CFA access obtained, closed with Vascade closure device  Tolerated well by patient  VSS throughout  IR Procedure Bedrest Start Time is 1300

## 2021-05-27 NOTE — INTERVAL H&P NOTE
H&P reviewed  After examining the patient, I find no changed to the H&P since it had been written  /71 (BP Location: Left arm)   Pulse 72   Resp 16   SpO2 100%     Patient re-evaluated   Accept as history and physical     Caty Dimas, DO/May 27, 2021/11:30 AM

## 2021-06-10 NOTE — PROGRESS NOTES
Assessment/Plan:    Pt is an 79 yo M w/ HLD, afib, HTN, HLD, bladder cancer, lung cancer, venous disease, AAA, carotid stenosis, PAD w/ LLE claudication s/p agram w/ L SFA/pop PTA 5/29/20 ([facility]), with recurrent claudication s/p agram w/ L CFA PTA and L SFA/pop PTA/stent 10/9/20 s/p RLE agram w/ R SFA/pop/profunda PTA 11/4/20, and now s/p agram w/ L SFA/pop PTA DCB (Noni) 5/27/21  Asymptomatic bilateral carotid artery stenosis  -     CTA neck w wo contrast; Future  -B carotid bruit on exam  -reviewed DU from 11/19 which showed B ICA <50% stenosis ([facility])   -reviewed DU which shows R ICA <50% and L ICA >70% w/ elvis 473/103  -this is concerning given both the level of stenosis as well as the rapid increase in the disease process; deferred treatment at last visit as he was undergoing treatment for bladder cancer and has untreated lung cancer; he is now recovered from his bladder surgery, does not appear to have any further treatment planned, and is stable from a lung cancer perspective, although he has not followed up with onc since last year for this; we discussed the risks and benefits of surgery and medical management for high grade but asymptomatic carotid stenosis; patient is undecided on treatment right now but is considering surgery  -we will get CTA neck to better evaluate his anatomy and then followup to discuss the specifics of surgery and he will make a final decision regarding treatment    Atherosclerosis of native artery of both lower extremities with intermittent claudication (HCC)  PAD (peripheral artery disease) (HCC)  -     aspirin (ECOTRIN LOW STRENGTH) 81 mg EC tablet; Take 1 tablet (81 mg total) by mouth daily  -     atorvastatin (LIPITOR) 20 mg tablet;  Take 1 tablet (20 mg total) by mouth daily  -     VAS lower limb arterial duplex, complete bilateral; Future  -reviewed pre-procedure ABIs from [facility]: 1 05/0 39  -now s/p LLE angiogram w/ L SFA/pop PTA 5/29/20 [facility]  -claudication resolved x 1 month and returned; L>R claudication  -s/p LLE angiogram w/ L CFA PTA, L SFA/pop PTA and stents for restenosis; peroneal only runoff 10/9/20 Me  -s/p RLE angiogram w/ R SFA/pop/profunda PTA; peroneal runoff which collateralizes to DP 11/4/20 Me  -recurrent LLE claudication and decreased BRITTANY  -now s/p LLE angiogram w/ L SFA/pop PTA w/ DCB for restenosis by Dr Chong Selby 5/27/21; peroneal runoff (does have reconned DP)  -BLE claudication resolved; does have significant R fem access site bruising but pain nearly resolved  -will continue surveillance with next DU in 3 months    Abdominal aortic aneurysm (AAA)   -     VAS ABDOMINAL AORTA/ILIACS; WITH BRITTANY'S; Future  -reviewed noncon CT from 12/20 which shows 4 8cm AAA (prior PET 4 2cm); aneurysm is eccentric  -reviewed AOIL also from 4/21 which shows 4 8cm which is stable  -will continue to monitor this on a q6mos basis (due 12/21)    Venous insufficiency  -evidence of venous disease and single episode of bleeding from L lateral malleolus  -advised to hold pressure and elevate if further bleeding occurs and to call the office for appt  -prior bleeding site now well healed; if bleeding occurred again, would plan for sclero injection; could not visualize feeding vessel on last visit  -continue compression, elevation for medical management    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 ([facility] [address] )  -underwent TURBT 1/15/21 by Dr David Taylor; 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  -underwent TURP for urinary retention 3/2/21 by Dr Adilene Holland; path showing BPH  -f/u next week but per patient, no further treatments planned    Malignant neoplasm of upper lobe of right lung   -followed by onc at [facility] with surveillance imaging; no current evidence of metastatic disease; might be a candidate for targeted treatment but has not agreed to bx or genetic testing  -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  -patient continues high dose vitamin C infusions as his only treatment    Permanent atrial fibrillation (HCC)  Hyperlipidemia, unspecified hyperlipidemia type  Medications        -     rosuvastatin (CRESTOR) 20 MG tablet; Take 1 tablet (20 mg total) by mouth daily  -cont ASA  -also continue Xarelto for afib (on half dose, unclear why)  -patient tried atorvastatin and reports numerous side effects; reluctant but willing to try another statin; Rx sent    Subjective:      Patient ID: Fer Vanegas is a 80 y o  male  Patient presents in office to review the results of his Agram done on 5/27/2021  Patient reports since the procedure his legs have improved and he is able to walk without as much pain  Patient reports some ankle swelling still, but that goes down with elevation  Patient reports that he still has some R leg bruising and tenderness  Patient reports that the R groin incision is clean and dry  Patient is taking xarelto  HPI:    Patient returns after recent angiogram and to discuss carotid disease    Patient initially complained of claudication in his L calf with ambulation  He would have to stop and rest every 2-3min while walking  He had an angiogram procedure with Dr Berta Garcia and initially had complete relief of his symptoms  This lasted for ~1 month and then had recurrance of his LLE claudication at about 3min of walking  He then underwent angiogram and LLE claudication again completely resolved  He was then limited by his RLE claudication which occured at [facility]  He continued to have some numbness of his feet which is intermittent  s/p RLE angiogram with resolution of symptoms including right foot numbness  He then again had recurrent LLE symptoms and most recently underwent repeat LLE angiogram with Dr Kari Suazo  Since then, he has only walked a few blocks but denies any claudication  He has significant bruising of the right thigh but only minimal discomfort remaining  Denies rest pain, numbness      He has hx of an episode of bleeding from the L lateral malleolus  He had a scab there that he had picked off the day prior  No further episodes of this  The scabbed area is now completely healed  He has some BLE edema  He wears compression sometimes, but not elevating his legs  Patient also has known MARLENA NSCLC which he is choosing to treat with vitamin C injections rather than the recommended staging and targeted treatment options  He says "it's working" for him because his cancer hasn't metastasized  He also takes many supplements  He is being followed at [facility] by heme/onc but continues to get vitamin C injections from [facility]  His mass is stable since last exam but has doubled in size over time  Currently no evidence of distant mets  Last seen by onc 10/20    He had hematuria and was found to have bladder masses  S/p TURBT  He had a recent cystoscopy and switched his care to Dr Jha Sensing  He has followup with him next week  Per patient, no further treatment planned  Path showed low grade papillary urothelial carcinoma  He is taking Xarelto for afib but only 10mg; He also takes ASA  Also taking multiple supplements, some of which he reports have anticoagulant properties  He tried statin but thought this caused upset stomach and appetite suppression and reflux  Denies amaurosis, facial droop, garbled, unilateral weakness/numbness  Denies hx of stroke  Prior remote smoker  The following portions of the patient's history were reviewed and updated as appropriate: allergies, current medications, past family history, past medical history, past social history, past surgical history and problem list     Review of Systems   Constitutional: Negative  Negative for chills and fever  HENT: Negative  Negative for hearing loss and trouble swallowing  Eyes: Negative  Negative for visual disturbance  Respiratory: Negative  Negative for cough, chest tightness and shortness of breath      Cardiovascular: Positive for leg swelling  Negative for chest pain  Gastrointestinal: Negative  Negative for diarrhea, nausea and vomiting  Endocrine: Negative  Genitourinary: Negative  Musculoskeletal: Negative  Negative for gait problem (claudication resolved)  Skin: Positive for color change  Negative for wound  Allergic/Immunologic: Negative  Neurological: Negative  Negative for dizziness, facial asymmetry, speech difficulty, weakness, numbness and headaches  Hematological: Bruises/bleeds easily  Psychiatric/Behavioral: Negative  Negative for self-injury and suicidal ideas  Objective:      /62 (BP Location: Right arm, Patient Position: Sitting, Cuff Size: Adult)   Pulse 84   Temp 98 °F (36 7 °C) (Tympanic)   Resp 16   Ht 5' 7" (1 702 m)   Wt 72 6 kg (160 lb)   BMI 25 06 kg/m²          Physical Exam  Vitals signs and nursing note reviewed  Constitutional:       Appearance: He is well-developed  HENT:      Head: Normocephalic and atraumatic  Eyes:      Conjunctiva/sclera: Conjunctivae normal    Neck:      Musculoskeletal: Normal range of motion and neck supple  Cardiovascular:      Rate and Rhythm: Normal rate and regular rhythm  Pulses:           Carotid pulses are on the right side with bruit and on the left side with bruit  Radial pulses are 2+ on the right side and 2+ on the left side  Femoral pulses are 2+ on the right side and 2+ on the left side  Popliteal pulses are 0 on the right side  Dorsalis pedis pulses are 2+ on the right side and 0 on the left side  Posterior tibial pulses are 0 on the right side and 0 on the left side  Heart sounds: Murmur present  Comments: R femoral access site with slightly prominent pulse but no pain with palpation, no bruit, no expansile pulse  Pulmonary:      Effort: Pulmonary effort is normal  No respiratory distress  Breath sounds: Normal breath sounds  No wheezing or rales     Abdominal:      General: There is no distension  Palpations: Abdomen is soft  There is pulsatile mass (no tenderness)  Tenderness: There is no abdominal tenderness  There is no rebound  Musculoskeletal: Normal range of motion  Right lower le+ Edema present  Left lower le+ Edema present  Skin:     General: Skin is warm and dry  Comments: B feet without wounds  Diffuse spiders and retics; dense spiders to B feet L>R;     R thigh with resolving ecchymosis   Neurological:      Mental Status: He is alert and oriented to person, place, and time  Psychiatric:         Behavior: Behavior normal            I have reviewed and made appropriate changes to the review of systems input by the medical assistant      Vitals:    06/10/21 1107   BP: 124/62   BP Location: Right arm   Patient Position: Sitting   Cuff Size: Adult   Pulse: 84   Resp: 16   Temp: 98 °F (36 7 °C)   TempSrc: Tympanic   Weight: 72 6 kg (160 lb)   Height: 5' 7" (1 702 m)       Patient Active Problem List   Diagnosis    Abdominal aortic aneurysm (AAA)     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)    Malignant neoplasm of upper lobe of right lung     Atrial fibrillation    Benign essential hypertension    Hemangioma of liver    Hyperlipidemia    Venous insufficiency    Asymptomatic bilateral carotid artery stenosis    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)    BPH with urinary obstruction    Bladder diverticulum    Gross hematuria    Hematuria    Suspected UTI    Acute blood loss anemia    TENZIN (acute kidney injury)     Hypokalemia    Thrombocytopenia     PAD (peripheral artery disease) (HCC)       Past Surgical History:   Procedure Laterality Date    BOWEL RESECTION      COLONOSCOPY      HERNIA REPAIR      X2    IR AORTAGRAM WITH RUN-OFF  2020    SEVERAL    IR AORTAGRAM WITH RUN-OFF  10/9/2020    IR LOWER EXTREMITY ANGIOGRAM  2020    IR LOWER EXTREMITY ANGIOGRAM 2021    SD CYSTOURETHROSCOPY W/IRRIG & EVAC CLOTS N/A 2021    Procedure: CYSTOSCOPY EVACUATION OF Alok Jasper;  Surgeon: Bryant Khan MD;  [facility];  Service: Urology    SD CYSTOURETHROSCOPY,FULGUR 2-5 CM LESN N/A 1/15/2021    Procedure: CYSTOSCOPY, TRANSURETHRAL RESECTION OF BLADDER TUMOR (TURBT);   Surgeon: Tanvi Rodriguez MD;  Location: [address] [place] [address];  Service: Urology    SD [facility] 3RD+ ORD SLCTV ABDL PEL/LXTR [facility] Left 10/9/2020    Procedure: ARTERIOGRAM Left lower extremity agram right femoral access with SFA stenting/angioplasty;  Surgeon: Lexie Simmons MD;  [facility];  Service: Vascular    SD [facility] 3RD+ ORD SLCTV ABDL PEL/LXTR [facility] Right 2020    Procedure: ARTERIOGRAM RLE angiogram L fem access;  Surgeon: Juanita Brambila MD;  [facility];  Service: Vascular    SD TRANSURETHRAL ELEC-SURG PROSTATECTOM N/A 3/2/2021    Procedure: CYSTOSCOPY, TRANSURETHRAL RESECTION OF Adele Shield;  Surgeon: Tanvi Rodriguez MD;  Location: [address] [place] [address];  Service: Urology    PROSTATE SURGERY      REMOVAL VENOUS PORT (PORT-A-CATH)         Family History   Problem Relation Age of Onset    Alzheimer's disease Mother     Cancer Father        Social History     Socioeconomic History    Marital status: Single     Spouse name: Not on file    Number of children: Not on file    Years of education: Not on file    Highest education level: Not on file   Occupational History    Not on file   Social Needs    Financial resource strain: Not on file    Food insecurity     Worry: Not on file     Inability: Not on file    Transportation needs     Medical: Not on file     Non-medical: Not on file   Tobacco Use    Smoking status: Former Smoker     Years: 30 00     Types: Cigarettes     Quit date: 1988     Years since quittin 4    Smokeless tobacco: Never Used   Substance and Sexual Activity    Alcohol use: Yes     Comment: SOCIAL    Drug use: Never  Sexual activity: Never   Lifestyle    Physical activity     Days per week: Not on file     Minutes per session: Not on file    Stress: Not on file   Relationships    Social connections     Talks on phone: Not on file     Gets together: Not on file     Attends [employer] service: Not on file     Active member of club or organization: Not on file     Attends meetings of clubs or organizations: Not on file     Relationship status: Not on file    Intimate partner violence     Fear of current or ex partner: Not on file     Emotionally abused: Not on file     Physically abused: Not on file     Forced sexual activity: Not on file   Other Topics Concern    Not on file   Social History Narrative    Not on file       Allergies   Allergen Reactions    Sulfa Antibiotics Shortness Of Breath     Other reaction(s): Other (See Comments)  SOB      Protamine      Other reaction(s):  Other (See Comments)  Did not work for patient           Current Outpatient Medications:     belladonna-opium (B&O SUPPOSITORY) 16 2-30 mg suppository, Insert 1 suppository into the rectum every 8 (eight) hours as needed for bladder spasmsMax Daily Amount: 3 suppositories, Disp: 10 suppository, Rfl: 0    finasteride (PROSCAR) 5 mg tablet, Take 5 mg by mouth daily, Disp: , Rfl:     hydrochlorothiazide (HYDRODIURIL) 25 mg tablet, Take 25 mg by mouth daily, Disp: , Rfl:     metoprolol tartrate (LOPRESSOR) 25 mg tablet, Take 25 mg by mouth daily , Disp: , Rfl:     rivaroxaban (Xarelto) 10 mg tablet, Take 10 mg by mouth daily at bedtime To check with surgeon when to hold RX  Pt advised to stop after 2/26/21, Disp: , Rfl:     aspirin (ECOTRIN LOW STRENGTH) 81 mg EC tablet, Take 1 tablet (81 mg total) by mouth daily, Disp:  , Rfl:     atorvastatin (LIPITOR) 20 mg tablet, Take 1 tablet (20 mg total) by mouth daily, Disp: 90 tablet, Rfl: 4

## 2021-06-10 NOTE — PATIENT INSTRUCTIONS
1) PAD  -you had a successful procedure to treat the blockages in the left and right legs  -your ultrasound shows excellent blood flow and you are walking without symptoms  -we will monitor this on a yearly basis    2) AAA  -you have an abdominal aortic aneurysm which is 4 8cm in size  -we will monitor this with ultrasound on a 6month basis  -once it reaches 5 5cm in size, we will discuss repair  -if you have sudden onset of severe abdominal and back pain, please come to the hospital for evaluation    3) Carotid stenosis  -your ultrasound showed a severe blockage in the artery on the left  -we discussed possible surgery for this to prevent strokes  -we are getting a cat scan to better define the blockage and then we will discuss possible surgery    4) Venous disease  -if you have any more episodes of bleeding from your veins, put direct pressure on it with your finger and elevate you leg in the air as high as you can  -call my office once the bleeding stops  -if this happens again, we will plan to inject it with a solution to prevent more bleeding  -wearing compression socks with help with the swelling in your legs (gradient compression, at least 15-20mmHg); you need to wear these daily to be affective; elevating your legs will also help    5) Medications  -please continue taking your Xarelto for your afib  -please continue taking aspirin 81mg once daily  -please try the new statin medication that I ordered and take this with meals

## 2021-06-15 NOTE — PROGRESS NOTES
6/15/2021    Ernestina Ashley  1934  [id number]        Assessment   history of BPH status post TURP, status post recent cystoscopy with clot evacuation and fulguration,  Peripheral vascular disease status post lower extremity arterial stenting, history of lung cancer      Discussion    We reviewed his pathology showing a low-grade superficial bladder cancer from January 2021  As I recently performed cystoscopy and did not see any recurrent tumor within the bladder his next cystoscopy will be in 6 months time  He was instructed to call sooner if he were to develop gross hematuria as he is on blood thinners for his peripheral vascular disease        History of Present Illness  80 y o  male with a history of   BPH  He previously underwent TURP with Dr David Taylor as well as TURBT to remove a small lesion   In January 2021  Pathology was consistent with a low-grade superficial bladder tumor  Pathology from March 2021 reveals 6 g of benign tissue from the TURP with Dr David Taylor  He states that he has lung cancer which he is treating with vitamins and supplements  He has previously seen a medical oncologist but has not received surgery/ radiation/ chemotherapy  He also has lower extremity peripheral vascular disease  AUA Symptom Score      Review of Systems  Review of Systems   Constitutional: Negative  HENT: Negative  Eyes: Negative  Respiratory: Negative  Cardiovascular: Negative  Gastrointestinal: Negative  Endocrine: Negative  Genitourinary:        Per HPI   Musculoskeletal: Negative  Skin: Negative  Allergic/Immunologic: Negative  Neurological: Negative  Hematological: Negative  Psychiatric/Behavioral: Negative  All other systems reviewed and are negative          Past Medical History  Past Medical History:   Diagnosis Date    AAA [facility]     Atrial fibrillation ([facility]     [facility] 2014    lung ca- right lung  stage 3     [facility]ot[facility] disease [facility]     Hyperlipidemia     Hypertension     [facility]ipheral [facility]     Pneumonia     PVD (peripheral vascular disease) ([facility]        Past Social History  Past Surgical History:   Procedure Laterality Date    BOWEL RESECTION      COLONOSCOPY      HERNIA REPAIR      X2    IR AORTAGRAM WITH RUN-OFF  5/29/2020    SEVERAL    IR AORTAGRAM WITH RUN-OFF  10/9/2020    IR LOWER EXTREMITY ANGIOGRAM  11/4/2020    IR LOWER EXTREMITY ANGIOGRAM  5/27/2021    HI CYSTOURETHROSCOPY W/IRRIG & EVAC CLOTS N/A 4/16/2021    Procedure: CYSTOSCOPY EVACUATION OF Frutoso Honer;  Surgeon: Richi Mullins MD;  Location:  MAIN OR;  Service: Urology    HI CYSTOURETHROSCOPY,FULGUR 2-5 CM LESN N/A 1/15/2021    Procedure: CYSTOSCOPY, TRANSURETHRAL RESECTION OF BLADDER TUMOR (TURBT);   Surgeon: Giorgi Rousseau MD;  Location: [address] [place] [address];  Service: Urology    HI Amrita Mercury 3RD+ ORD SLCTV ABDL PEL/[facility] Left 10/9/2020    Procedure: ARTERIOGRAM Left lower extremity agram right femoral access with SFA stenting/angioplasty;  Surgeon: Gina Simmons MD;  Location:  MAIN OR;  Service: Vascular    HI Amrita Mercury 3RD+ ORD SLCTV ABDL PEL/[facility] Right 11/4/2020    Procedure: ARTERIOGRAM RLE angiogram L fem access;  Surgeon: Adele Houser MD;  Location:  MAIN OR;  Service: Vascular    HI TRANSURETHRAL ELEC-SURG PROSTATECTOM N/A 3/2/2021    Procedure: Daquan Franchesca RESECTION OF Nataliya Gonzalez;  Surgeon: Giorgi Rousseau MD;  Location: [place] [facility];  Service: Urology    PROSTATE SURGERY      REMOVAL VENOUS PORT (PORT-A-CATH)         Past Family History  Family History   Problem Relation Age of Onset    Alzheimer's disease Mother     Cancer Father        Past Social history  Social History     Socioeconomic History    Marital status: Single     Spouse name: Not on file    Number of children: Not on file    Years of education: Not on file    Highest education level: Not on file Occupational History    Not on file   Tobacco Use    Smoking status: Former Smoker     Years: 30 00     Types: Cigarettes     Quit date: 1988     Years since quittin 4    Smokeless tobacco: Never Used   Vaping Use    Vaping Use: Never used   Substance and Sexual Activity    Alcohol use: Yes     Comment: SOCIAL    Drug use: Never    Sexual activity: Never   Other Topics Concern    Not on file   Social History Narrative    Not on file     Social Determinants of Health     Financial Resource Strain:     Difficulty of Paying Living Expenses:    Food Insecurity:     Worried About Running Out of Food in the Last Year:     [address] in the Last Year:    Transportation Needs:     Lack of Transportation (Medical):      Lack of Transportation (Non-Medical):    Physical Activity:     Days of Exercise per Week:     Minutes of Exercise per Session:    Stress:     Feeling of Stress :    Social Connections:     Frequency of Communication with Friends and Family:     Frequency of Social Gatherings with Friends and Family:     Attends Christian Services:     Active Member of Clubs or Organizations:     Attends Club or Organization Meetings:     Marital Status:    Intimate Partner Violence:     Fear of Current or Ex-Partner:     Emotionally Abused:     Physically Abused:     Sexually Abused:        Current Medications  Current Outpatient Medications   Medication Sig Dispense Refill    aspirin (ECOTRIN LOW STRENGTH) 81 mg EC tablet Take 1 tablet (81 mg total) by mouth daily      atorvastatin (LIPITOR) 20 mg tablet Take 1 tablet (20 mg total) by mouth daily 90 tablet 4    belladonna-opium (B&O SUPPOSITORY) 16 2-30 mg suppository Insert 1 suppository into the rectum every 8 (eight) hours as needed for bladder spasmsMax Daily Amount: 3 suppositories 10 suppository 0    finasteride (PROSCAR) 5 mg tablet Take 5 mg by mouth daily      hydrochlorothiazide (HYDRODIURIL) 25 mg tablet Take 25 mg by mouth daily      metoprolol tartrate (LOPRESSOR) 25 mg tablet Take 25 mg by mouth daily       rivaroxaban (Xarelto) 10 mg tablet Take 10 mg by mouth daily at bedtime To check with surgeon when to hold RX  Pt advised to stop after 2/26/21       No current facility-administered medications for this visit  Allergies  Allergies   Allergen Reactions    Sulfa Antibiotics Shortness Of Breath     Other reaction(s): Other (See Comments)  SOB      Protamine      Other reaction(s): Other (See Comments)  Did not work for patient         Past Medical History, Social History, Family History, medications and allergies were reviewed  Vitals  There were no vitals filed for this visit  Physical Exam  Physical Exam    On examination he is in no acute distress  His abdomen is soft nontender nondistended   examination reveals no CVA tenderness  Skin is warm with ecchymosis especially on the right thigh   Gait normal   Affect normal      Results  No results found for: PSA  Lab Results   Component Value Date    GLUCOSE 98 04/16/2021    CALCIUM 8 3 04/18/2021    K 4 0 04/18/2021    CO2 27 04/18/2021     04/18/2021    BUN 12 04/18/2021    CREATININE 0 72 04/18/2021     Lab Results   Component Value Date    WBC 6 81 04/17/2021    HGB 8 8 (L) 04/18/2021    HCT 26 7 (L) 04/18/2021    MCV 95 04/17/2021     (L) 04/17/2021         Office Urine Dip  No results found for this or any previous visit (from the past 1 hour(s)) ]

## 2021-06-25 NOTE — TELEPHONE ENCOUNTER
Contacted Francisco Javier and expplained that orders were placed for new urine studies  He will go to [facility] to have it done  Also encouraged him to hydrate with water  He is to call the who prescribe Zarelto to see if he can hold it a few days and if this helps with bleeding  Sixto explained that if his urine is bright red he should go to the ER   He understood

## 2021-06-25 NOTE — TELEPHONE ENCOUNTER
Recommend patient go for repeat urine testing  If able, would consider having patient contact his ordering prescriber for anticoagulants to see if this can be held  Please review ER precautions  His next cystoscopy is due in December, or sooner if hematuria persists

## 2021-06-25 NOTE — TELEPHONE ENCOUNTER
pts care is managed by Dr Head  pt was  last seen 6/15/21  Pt is calling for urine testing results dated 6/15/21  Labs are completed  Culture was contaminated  Dysuria    Pt states he was seen 6/15/21 for painful urination and blood in his urine  Haydee Blend He's stating the blood had subsided for about 7 days past the appointment  Pt states on 6/22/21 blood in his urine returned  Pt states the blood is pink in color and occurs every time he urinates  Pt is well hydrated with no other complaints  Pt will continue to monitor his symptoms  Place order for another ua and urine culture ?     Please advise thank you

## 2021-06-28 NOTE — TELEPHONE ENCOUNTER
Received results of patient's recent urine culture which is positive for infection  Prescription for Keflex was electronically sent to his pharmacy

## 2021-07-14 NOTE — PROGRESS NOTES
Assessment/Plan:    Pt is an 81 yo M w/ HLD, afib, HTN, HLD, bladder cancer, lung cancer, venous disease, AAA, carotid stenosis, PAD w/ LLE claudication s/p agram w/ L SFA/pop PTA 5/29/20 ([facility]), with recurrent claudication s/p agram w/ L CFA PTA and L SFA/pop PTA/stent 10/9/20 s/p RLE agram w/ R SFA/pop/profunda PTA 11/4/20, and now s/p agram w/ L SFA/pop PTA DCB (Noni) 5/27/21 presents to discuss carotid disease    Asymptomatic bilateral carotid artery stenosis  -reviewed DU from 11/19 which showed B ICA <50% stenosis (LVH)   -reviewed DU which shows R ICA <50% and L ICA >70% w/ elvis 473/103  -reviewed CTA which shows high grade focal stenosis in the L ICA approx 1-1 5cm distal to the bifurcation; does appear anatomically amenable to CEA  -this is concerning given both the level of stenosis as well as the rapid increase in the disease process; deferred treatment at last visit as he was undergoing treatment for bladder cancer and has untreated lung cancer; he is now recovered from his bladder surgery, without any further treatment planned, and is stable from a lung cancer perspective, although he has not followed up with onc since last year for this; we discussed the risks and benefits of surgery and medical management for high grade but asymptomatic carotid stenosis; patient continues to be undecided on treatment right now but is leaning towards medical management; he will discuss this further with his family and will call the office with his decision either way; if he does want to pursue surgery, he will make another appointment for preop planning    Atherosclerosis of native artery of both lower extremities with intermittent claudication ([facility] [address] )  PAD (peripheral artery disease) ([facility] [address] )  -reviewed pre-procedure ABIs from [facility]: 1 05/0 39  -now s/p LLE angiogram w/ L SFA/pop PTA 5/29/20 [facility]  -claudication resolved x 1 month and returned; L>R claudication  -s/p LLE angiogram w/ L CFA PTA, L SFA/pop PTA and stents for restenosis; peroneal only runoff 10/9/20 Me  -s/p RLE angiogram w/ R SFA/pop/profunda PTA; peroneal runoff which collateralizes to DP 11/4/20 Me  -recurrent LLE claudication and decreased BRITTANY  -now s/p LLE angiogram w/ L SFA/pop PTA w/ DCB for restenosis by Dr Elissa Castellanos 5/27/21; peroneal runoff (does have reconned DP)  -BLE claudication resolved  -will continue surveillance with next DU in 3 months (ordered last visit)    Abdominal aortic aneurysm (AAA)   -reviewed noncon CT from 12/20 which shows 4 8cm AAA (prior PET 4 2cm); aneurysm is eccentric  -reviewed AOIL also from 4/21 which shows 4 8cm which is stable  -will continue to monitor this on a q6mos basis (due 12/21, ordered last visit)    Venous insufficiency  -evidence of venous disease and single episode of bleeding from L lateral malleolus  -advised to hold pressure and elevate if further bleeding occurs and to call the office for appt  -prior bleeding site now well healed; if bleeding occurred again, would plan for sclero injection; could not visualize feeding vessel on prior visit  -continue compression, elevation for medical management; given new Rx again for compression; still not wearing any    Malignant neoplasm of upper lobe of right lung   -followed by onc at [facility] with surveillance imaging; no current evidence of metastatic disease; might be a candidate for targeted treatment but has not agreed to bx or genetic testing  -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  -patient continues high dose vitamin C infusions as his only treatment    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 ([facility] [address] )  Gross hematuria  -underwent TURBT 1/15/21 by Dr Zee Diez; 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  -underwent TURP for urinary retention 3/2/21 by Dr Peg Maza; path showing BPH  -recent UTI and hematuria, now resolved  -plan for surveillance w/ cystoscopy    Permanent atrial fibrillation ([address] )  Hyperlipidemia, unspecified hyperlipidemia type  Medications  -cont ASA  -also continue Xarelto for afib (on half dose, unclear why)  -cont statin; restarted last visit and no side effects so far          Subjective:      Patient ID: Crystal Fitch is a 80 y o  male  f/u CTA done 7/9 for carotid stenosis currently denies any tia/stroke like symptoms  He is taking asa 81mg, xarelto , and atorvastatin  He c o bilateral lower extremity swelling taking hctz 25mg, minimal improvement with elevation, does not wear compression stockings often, denies pain  HPI:    Patient returns after recent angiogram and to discuss carotid disease    Patient initially complained of claudication in his L calf with ambulation  He would have to stop and rest every 2-3min while walking  He had an angiogram procedure with Dr Alfonso Gonzalez and initially had complete relief of his symptoms  This lasted for ~1 month and then had recurrance of his LLE claudication at about 3min of walking  He then underwent angiogram and LLE claudication again completely resolved  He was then limited by his RLE claudication which occured [facility]  He continued to have some numbness of his feet which is intermittent  s/p RLE angiogram with resolution of symptoms including right foot numbness  He then again had recurrent LLE symptoms and most recently underwent repeat LLE angiogram with Dr Deb Quinn  Since then, he has only walked a few blocks but denies any claudication  He has significant bruising of the right thigh but only minimal discomfort remaining  Denies rest pain, numbness  He has hx of an episode of bleeding from the L lateral malleolus  He had a scab there that he had picked off the day prior  No further episodes of this  The scabbed area is now completely healed  He has some BLE edema  He wears compression sometimes, but not elevating his legs      Patient also has known MARLENA NSCLC which he is choosing to treat with vitamin C injections rather than the recommended staging and targeted treatment options  He says "it's working" for him because his cancer hasn't metastasized  He also takes many supplements  He is being followed at [facility] by heme/onc but continues to get vitamin C injections from [facility]  His mass is stable since last exam but has doubled in size over time  Currently no evidence of distant mets  Last seen by onc 10/20    He had hematuria and was found to have bladder masses  S/p TURBT  He had a recent cystoscopy and switched his care to Dr Sherri Yang  No further treatment planned  Path showed low grade papillary urothelial carcinoma  Since last visit, had UTI and hematuria which are now resolved  He is taking Xarelto for afib but only 10mg; He also takes ASA  Also taking multiple supplements, some of which he reports have anticoagulant properties  He tried statin but thought this caused upset stomach and appetite suppression and reflux  We restarted trial of lipitor at last visit which he is taking with dinner and having no side effects  Denies amaurosis, facial droop, garbled, unilateral weakness/numbness  Denies hx of stroke  Prior remote smoker  The following portions of the patient's history were reviewed and updated as appropriate: allergies, current medications, past family history, past medical history, past social history, past surgical history and problem list     Review of Systems   Constitutional: Negative  HENT: Negative  Eyes: Negative  Negative for visual disturbance  Respiratory: Negative  Cardiovascular: Positive for leg swelling  Gastrointestinal: Negative  Endocrine: Negative  Genitourinary: Positive for hematuria  Musculoskeletal: Negative  Negative for gait problem  Skin: Negative  Negative for wound  Allergic/Immunologic: Negative  Neurological: Negative  Negative for facial asymmetry, speech difficulty, weakness and numbness  Hematological: Bruises/bleeds easily     Psychiatric/Behavioral: Negative  Objective:      /70 (BP Location: Left arm, Patient Position: Sitting, Cuff Size: Standard)   Pulse 75   Temp (!) 96 9 °F (36 1 °C)   Resp 18   Ht 5' 7" (1 702 m)   Wt 72 7 kg (160 lb 4 oz)   BMI 25 10 kg/m²          Physical Exam  Vitals and nursing note reviewed  Constitutional:       Appearance: He is well-developed  HENT:      Head: Normocephalic and atraumatic  Eyes:      Conjunctiva/sclera: Conjunctivae normal    Cardiovascular:      Rate and Rhythm: Normal rate and regular rhythm  Pulses:           Carotid pulses are on the right side with bruit and on the left side with bruit  Radial pulses are 2+ on the right side and 2+ on the left side  Femoral pulses are 2+ on the right side and 2+ on the left side  Popliteal pulses are 0 on the right side  Dorsalis pedis pulses are 2+ on the right side and 0 on the left side  Posterior tibial pulses are 0 on the right side and 0 on the left side  Heart sounds: Murmur heard  Comments: R femoral access site with slightly prominent pulse but no pain with palpation, no bruit, no expansile pulse  Pulmonary:      Effort: Pulmonary effort is normal  No respiratory distress  Breath sounds: Normal breath sounds  No wheezing or rales  Abdominal:      General: There is no distension  Palpations: Abdomen is soft  There is pulsatile mass (no tenderness)  Tenderness: There is no abdominal tenderness  There is no rebound  Musculoskeletal:         General: Normal range of motion  Cervical back: Normal range of motion and neck supple  Right lower le+ Edema present  Left lower le+ Edema present  Skin:     General: Skin is warm and dry  Comments: B feet without wounds  Diffuse spiders and retics; dense spiders to B feet L>R;     R thigh with resolving ecchymosis   Neurological:      Mental Status: He is alert and oriented to person, place, and time  Psychiatric:         Behavior: Behavior normal            I have reviewed and made appropriate changes to the review of systems input by the medical assistant  Vitals:    07/14/21 1105 07/14/21 1114   BP: 132/60 124/70   BP Location: Right arm Left arm   Patient Position: Sitting Sitting   Cuff Size: Standard Standard   Pulse:  75   Resp: 18    Temp: (!) 96 9 °F (36 1 °C)    Weight: 72 7 kg (160 lb 4 oz)    Height: 5' 7" (1 702 m)        Patient Active Problem List   Diagnosis    Abdominal aortic aneurysm (AAA)     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)    Malignant neoplasm of upper lobe of right lung     Atrial fibrillation    Benign essential hypertension    Hemangioma of liver    Hyperlipidemia    Venous insufficiency    Asymptomatic bilateral carotid artery stenosis    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)    BPH with urinary obstruction    Bladder diverticulum    Gross hematuria    Hematuria    Suspected UTI    Acute blood loss anemia    TENZIN (acute kidney injury)     Hypokalemia    Thrombocytopenia     PAD (peripheral artery disease) (HCC)       Past Surgical History:   Procedure Laterality Date    BOWEL RESECTION      COLONOSCOPY      HERNIA REPAIR      X2    IR AORTAGRAM WITH RUN-OFF  5/29/2020    SEVERAL    IR AORTAGRAM WITH RUN-OFF  10/9/2020    IR LOWER EXTREMITY ANGIOGRAM  11/4/2020    IR LOWER EXTREMITY ANGIOGRAM  5/27/2021    NM CYSTOURETHROSCOPY W/IRRIG & EVAC CLOTS N/A 4/16/2021    Procedure: CYSTOSCOPY EVACUATION OF Jj Spice;  Surgeon: Jermaine Lara MD;  Location: [facility];  Service: Urology    NM CYSTOURETHROSCOPY,FULGUR 2-5 CM LESN N/A 1/15/2021    Procedure: CYSTOSCOPY, TRANSURETHRAL RESECTION OF BLADDER TUMOR (TURBT);   Surgeon: Nicolas Mario MD;  Location: [address] [place] [address];  Service: Urology    NM Zan Parkinson 3RD+ ORD SLCTV ABDL PEL/LXTR [facility] Left 10/9/2020    Procedure: ARTERIOGRAM Left lower extremity agram right femoral access with SFA stenting/angioplasty;  Surgeon: Fermin Simmons MD;  [facility];  Service: Vascular    NC Denver Burrows 3RD+ ORD SLCTV ABDL PEL/LXTR [facility] Right 2020    Procedure: ARTERIOGRAM RLE angiogram L fem access;  Surgeon: Edgar Timmons MD;  [facility];  Service: Vascular    NC TRANSURETHRAL ELEC-SURG PROSTATECTOM N/A 3/2/2021    Procedure: CYSTOSCOPY, TRANSURETHRAL RESECTION OF Андрей Carpenter;  Surgeon: Gael Shelby MD;  Location: [facility];  Service: Urology    PROSTATE SURGERY      REMOVAL VENOUS PORT (PORT-A-CATH)         Family History   Problem Relation Age of Onset    Alzheimer's disease Mother     Cancer Father        Social History     Socioeconomic History    Marital status: Single     Spouse name: Not on file    Number of children: Not on file    Years of education: Not on file    Highest education level: Not on file   Occupational History    Not on file   Tobacco Use    Smoking status: Former Smoker     Years: 30 00     Types: Cigarettes     Quit date: 1988     Years since quittin 5    Smokeless tobacco: Never Used   Vaping Use    Vaping Use: Never used   Substance and Sexual Activity    Alcohol use: Yes     Comment: SOCIAL    Drug use: Never    Sexual activity: Never   Other Topics Concern    Not on file   Social History Narrative    Not on file     Social Determinants of Health     Financial Resource Strain:     Difficulty of Paying Living Expenses:    Food Insecurity:     Worried About Running Out of Food in the Last Year:     [address] in the Last Year:    Transportation Needs:     Lack of Transportation (Medical):      Lack of Transportation (Non-Medical):    Physical Activity:     Days of Exercise per Week:     Minutes of Exercise per Session:    Stress:     Feeling of Stress :    Social Connections:     Frequency of Communication with Friends and Family:     Frequency of Social Gatherings with Friends and Family:     Attends Nondenominational Services:     Active Member of Clubs or Organizations:     Attends Club or Organization Meetings:     Marital Status:    Intimate Partner Violence:     Fear of Current or Ex-Partner:     Emotionally Abused:     Physically Abused:     Sexually Abused: Allergies   Allergen Reactions    Sulfa Antibiotics Shortness Of Breath     Other reaction(s): Other (See Comments)  SOB      Protamine      Other reaction(s):  Other (See Comments)  Did not work for patient           Current Outpatient Medications:     aspirin (ECOTRIN LOW STRENGTH) 81 mg EC tablet, Take 1 tablet (81 mg total) by mouth daily, Disp:  , Rfl:     atorvastatin (LIPITOR) 20 mg tablet, Take 1 tablet (20 mg total) by mouth daily, Disp: 90 tablet, Rfl: 4    finasteride (PROSCAR) 5 mg tablet, Take 5 mg by mouth daily, Disp: , Rfl:     hydrochlorothiazide (HYDRODIURIL) 25 mg tablet, Take 25 mg by mouth daily, Disp: , Rfl:     metoprolol succinate (TOPROL-XL) 25 mg 24 hr tablet, Take 25 mg by mouth daily, Disp: , Rfl:     rivaroxaban (Xarelto) 10 mg tablet, Take 10 mg by mouth daily at bedtime To check with surgeon when to hold RX  Pt advised to stop after 2/26/21, Disp: , Rfl:     belladonna-opium (B&O SUPPOSITORY) 16 2-30 mg suppository, Insert 1 suppository into the rectum every 8 (eight) hours as needed for bladder spasmsMax Daily Amount: 3 suppositories (Patient not taking: Reported on 7/14/2021), Disp: 10 suppository, Rfl: 0    metoprolol tartrate (LOPRESSOR) 25 mg tablet, Take 25 mg by mouth daily , Disp: , Rfl:

## 2021-07-14 NOTE — PATIENT INSTRUCTIONS
1) PAD  -you had a successful procedure to treat the blockages in the left and right legs  -your ultrasound shows excellent blood flow and you are walking without symptoms  -we will monitor this on a yearly basis    2) AAA  -you have an abdominal aortic aneurysm which is 4 8cm in size  -we will monitor this with ultrasound on a 6month basis  -once it reaches 5 5cm in size, we will discuss repair  -if you have sudden onset of severe abdominal and back pain, please come to the hospital for evaluation    3) Carotid stenosis  -your ultrasound showed a severe blockage in the artery on the left  -the CT scan gave me a detailed view of this blockage and allows me to plan a surgery if you decide to have this  -we discussed possible surgery for this to prevent strokes; the surgery is called "carotid endarterectomy"    4) Venous disease  -if you have any more episodes of bleeding from your veins, put direct pressure on it with your finger and elevate you leg in the air as high as you can  -call my office once the bleeding stops  -if this happens again, we will plan to inject it with a solution to prevent more bleeding  -wearing compression socks with help with the swelling in your legs (gradient compression, at least 15-20mmHg); you need to wear these daily to be affective; elevating your legs will also help; try "[employer]" if you would like to buy these online or a medical supply store if you want to use your prescription    5) Medications  -please continue taking your Xarelto for your afib  -please continue taking aspirin 81mg once daily  -please continue taking your new statin medication    Carotid Artery Disease   AMBULATORY CARE:   Carotid artery disease  is a condition that causes narrow or blocked carotid arteries  Your carotid arteries are the blood vessels that supply your brain with most of the blood it needs to work  You have 2 carotid arteries, one on each side of your neck        Call 911 for any of the following:   · You have any of the following signs of a stroke:      ? Numbness or drooping on one side of your face     ? Weakness in an arm or leg    ? Confusion or difficulty speaking    ? Dizziness, a severe headache, or vision loss    · You have any of the following signs of a heart attack:      ? Squeezing, pressure, or pain in your chest    ? You may  also have any of the following:     § Discomfort or pain in your back, neck, jaw, stomach, or arm    § Shortness of breath    § Nausea or vomiting    § Lightheadedness or a sudden cold sweat    Contact your healthcare provider if:   · You have questions or concerns about your condition or care  Signs and symptoms of carotid artery disease: You may have no signs or symptoms  Most commonly, carotid artery disease causes transient ischemic attacks (TIAs), or mini-strokes  You may have numbness, weakness, lack of movement, or vision or speech problems  A TIA goes away quickly and does not cause permanent damage  A TIA may be a warning sign that you are about to have a stroke  If you have any symptoms of a TIA or stroke, seek care immediately  Warning signs of a stroke: The word F A S T  can help you remember and recognize warning signs of a stroke  · F = Face:  One side of the face droops  · A = Arms:  One arm starts to drop when both arms are raised  · S = Speech:  Speech is slurred or sounds different than usual     · T = Time:  A person who is having a stroke needs to be seen immediately  A stroke is a medical emergency that needs immediate treatment  Some medicines and treatments work best if given within a few hours of a stroke  Treatment  for carotid artery disease depends on how narrow your arteries have become, your symptoms, and your general health  The goal of treatment is to lower your risk for a stroke  You may need any of the following:  · Take aspirin if directed    Your healthcare provider may suggest that you take an aspirin a day to prevent blood clots from forming in the carotid arteries  If your healthcare provider wants you to take aspirin daily, do not take acetaminophen or ibuprofen instead  · Control risk factors  High blood pressure, high cholesterol, heart disease, diabetes, and being overweight increase your risk for atherosclerosis  · Procedures can help open blocked arteries  A carotid endarterectomy is used to cut plaque out of the artery  An angioplasty is used to push the plaque against the artery wall with a balloon device  Sometimes a stent is placed during an angioplasty  A stent is a metal mesh tube that is placed in the artery to keep it open  Manage carotid artery disease:   · Eat a variety of healthy foods  Healthy foods include fruit, vegetables, whole-grain breads, low-fat dairy products, lean meat, and fish  Choose fish that are high in omega-3 fatty acids, such as salmon and fresh tuna  Ask your healthcare provider for more information on a heart healthy diet and the DASH eating plan  · Limit sodium (salt)  Sodium may increase your blood pressure  Add less table salt to your foods  Read food labels and choose foods that are low in sodium  Your healthcare provider may suggest you follow a low sodium diet  · Reach or maintain a healthy weight  Extra weight makes your heart work harder  Ask your healthcare provider how much you should weight  He can help you create a safe weight loss plan  Even a weight loss of 10% of your body weight can help your heart function better  · Exercise as directed  Exercise helps improve heart function and can help you manage your weight  Exercise can also help lower your cholesterol and blood sugar levels  Try to get at least 30 minutes of exercise at least 5 times each week  Try to be active every day  Your healthcare provider can help you create an exercise plan that works best for you  · Limit alcohol    Alcohol can increase your blood pressure and triglyceride levels  Men should limit alcohol to 2 drinks per day  Women should limit alcohol to 1 drink per day  A drink of alcohol is 12 ounces of beer, 5 ounces of wine, or 1½ ounces of liquor  · Do not smoke  Nicotine and other chemicals in cigarettes and cigars can cause heart and lung damage  Ask your healthcare provider for information if you currently smoke and need help to quit  E-cigarettes or smokeless tobacco still contain nicotine  Talk to your healthcare provider before you use these products  Follow up with your healthcare provider as directed:  Write down your questions so you remember to ask them during your visits  © Copyright [address] Information is for End User's use only and may not be sold, redistributed or otherwise used for commercial purposes  All illustrations and images included in CareNotes® are the copyrighted property of A D A M , Inc  or [place] [facility]   The above information is an  only  It is not intended as medical advice for individual conditions or treatments  Talk to your doctor, nurse or pharmacist before following any medical regimen to see if it is safe and effective for you  Carotid Endarterectomy   WHAT YOU NEED TO KNOW:   Carotid endarterectomy (CEA) is a surgery done to remove plaque (fatty deposits) from inside your carotid artery  The carotid artery is a blood vessel found in both sides of your neck  Plaque may build up inside your carotid artery and decrease blood flow to your brain  A piece of plaque may also break free and cause a stroke  HOW TO PREPARE:   Before your surgery:   · Ask your healthcare provider if you need to take aspirin or a blood-thinning medicine before your surgery  This medicine will help stop clots from forming in your blood  · Bring your medicine bottles or a list of your medicines when you see your healthcare provider  Tell your provider if you are allergic to any medicine   Tell your provider if you use any herbs, food supplements, or over-the-counter medicine  · Tell your healthcare provider if you have any other medical conditions, such as diabetes or heart, lung, or kidney disease  · Your healthcare provider may have you do certain tests to check your brain function  You may be asked to draw, memorize and remember items, or do certain movements  · You may need to have blood tests done before your surgery  Imaging tests, such as an ultrasound, a CT scan, or MRI, may also be done  Tell the healthcare provider if you have ever had an allergic reaction to contrast dye  Do not enter the MRI room with anything metal  Metal can cause serious injury  Tell the healthcare provider if you have any metal in or on your body  The imaging tests may be done to check the plaques in your carotid artery  You may also need an angiography to check the blood flow from your carotid artery to your brain  Write down the date, time, and location of each test     · Write down the correct date, time, and location of your procedure  · You or a close family member will be asked to sign a legal document called a consent form  It gives healthcare providers permission to do the procedure or surgery  It also explains the problems that may happen, and your choices  Make sure all your questions are answered before you sign this form  The night before your surgery:   · Ask your healthcare provider about directions for eating and drinking  The day of your surgery:   · Healthcare providers may insert an intravenous tube (IV) into your vein  A vein in the arm is usually chosen  Through the IV tube, you may be given liquids and medicine  · An anesthesiologist will talk to you before your surgery  You may need medicine to keep you asleep or numb an area of your body during surgery   Tell healthcare providers if you or anyone in your family has had a problem with anesthesia in the past     WHAT WILL HAPPEN: What will happen:   · You will be given anesthesia to keep you free from pain during your surgery  Your blood pressure, temperature, and heart rate will be monitored during surgery  Your skin over your carotid artery will be cleaned  An incision is made to open your neck and reach your carotid artery  Your carotid artery may be clamped in certain areas to stop your blood from flowing through it  Your carotid artery will then be cut open and the plaque will be removed  · Once the plaque is removed, your artery will be closed or put back together with stitches  A man-made patch or a piece of your own vein may also be used to close your artery  The clamps will be removed and the blood flow through your carotid artery will be checked  A drain may be put under your skin to remove extra blood and fluid from around your artery  Your skin will be closed with stitches and covered with a bandage  The bandage will help keep your surgery area clean and dry to prevent infection  After your surgery: You may be taken to a recovery room or your hospital room where healthcare providers will check you often  You may be attached to monitors that your healthcare providers can watch outside your room  Healthcare providers may remove your bandage soon after your surgery to check your incision  Your head will be kept straight for 24 hours after surgery to reduce side effects, such as swelling and fainting The head of your bed may also be slightly raised  Do not get out of bed until your healthcare provider says it is okay  CONTACT YOUR HEALTHCARE PROVIDER IF:   · You have a fever, vomiting, diarrhea, or other signs of illness      SEEK CARE IMMEDIATELY IF:   You have any of the following signs of a stroke:   · Numbness or drooping on one side of your face     · Weakness in an arm or leg    · Confusion or difficulty speaking    · Dizziness, a severe headache, or vision loss  RISKS:   · During your CEA, muscles or nerves near your carotid artery may be damaged  Nerve damage may lead to changes in how you look, or trouble chewing and swallowing  You may bleed more than expected or get an infection  You may have headaches, increased blood pressure, and abnormal heartbeats  Swelling of your surgery site may occur, which could block your airway  A fistula (abnormal connection) may form, which connects your carotid artery to another tissue  You are also at risk of having a heart attack during or after surgery, and this can be life-threatening  · You may get a blood clot in your leg, arm, or carotid artery  The clot may travel to your heart or brain and cause life-threatening problems, such as a heart attack or stroke  After a CEA, your carotid artery may become narrow or full of plaque again  The patch used for your surgery may tear or loosen, causing a large amount of blood loss  People who smoke, have diabetes, or kidney, lung, or heart disease are at a higher risk of problems  If you do not have the surgery, you are at risk for a stroke, which may be life-threatening  CARE AGREEMENT:   You have the right to help plan your care  Learn about your health condition and how it may be treated  Discuss treatment options with your healthcare providers to decide what care you want to receive  You always have the right to refuse treatment  © Copyright [employer] 2018 Information is for End User's use only and may not be sold, redistributed or otherwise used for commercial purposes  All illustrations and images included in CareNotes® are the copyrighted property of A D A M , Inc  or [employer]  The above information is an  only  It is not intended as medical advice for individual conditions or treatments  Talk to your doctor, nurse or pharmacist before following any medical regimen to see if it is safe and effective for you

## 2021-07-16 NOTE — TELEPHONE ENCOUNTER
Attempted to call patient on home x2 and cell and eventually LMOM    Recent CTA neck which was discussed at last appointment was officially read by radiology with findings of increased size of his known apical lung mass  Left message on machine alerting patient to these findings    Have had extensive conversation with patient in past regarding this mass and prior growth seen as compared to older scans and he continues to prefer treatment with vitamin C infusions rather than the surgical/medial care recommended by his oncologist at [facility]

## 2021-07-16 NOTE — TELEPHONE ENCOUNTER
Received phone call from Vicky from [facility] who stated there are immediate findings on patient's CTA of neck  Will send to triage provider to review for further recommendations

## 2021-08-05 NOTE — TELEPHONE ENCOUNTER
Patient has a history of gross hematuria/cystitis since 2019 which he was treated with Dr Kelsey Hartman  In April he saw Dr Perla Martini emergently for clot evacuation  Patient also does C!C before bed  He had a follow on 6/15 for gross hematuria  He has a history of low grade superficial bladder cancer  He is on blood thinners for peripheral vascular disease  He is suppose to schedule  in December for a followup Cysto  PCP says his blood count is low and would like him to be seen sooner that December    Please advise

## 2021-08-05 NOTE — TELEPHONE ENCOUNTER
Attempted to call home phone and mobile phone - Aminah Johnson were full Communication consent states can only call him    Patient is tentative scheduled for 8/24 @ 3:00 will need H & H and urine studies prior    He is also scheduled for a Cysto with Dr Lulú Ceballos on 9/30 @ 8:30 for gross hematuira

## 2021-08-05 NOTE — TELEPHONE ENCOUNTER
Patient of Dr Bridger Goldberg seen in the [facility] office  Patient pcp called and advised that he is still having blood in his urine  PCP office advised that he blood count is low and they would like him to be seen sooner than the 6 months  Please advise

## 2021-08-05 NOTE — TELEPHONE ENCOUNTER
Urine culture ordered as well as Hemoglobin/Hematocrit  Schedule with next available AP in office and ASAP cystoscopy with FT  Please review ER precautions and encourage hydration with water   Thank you

## 2021-08-06 NOTE — PROCEDURES
Video Swallow Study      Patient Name: Ronald Carrasquillo  HDMKA'O Date: 8/6/2021        Past Medical History  Past Medical History:   Diagnosis Date    [facility]     Atrial fibrillation [facility]     Cancer ([facility] ) 2014    lung ca- right lung  stage 3     [facility]     Ear problems     Hyperlipidemia     Hypertension     PAD (peripheral artery disease) [facility]     Pneumonia     PVD (peripheral vascular disease) [facility]         Past Surgical History  Past Surgical History:   Procedure Laterality Date    BOWEL RESECTION      COLONOSCOPY      HERNIA REPAIR      X2    IR AORTAGRAM WITH RUN-OFF  5/29/2020    SEVERAL    IR AORTAGRAM WITH RUN-OFF  10/9/2020    IR LOWER EXTREMITY ANGIOGRAM  11/4/2020    IR LOWER EXTREMITY ANGIOGRAM  5/27/2021    [place] CYSTOURETHROSCOPY W/IRRIG & EVAC CLOTS N/A 4/16/2021    Procedure: CYSTOSCOPY EVACUATION OF Victor M Fabry;  Surgeon: Rosaura Zuniga MD;  [facility];  Service: Urology    [place] CYSTOURETHROSCOPY,FULGUR 2-5 CM LESN N/A 1/15/2021    Procedure: CYSTOSCOPY, TRANSURETHRAL RESECTION OF BLADDER TUMOR (TURBT);   Surgeon: Ilene Blankenship MD;  Location: [address] [place] [address];  Service: Urology    [facility] Left 10/9/2020    Procedure: ARTERIOGRAM Left lower extremity agram right femoral access with SFA stenting/angioplasty;  Surgeon: Suad Simmons MD;  [facility];  Service: Vascular    [place] [facility] Juanitain 3RD+ ORD SLCTV ABDL PEL[facility] Right 11/4/2020    Procedure: ARTERIOGRAM RLE angiogram L fem access;  Surgeon: Gerson Pineda MD;  [facility];  Service: Vascular    [place] TRANSURETHRAL ELEC-SURG PROSTATECTOM N/A 3/2/2021    Procedure: CYSTOSCOPY, TRANSURETHRAL RESECTION OF Lo Reeks;  Surgeon: Ilene Blankenship MD;  Location: [address] [place] [address];  Service: Urology    PROSTATE SURGERY      REMOVAL VENOUS PORT (PORT-A-CATH)           General Information:    81 yo gentleman referred to [facility]  for a VBS by LIONEL Fischer  for dysphagia w/  c/o food sometimes "gets stuck"  And he experiences choking episodes  Pt stated it seldom occurs and has improved since stopping a certain medication  Cognition:  WNL    Speech/Swallow Mec: Oral motor movements appeared  WNL; Dentition was  natural;  Respiratory Status: WNL on RA;   Current diet: regular diet w/ thin liquids  Prior VBS none    Pt was seen in radiology for a Video Barium Swallow Study, pt stood and was viewed laterally with the following consistencies: puree, soft/ solid, hard solid, HTL, NTL, thin liquidsm barium pill w/ water by cup      Results are as follows:     **Images are available for review on PACS          Oral Stage: WNL   pt exhibited adequate bolus retrieval; good mastication, manipulation and transfer of all food consistencies  Good oral control and transfer w/ liquids  No oral residue noted  Pharyngeal Stage: Min-mildly impaired     Swallow initiation was mildly delayed with spill over epiglottis to pyriform sinuses prior to swallow  Transient penetration was noted with nectar thick liquids by cup and thin liquids by cup more than straw  No pharyngeal retention and no aspiration observed  Epiglottic inversion and airway closure were adequate  Esophageal Stage:    Pt had barium swallow study prior to VBS, see formal report for details  "Corkscrew" esophagus was noted distally w/ pill retention which eventually cleared with several sips of liquids and tsps of puree  Assessment Summary:   Min-mild pharyngeal dysphagia characterized by delayed swallow initiation and transient penetration with nectar thick and thin liquids, but no aspiration observed  No pharyngeal retention noted       Diagnosis/Prognosis:            Recommendations:   Cont Regular diet w/ thin liquids  meds as tolerated    April Marshal Whipple MA CCC-SLP  Speech Pathologist  [place] license # [id number] [facility] [id number]  [address] [place] [address] license # [id number]  Available via [employer]

## 2021-08-09 NOTE — TELEPHONE ENCOUNTER
Francisco Javier Burton's PCP called stating he had a urine sample sent out and patient is bleeding from his penis  urine culture showed 100,000 gram negative bacilli - pseudomonas aeruginosa- and can only be treated with IV antibiotlics  He is aware that he has a appoint on 8/24 but he needs to be seen sooner  I found him an appointment on Wednesday with Haley Bazzi is from [place] so Im assuming that is why he can't order Iv antibiotic   Johann Velázquez - on call provider to discuss with him

## 2021-08-09 NOTE — TELEPHONE ENCOUNTER
Attempted to call patient several times on his listed contact numbers, unable to leave voicemail  Reviewed results of his recent urine culture from 8/5/2021 which identified susceptibility to cephalosporins  Patient may receive oral cephalosporin  He is scheduled to follow up in our office on 8/11/2021    Will continue to try contacting patient, as well as notifying PCP in AM

## 2021-08-10 NOTE — TELEPHONE ENCOUNTER
Finally got [employer] and explained that Dr Bryanna Doss wanted him to come to our office to sarmad to discuss his bleeding and to get aBX  Luiza Loo Reviewed numerous times that it is the urology office on [address]

## 2021-08-10 NOTE — TELEPHONE ENCOUNTER
Called Francisco Javier to ask about his bleeding with urination as notified by PCP  We woul like him to call back and discuss  Also can place him on abn antibiotic Kelfex    He has follow up in our office on 8/11 @ 10:00

## 2021-08-10 NOTE — TELEPHONE ENCOUNTER
Attempted to call Dr Jordon Landa back to notify up of update and phone just rang - no picked up and no voice mail

## 2021-08-11 NOTE — PATIENT INSTRUCTIONS
Suprax to be taken as directed  Repeat Urine testing 1 week after completion of antibiotics  Call the office for concerns or questions  Continue to keep yourself hydrated with at least 40 oz pf water intake per day

## 2021-08-11 NOTE — PROGRESS NOTES
8/11/2021    No chief complaint on file  Assessment and Plan    80 y o  male managed by Dr Arleth Maciel    1  Urinary Tract Infection with gross hematuria  ·  Hematuria exacerbated by the use of Xarelto    · Urine culture performed 08/05/2021 positive for Pseudomonas  ·  prescription for Suprax provided  ·  patient will repeat urine testing 1 week after completion of antibiotic therapy  ·  maintain adequate hydration upwards to 40 oz of water intake per day  ·  we discussed symptoms at which patient should call the office for before prior office visit should he experience them  ·  follow up in the office at next scheduled office visit 08/25/2021    2  Benign prostatic hyperplasia  ·  status post TURP with Dr Eun Hill  ·  continue finasteride as previously prescribed    3  Bladder cancer  ·  status post TURBT, 01/2021 consistent with low-grade superficial bladder cancer  ·  urine for cytology ordered  ·  cystoscopy ordered      History of Present Illness  Lee Aggarwal is a 80 y o  male here for follow up evaluation of   Urinary tract infection  Patient previously had a urine culture performed 08/05/2021 which was positive for Pseudomonas  This was not treated  He presented to the office with a complaint of gross blood in his urine  He reports resolution of hematuria today  He currently denies all lower urinary tract symptoms  He denies fever and chills  Patient does have a new diagnosis of low-grade superficial bladder cancer diagnosed on TURBT 01/2021  Review of Systems   Constitutional: Negative for chills and fever  Respiratory: Negative for cough and shortness of breath  Cardiovascular: Negative for chest pain  Gastrointestinal: Negative for abdominal distention, abdominal pain, blood in stool, nausea and vomiting  Genitourinary: Positive for hematuria  Negative for difficulty urinating, dysuria, enuresis, flank pain, frequency and urgency  Skin: Negative for rash       Past Medical History  Past Medical History:   Diagnosis Date    [facility]     Atrial fibrillation [facility]     Cancer ([facility] ) 2014    lung ca- right lung  stage 3     Carotid artery disease ([facility]     Ear problems     Hyperlipidemia     Hypertension     PAD (peripheral artery disease) (HCC)     Pneumonia     PVD (peripheral vascular disease) ([facility] )        Past Social History  Past Surgical History:   Procedure Laterality Date    BOWEL RESECTION      COLONOSCOPY      HERNIA REPAIR      X2    IR AORTAGRAM WITH RUN-OFF  5/29/2020    SEVERAL    IR AORTAGRAM WITH RUN-OFF  10/9/2020    IR LOWER EXTREMITY ANGIOGRAM  11/4/2020    IR LOWER EXTREMITY ANGIOGRAM  5/27/2021    [place] CYSTOURETHROSCOPY W/IRRIG & EVAC CLOTS N/A 4/16/2021    Procedure: CYSTOSCOPY EVACUATION OF Charlesetta Cellar;  Surgeon: Jenna Ga MD;  [facility];  Service: Urology    WI CYSTOURETHROSCOPY,FULGUR 2-5 CM LESN N/A 1/15/2021    Procedure: CYSTOSCOPY, TRANSURETHRAL RESECTION OF BLADDER TUMOR (TURBT);   Surgeon: Emiliano Castaneda MD;  Location: [address] [place] [address];  Service: Urology    WI [employer] 3RD+ ORD SLCTV ABDL PEL/[facility] Left 10/9/2020    Procedure: ARTERIOGRAM Left lower extremity agram right femoral access with SFA stenting/angioplasty;  Surgeon: Bernard Simmons MD;  [facility];  Service: Vascular    [place] [employer] 3RD+ ORD SLCTV ABDL PEL/[facility] Right 11/4/2020    Procedure: ARTERIOGRAM RLE angiogram L fem access;  Surgeon: Maico Buckner MD;  [facility];  Service: Vascular    WI TRANSURETHRAL ELEC-SURG PROSTATECTOM N/A 3/2/2021    Procedure: Kadie Coles;  Surgeon: Emiliano Castaneda MD;  Location: [address] [place] [address];  Service: Urology    PROSTATE SURGERY      REMOVAL VENOUS PORT (PORT-A-CATH)       Social History     Tobacco Use   Smoking Status Former Smoker    Years: 30 00    Types: Cigarettes    Quit date: 1/8/1988    Years since quittin 6   Smokeless Tobacco Never Used       Past Family History  Family History   Problem Relation Age of Onset    Alzheimer's disease Mother     Cancer Father        Past Social history  Social History     Socioeconomic History    Marital status: Single     Spouse name: Not on file    Number of children: Not on file    Years of education: Not on file    Highest education level: Not on file   Occupational History    Not on file   Tobacco Use    Smoking status: Former Smoker     Years: 30      Types: Cigarettes     Quit date: 1988     Years since quittin 6    Smokeless tobacco: Never Used   Vaping Use    Vaping Use: Never used   Substance and Sexual Activity    Alcohol use: Yes     Comment: SOCIAL    Drug use: Never    Sexual activity: Never   Other Topics Concern    Not on file   Social History Narrative    Not on file     Social Determinants of Health     Financial Resource Strain:     Difficulty of Paying Living Expenses:    Food Insecurity:     Worried About Running Out of Food in the Last Year:     [address] in the Last Year:    Transportation Needs:     Lack of Transportation (Medical):      Lack of Transportation (Non-Medical):    Physical Activity:     Days of Exercise per Week:     Minutes of Exercise per Session:    Stress:     Feeling of Stress :    Social Connections:     Frequency of Communication with Friends and Family:     Frequency of Social Gatherings with Friends and Family:     Attends Hinduism Services:     Active Member of Clubs or Organizations:     Attends Club or Organization Meetings:     Marital Status:    Intimate Partner Violence:     Fear of Current or Ex-Partner:     Emotionally Abused:     Physically Abused:     Sexually Abused:        Current Medications  Current Outpatient Medications   Medication Sig Dispense Refill    aspirin (ECOTRIN LOW STRENGTH) 81 mg EC tablet Take 1 tablet (81 mg total) by mouth daily      atorvastatin (LIPITOR) 20 mg tablet Take 1 tablet (20 mg total) by mouth daily 90 tablet 4    finasteride (PROSCAR) 5 mg tablet Take 5 mg by mouth daily      hydrochlorothiazide (HYDRODIURIL) 25 mg tablet Take 25 mg by mouth daily      ipratropium (ATROVENT) 0 03 % nasal spray 1 or 2 sprays each nostril 3 times per day, as needed  30 mL 3    metoprolol succinate (TOPROL-XL) 25 mg 24 hr tablet Take 25 mg by mouth daily      metoprolol tartrate (LOPRESSOR) 25 mg tablet Take 25 mg by mouth daily       rivaroxaban (Xarelto) 10 mg tablet Take 10 mg by mouth daily at bedtime To check with surgeon when to hold RX  Pt advised to stop after 2/26/21      belladonna-opium (B&O SUPPOSITORY) 16 2-30 mg suppository Insert 1 suppository into the rectum every 8 (eight) hours as needed for bladder spasmsMax Daily Amount: 3 suppositories (Patient not taking: Reported on 8/11/2021) 10 suppository 0    cefixime (Suprax) 400 mg Take 1 capsule (400 mg total) by mouth daily for 5 days 5 capsule 0     No current facility-administered medications for this visit  Allergies  Allergies   Allergen Reactions    Sulfa Antibiotics Shortness Of Breath     Other reaction(s): Other (See Comments)  SOB      Protamine      Other reaction(s): Other (See Comments)  Did not work for patient           The following portions of the patient's history were reviewed and updated as appropriate: allergies, current medications, past medical history, past social history, past surgical history and problem list       Vitals  Vitals:    08/11/21 1003   BP: 138/70   Weight: 71 2 kg (157 lb)   Height: 5' 7" (1 702 m)           Physical Exam  Physical Exam  Vitals reviewed  Constitutional:       General: He is not in acute distress  Appearance: Normal appearance  He is normal weight  HENT:      Head: Normocephalic  Pulmonary:      Effort: No respiratory distress  Breath sounds: Normal breath sounds     Skin:     General: Skin is warm and dry    Neurological:      General: No focal deficit present  Mental Status: He is alert and oriented to person, place, and time  Psychiatric:         Mood and Affect: Mood normal          Behavior: Behavior normal        Results  No results found for this or any previous visit (from the past 1 hour(s))  ]  No results found for: PSA  Lab Results   Component Value Date    GLUCOSE 98 04/16/2021    CALCIUM 8 3 04/18/2021    K 4 0 04/18/2021    CO2 27 04/18/2021     04/18/2021    BUN 12 04/18/2021    CREATININE 0 72 04/18/2021     Lab Results   Component Value Date    WBC 6 81 04/17/2021    HGB 8 8 (L) 04/18/2021    HCT 26 7 (L) 04/18/2021    MCV 95 04/17/2021     (L) 04/17/2021           Orders  Orders Placed This Encounter   Procedures    Cystoscopy     History of low-grade superficial bladder cancer -diagnosed 01/2021     Standing Status:   Future     Standing Expiration Date:   8/11/2022    Urine culture     Standing Status:   Future     Standing Expiration Date:   8/11/2022     Order Specific Question:   [facility]     Answer:   Immediate    Urinalysis with microscopic     Standing Status:   Future     Standing Expiration Date:   8/11/2022       YELITZA Leary

## 2021-09-17 PROBLEM — C79.31 BRAIN METASTASES (HCC): Status: ACTIVE | Noted: 2021-01-01

## 2021-09-17 PROBLEM — E87.3 METABOLIC ALKALOSIS: Status: ACTIVE | Noted: 2021-01-01

## 2021-09-17 PROBLEM — R60.1 ANASARCA: Status: ACTIVE | Noted: 2021-01-01

## 2021-09-17 PROBLEM — E83.39 HYPOPHOSPHATEMIA: Status: ACTIVE | Noted: 2021-01-01

## 2021-09-17 NOTE — ASSESSMENT & PLAN NOTE
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

## 2021-09-17 NOTE — H&P
[facility]  H&P- Patricia Nash 1934, 80 y o  male MRN: [id number]  Unit/Bed#: QCE Encounter: [id number]  Primary Care Provider: Paula Caceres MD   Date and time admitted to hospital: 9/17/2021  3:13 PM    * Hypokalemia  Assessment & Plan  Likely due to poor oral intake and HCTZ use  Stop HCTZ  Aggressive K supplementation IV and PO  Monitor BMP q4h  Regular diet  Reports that he is feeling better after the first dose of potassium    Hypophosphatemia  Assessment & Plan  As above    Anasarca  Assessment & Plan  · Due to hydrocortisone use for brain metastases  · Compression stocking  · Ace wraps to arms  · Avoid diuretic use due to severe electrolyte abnormality at this time    Brain metastases [facility])  Assessment & Plan  · Developed trouble with his gait last month and went to [facility], 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  Due to his poor prognosis he has refused radiation/chemo etc   · Currently on keppra 500mg q12h, will continue  · Was prescribed hydrocortisone 100mg TID  · He reports he is better in regards to his gait abnormalities and would like to wean off hydrocortisone, will decrease to 50mg q8h, can be further tapered outpatient  Malignant neoplasm of upper lobe of right lung   Assessment & Plan  Does not want any oncology treatments for this    Atrial fibrillation  Assessment & Plan  · Currently off his AC and rate control medications and wants to remain off    Metabolic alkalosis  Assessment & Plan  Likely due to diuretic use and hypochloremia inhibiting bicarbonate excretion  KCl supplementation as above  Monitor respiratory status and BMP      VTE Pharmacologic Prophylaxis:   High Risk (Score >/= 5) - Pharmacological DVT Prophylaxis Contraindicated  Sequential Compression Devices Ordered  Code Status: Prior DNR DNI  Discussion with family: Updated  (daughter) at bedside      Anticipated Length of Stay: Patient will be admitted on an inpatient basis with an anticipated length of stay of greater than 2 midnights secondary to severe electrolyte abnormalities  Total Time for Visit, including Counseling / Coordination of Care: 45 minutes Greater than 50% of this total time spent on direct patient counseling and coordination of care  Chief Complaint: abnormal blood work    History of Present Illness:  Samuel Cates is a 80 y o  male with a PMH of metastatic lung cancer who presents with abnormal blood work  He was found to have a severely low potassium of 1 5  This was confirmed in the ED  He has had poor oral intake and has been using hydrochlorothiazide for peripheral edema  He notes generalized weakness and lack of taste but this has improved since his first dose of potassium  Since his diagnosis of brain metastases he has been treating his cancer naturally with diet, supplements  He prefers to avoid aggressive therapies and does not want any imaging done this hospitalization  Review of Systems:  Review of Systems   All other systems reviewed and are negative        Past Medical and Surgical History:   Past Medical History:   Diagnosis Date    [facility] [address] [facility]     Atrial fibrillation [facility]     Cancer ([facility] [address] ) 2014    lung ca- right lung  stage 3     Carotid artery disease ([facility] [address] )     Ear problems     Hyperlipidemia     Hypertension     PAD (peripheral artery disease) (HCC)     Pneumonia     PVD (peripheral vascular disease) ([facility] )        Past Surgical History:   Procedure Laterality Date    BOWEL RESECTION      COLONOSCOPY      HERNIA REPAIR      X2    IR AORTAGRAM WITH RUN-OFF  5/29/2020    SEVERAL    IR AORTAGRAM WITH RUN-OFF  10/9/2020    IR LOWER EXTREMITY ANGIOGRAM  11/4/2020    IR LOWER EXTREMITY ANGIOGRAM  5/27/2021    [place] CYSTOURETHROSCOPY W/IRRIG & EVAC CLOTS N/A 4/16/2021    Procedure: CYSTOSCOPY EVACUATION OF Arlana Cardington;  Surgeon: Neeraj Wilson MD;  [facility];  Service: Urology  HI CYSTOURETHROSCOPY,FULGUR 2-5 CM LESN N/A 1/15/2021    Procedure: CYSTOSCOPY, TRANSURETHRAL RESECTION OF BLADDER TUMOR (TURBT); Surgeon: Ebony Snyder MD;  Location: [address] [place] [address];  Service: Urology    HI [facility] 3RD+ ORD [facility]DL [facility] 10/9/2020    Procedure: ARTERIOGRAM Left lower extremity agram right femoral access with SFA stenting/angioplasty;  Surgeon: Franklyn Simmons MD;  [facility];  Service: Vascular    HI [facility] 3RD+ ORD SLCTV ABDL PEL/[facility] Right 11/4/2020    Procedure: ARTERIOGRAM RLE angiogram L fem access;  Surgeon: Valentine Gilliland MD;  [facility];  Service: Vascular    HI TRANSURETHRAL ELEC-SURG PROSTATECTOM N/A 3/2/2021    Procedure: CYSTOSCOPY, TRANSURETHRAL RESECTION OF Elsworth Craze;  Surgeon: Ebony Snyder MD;  Location: [address] [place] [address];  Service: Urology    PROSTATE SURGERY      REMOVAL VENOUS PORT (PORT-A-CATH)         Meds/Allergies:  Prior to Admission medications    Medication Sig Start Date End Date Taking?  Authorizing Provider   aspirin (ECOTRIN LOW STRENGTH) 81 mg EC tablet Take 1 tablet (81 mg total) by mouth daily 6/10/21  Yes Leticia Simmons MD   finasteride (PROSCAR) 5 mg tablet Take 5 mg by mouth daily   Yes Historical Provider, MD   hydrochlorothiazide (HYDRODIURIL) 25 mg tablet Take 25 mg by mouth daily   Yes Historical Provider, MD   hydrocortisone (CORTEF) 10 mg tablet Take 100 mg by mouth 3 (three) times a day   Yes Historical Provider, MD   levETIRAcetam (KEPPRA) 500 mg tablet Take 500 mg by mouth every 12 (twelve) hours   Yes Historical Provider, MD   atorvastatin (LIPITOR) 20 mg tablet Take 1 tablet (20 mg total) by mouth daily  Patient not taking: Reported on 9/17/2021 6/10/21   Leticia Simmons MD   belladonna-opium (B&O SUPPOSITORY) 16 2-30 mg suppository Insert 1 suppository into the rectum every 8 (eight) hours as needed for bladder spasmsMax Daily Amount: 3 suppositories  Patient not taking: Reported on 2021   Urmila Alberts MD   ipratropium (ATROVENT) 0 03 % nasal spray 1 or 2 sprays each nostril 3 times per day, as needed  Patient not taking: Reported on 2021   James Evans PA-C   metoprolol succinate (TOPROL-XL) 25 mg 24 hr tablet Take 25 mg by mouth daily  Patient not taking: Reported on 2021   Historical Provider, MD   metoprolol tartrate (LOPRESSOR) 25 mg tablet Take 25 mg by mouth daily   Patient not taking: Reported on 2021    Historical Provider, MD   rivaroxaban (Xarelto) 10 mg tablet Take 10 mg by mouth daily at bedtime To check with surgeon when to hold RX  Pt advised to stop after 21  Patient not taking: Reported on 2021    Historical Provider, MD     I have reviewed home medications with patient family member  Allergies: Allergies   Allergen Reactions    Sulfa Antibiotics Shortness Of Breath     Other reaction(s): Other (See Comments)  SOB      Protamine      Other reaction(s):  Other (See Comments)  Did not work for patient         Social History:  Marital Status: Single   Occupation: retired  Patient Pre-hospital Living Situation: Home  Patient Pre-hospital Level of Mobility: walks  Patient Pre-hospital Diet Restrictions: None  Substance Use History:   Social History     Substance and Sexual Activity   Alcohol Use Yes    Comment: SOCIAL     Social History     Tobacco Use   Smoking Status Former Smoker    Years: 30 00    Types: Cigarettes    Quit date: 1988    Years since quittin 7   Smokeless Tobacco Never Used     Social History     Substance and Sexual Activity   Drug Use Never       Family History:  Family History   Problem Relation Age of Onset    Alzheimer's disease Mother     Cancer Father        Physical Exam:     Vitals:   Blood Pressure: 101/57 (21 1724)  Pulse: 69 (21 1724)  Temperature: 98 7 °F (37 1 °C) (21 1507)  Temp Source: Oral (21 1507)  Respirations: 17 (21 1724)  SpO2: 95 % (09/17/21 1610)    Physical Exam  Constitutional:       General: He is not in acute distress  Appearance: Normal appearance  He is not toxic-appearing  HENT:      Head: Normocephalic and atraumatic  Nose: Nose normal       Mouth/Throat:      Mouth: Mucous membranes are moist       Pharynx: Oropharynx is clear  Eyes:      Extraocular Movements: Extraocular movements intact  Cardiovascular:      Rate and Rhythm: Normal rate  Rhythm irregular  Pulmonary:      Effort: Pulmonary effort is normal       Breath sounds: No wheezing or rales  Abdominal:      General: There is no distension  Palpations: Abdomen is soft  Tenderness: There is no abdominal tenderness  Musculoskeletal:      Cervical back: Normal range of motion  Right lower leg: Edema present  Left lower leg: Edema present  Neurological:      General: No focal deficit present  Mental Status: He is alert and oriented to person, place, and time  Psychiatric:         Mood and Affect: Mood normal          Behavior: Behavior normal         Additional Data:     Lab Results:  Results from last 7 days   Lab Units 09/17/21  1548   WBC Thousand/uL 8 30   HEMOGLOBIN g/dL 13 3   HEMATOCRIT % 38 0   PLATELETS Thousands/uL 90*   NEUTROS PCT % 85*   LYMPHS PCT % 8*   MONOS PCT % 6   EOS PCT % 0     Results from last 7 days   Lab Units 09/17/21  1548   SODIUM mmol/L 137   POTASSIUM mmol/L 1 5*   CHLORIDE mmol/L 81*   CO2 mmol/L >45*   BUN mg/dL 21   CREATININE mg/dL 0 81   CALCIUM mg/dL 8 3   ALBUMIN g/dL 2 5*   TOTAL BILIRUBIN mg/dL 2 04*   ALK PHOS U/L 89   ALT U/L 103*   AST U/L 77*   GLUCOSE RANDOM mg/dL 132                       Imaging: Reviewed radiology reports from this admission including: MRI brain  No orders to display       EKG and Other Studies Reviewed on Admission:   · EKG: NSR  HR 85     ** Please Note: This note has been constructed using a voice recognition system   **

## 2021-09-17 NOTE — ASSESSMENT & PLAN NOTE
· Due to hydrocortisone use for brain metastases  · Compression stocking  · Ace wraps to arms  · Avoid diuretic use due to severe electrolyte abnormality at this time

## 2021-09-17 NOTE — ASSESSMENT & PLAN NOTE
Likely due to diuretic use and hypochloremia inhibiting bicarbonate excretion    KCl supplementation as above  Monitor respiratory status and BMP

## 2021-09-17 NOTE — ED PROVIDER NOTES
History  Chief Complaint   Patient presents with    Abnormal Lab     was seen at cardiologist and was told potassium was "1 6"     Patient is an 28-year-old male, past medical history including AFib on Xarelto, hyperlipidemia, hypertension, lung cancer with metastases, and bladder cancer status post TURP, who presents to the emergency department for abnormal potassium  Per patient, he has been experiencing worsening swelling of his extremities  Due to this, his cardiologist started him on a water pill (furosemide)  Patient states he began taking it 3 days ago  Two days ago, patient had labs to check his electrolytes  He was contacted today telling him that his potassium was extremely low and that he should come the emergency department for further evaluation  Currently, patient has no complaints at this time  Denies any chest pain, shortness of breath, nausea vomiting, diarrhea, headaches, dizziness, or any other new or concerning symptoms  Per chart review, patient is currently receiving IV vitamin C, magnesium and eating a ketogenic diet to treat his cancer  Patient has declined chemotherapy and radiation in the past     Outpatient labs were also reviewed  Patient's potassium was found to be 1 5  Patient's potassium less than 1 month ago was 4 2  Prior to Admission Medications   Prescriptions Last Dose Informant Patient Reported?  Taking?   aspirin (ECOTRIN LOW STRENGTH) 81 mg EC tablet 9/17/2021 at Unknown time Self No Yes   Sig: Take 1 tablet (81 mg total) by mouth daily   atorvastatin (LIPITOR) 20 mg tablet Not Taking at Unknown time Self No No   Sig: Take 1 tablet (20 mg total) by mouth daily   Patient not taking: Reported on 9/17/2021   belladonna-opium (B&O SUPPOSITORY) 16 2-30 mg suppository  Self No No   Sig: Insert 1 suppository into the rectum every 8 (eight) hours as needed for bladder spasmsMax Daily Amount: 3 suppositories   Patient not taking: Reported on 8/11/2021 finasteride (PROSCAR) 5 mg tablet 2021 at Unknown time Self Yes Yes   Sig: Take 5 mg by mouth daily   hydrochlorothiazide (HYDRODIURIL) 25 mg tablet 2021 at Unknown time Self Yes Yes   Sig: Take 25 mg by mouth daily   hydrocortisone (CORTEF) 10 mg tablet   Yes Yes   Sig: Take 100 mg by mouth 3 (three) times a day   ipratropium (ATROVENT) 0 03 % nasal spray Not Taking at Unknown time  No No   Si or 2 sprays each nostril 3 times per day, as needed     Patient not taking: Reported on 2021   levETIRAcetam (KEPPRA) 500 mg tablet   Yes Yes   Sig: Take 500 mg by mouth every 12 (twelve) hours   metoprolol succinate (TOPROL-XL) 25 mg 24 hr tablet Not Taking at Unknown time  Yes No   Sig: Take 25 mg by mouth daily   Patient not taking: Reported on 2021   metoprolol tartrate (LOPRESSOR) 25 mg tablet Not Taking at Unknown time Self Yes No   Sig: Take 25 mg by mouth daily    Patient not taking: Reported on 2021   rivaroxaban (Xarelto) 10 mg tablet Not Taking at Unknown time Self Yes No   Sig: Take 10 mg by mouth daily at bedtime To check with surgeon when to hold RX  Pt advised to stop after 21   Patient not taking: Reported on 2021      Facility-Administered Medications: None       Past Medical History:   Diagnosis Date    AAA [facility]abdominal aortic aneurysm) ([facility] )     Atrial fibrillation ([facility] )     Cancer ([facility] )     lung ca- right lung  stage 3     Carotid artery disease ([facility] )     Ear problems     Hyperlipidemia     Hypertension     [facility]     Pneumonia     PVD (peripheral vascular disease) ([facility] )        Past Surgical History:   Procedure Laterality Date    BOWEL RESECTION      COLONOSCOPY      HERNIA REPAIR      X2    IR AORTAGRAM WITH RUN-OFF  2020    SEVERAL    IR AORTAGRAM WITH RUN-OFF  10/9/2020    IR LOWER EXTREMITY ANGIOGRAM  2020    IR LOWER EXTREMITY ANGIOGRAM  2021    AR CYSTOURETHROSCOPY W/IRRIG & EVAC CLOTS N/A 2021    Procedure: CYSTOSCOPY EVACUATION OF CLOTS; Gaviota Pruett;  Surgeon: Jermaine Lara MD;  Location:  MAIN OR;  Service: Urology    WI CYSTOURETHROSCOPY,FULGUR 2-5 CM LESN N/A 1/15/2021    Procedure: CYSTOSCOPY, TRANSURETHRAL RESECTION OF BLADDER TUMOR (TURBT); Surgeon: Nicolas Mario MD;  Location: [address] [place] [address];  Service: Urology    WI Zan Parkinson 3RD+ ORD SLCTV ABDL PEL/[facility] Left 10/9/2020    Procedure: ARTERIOGRAM Left lower extremity agram right femoral access with SFA stenting/angioplasty;  Surgeon: Bjorn Simmons MD;  Location:  MAIN OR;  Service: Vascular    WI Zan Parkinson 3RD+ ORD SLCTV ABDL PEL/[facility] Right 2020    Procedure: ARTERIOGRAM RLE angiogram L fem access;  Surgeon: Vincenzo Mar MD;  Location:  MAIN OR;  Service: Vascular    WI TRANSURETHRAL ELEC-SURG PROSTATECTOM N/A 3/2/2021    Procedure: CYSTOSCOPY, TRANSURETHRAL RESECTION OF Randall Valentin;  Surgeon: Nicolas Mario MD;  Location: [place] [facility];  Service: Urology    PROSTATE SURGERY      REMOVAL VENOUS PORT (PORT-A-CATH)         Family History   Problem Relation Age of Onset    Alzheimer's disease Mother     Cancer Father      I have reviewed and agree with the history as documented  E-Cigarette/Vaping    E-Cigarette Use Never User      E-Cigarette/Vaping Substances    Nicotine No     THC No     CBD No     Flavoring No     Other No     Unknown No      Social History     Tobacco Use    Smoking status: Former Smoker     Years: 30 00     Types: Cigarettes     Quit date: 1988     Years since quittin 7    Smokeless tobacco: Never Used   Vaping Use    Vaping Use: Never used   Substance Use Topics    Alcohol use: Yes     Comment: SOCIAL    Drug use: Never        Review of Systems   Constitutional: Negative for chills and fever  Respiratory: Negative for cough and shortness of breath  Cardiovascular: Positive for leg swelling  Negative for chest pain     Gastrointestinal: Negative for abdominal pain, diarrhea, nausea and vomiting  Musculoskeletal: Negative for back pain  Neurological: Negative for dizziness, weakness and headaches  All other systems reviewed and are negative  Physical Exam  ED Triage Vitals [09/17/21 1507]   Temperature Pulse Respirations Blood Pressure SpO2   98 7 °F (37 1 °C) 66 18 116/60 98 %      Temp Source Heart Rate Source Patient Position - Orthostatic VS BP Location FiO2 (%)   Oral Monitor Lying Left arm --      Pain Score       --             Orthostatic Vital Signs  Vitals:    09/17/21 1507 09/17/21 1610 09/17/21 1724   BP: 116/60 112/56 101/57   Pulse: 66 63 69   Patient Position - Orthostatic VS: Lying Lying Lying       Physical Exam  Vitals and nursing note reviewed  Constitutional:       General: He is not in acute distress  Appearance: He is well-developed  He is not diaphoretic  HENT:      Head: Normocephalic and atraumatic  Right Ear: External ear normal       Left Ear: External ear normal       Nose: Nose normal    Eyes:      General: Lids are normal  No scleral icterus  Cardiovascular:      Rate and Rhythm: Normal rate and regular rhythm  Heart sounds: Normal heart sounds  No murmur heard  No friction rub  No gallop  Pulmonary:      Effort: Pulmonary effort is normal  No respiratory distress  Breath sounds: Normal breath sounds  No wheezing or rales  Chest:       Abdominal:      Palpations: Abdomen is soft  Tenderness: There is no abdominal tenderness  There is no guarding or rebound  Musculoskeletal:         General: No deformity  Normal range of motion  Comments: Patient has mild bilateral lower extremity edema  There is also a mild amount of edema of the left upper extremity  Skin:     General: Skin is warm and dry  Neurological:      General: No focal deficit present  Mental Status: He is alert     Psychiatric:         Mood and Affect: Mood normal          Behavior: Behavior normal  ED Medications  Medications   potassium phosphates 30 mmol in sodium chloride 0 9 % 250 mL infusion (30 mmol Intravenous New Bag 9/17/21 1728)   potassium chloride (K-DUR,KLOR-CON) CR tablet 40 mEq (has no administration in time range)   potassium-sodium phosphateS (K-PHOS,PHOSPHA 250) -250 mg tablet 2 tablet (has no administration in time range)   potassium chloride oral solution 60 mEq (60 mEq Oral Given 9/17/21 1708)       Diagnostic Studies  Results Reviewed     Procedure Component Value Units Date/Time    Basic metabolic panel [[id number]]     Lab Status: No result Specimen: Blood     Phosphorus [[id number]]     Lab Status: No result Specimen: Blood     Blood gas, venous [[id number]]  (Abnormal) Collected: 09/17/21 1710    Lab Status: Final result Specimen: Blood from Line Updated: 09/17/21 1719     pH, Homero 7 550     pCO2, Homero 60 4 mm Hg      pO2, Homero 41 0 mm Hg      HCO3, Homero 51 7 mmol/L      Base Excess, Homero 25 1 mmol/L      O2 Content, Homero 14 4 ml/dL      O2 HGB, VENOUS 77 6 %     CBC and differential [[id number]]  (Abnormal) Collected: 09/17/21 1548    Lab Status: Final result Specimen: Blood from Central Venous Line Updated: 09/17/21 1651     WBC 8 30 Thousand/uL      RBC 4 05 Million/uL      Hemoglobin 13 3 g/dL      Hematocrit 38 0 %      MCV 94 fL      MCH 32 8 pg      MCHC 35 0 g/dL      RDW 14 4 %      MPV 10 3 fL      Platelets 90 Thousands/uL      nRBC 0 /100 WBCs      Neutrophils Relative 85 %      Immat GRANS % 1 %      Lymphocytes Relative 8 %      Monocytes Relative 6 %      Eosinophils Relative 0 %      Basophils Relative 0 %      Neutrophils Absolute 7 02 Thousands/µL      Immature Grans Absolute 0 06 Thousand/uL      Lymphocytes Absolute 0 69 Thousands/µL      Monocytes Absolute 0 51 Thousand/µL      Eosinophils Absolute 0 02 Thousand/µL      Basophils Absolute 0 00 Thousands/µL     Comprehensive metabolic panel [[id number]]  (Abnormal) Collected: 09/17/21 1548    Lab Status: Final result Specimen: Blood from Central Venous Line Updated: 09/17/21 1649     Sodium 137 mmol/L      Potassium 1 5 mmol/L      Chloride 81 mmol/L      CO2 >45 mmol/L      ANION GAP --     BUN 21 mg/dL      Creatinine 0 81 mg/dL      Glucose 132 mg/dL      Calcium 8 3 mg/dL      Corrected Calcium 9 5 mg/dL      AST 77 U/L       U/L      Alkaline Phosphatase 89 U/L      Total Protein 5 3 g/dL      Albumin 2 5 g/dL      Total Bilirubin 2 04 mg/dL      eGFR 80 ml/min/1 73sq m     Narrative:      [place] guidelines for Chronic Kidney Disease (CKD):     Stage 1 with normal or high GFR (GFR > 90 mL/min/1 73 square meters)    Stage 2 Mild CKD (GFR = 60-89 mL/min/1 73 square meters)    Stage 3A Moderate CKD (GFR = 45-59 mL/min/1 73 square meters)    Stage 3B Moderate CKD (GFR = 30-44 mL/min/1 73 square meters)    Stage 4 Severe CKD (GFR = 15-29 mL/min/1 73 square meters)    Stage 5 End Stage CKD (GFR <15 mL/min/1 73 square meters)  Note: GFR calculation is accurate only with a steady state creatinine    Phosphorus [[id number]]  (Abnormal) Collected: 09/17/21 1548    Lab Status: Final result Specimen: Blood from Central Venous Line Updated: 09/17/21 1645     Phosphorus 1 3 mg/dL     Magnesium [[id number]]  (Abnormal) Collected: 09/17/21 1548    Lab Status: Final result Specimen: Blood from Central Venous Line Updated: 09/17/21 1645     Magnesium 3 3 mg/dL                  No orders to display         Procedures  ECG 12 Lead Documentation Only    Date/Time: 9/17/2021 3:45 PM  Performed by: Heidi Stanford DO  Authorized by: Heidi Stanford DO     ECG reviewed by me, the ED Provider: yes    Patient location:  ED  Interpretation:     Interpretation: abnormal    Rate:     ECG rate:  58    ECG rate assessment: normal    Rhythm:     Rhythm: atrial fibrillation    Ectopy:     Ectopy: none    QRS:     QRS axis:  Left    QRS intervals:   Wide  Conduction:     Conduction: abnormal      Abnormal conduction: complete RBBB    ST segments:     ST segments:  Non-specific  T waves:     T waves: non-specific            ED Course  ED Course as of Sep 17 1817   Fri Sep 17, 2021   1713 Potassium(!!): 1 5   1713 CO2(!!): >45  Will order VBG  1713 Sodium: 137   1713 Creatinine: 0 81   1713 Will switch potassium to potassium phosphates  Phosphorus(!): 1 3   1718 [place] text sent to [address] [place] [address] for admission  1726 pH, Homero(!): 7 550   1726 pCO2, Homero(!): 60 4   1726 HCO3, Homero(!): 51 7   1726 pO2, Homero: 41 0   1726 Base Excess, Homero: 25 1               Identification of Seniors at Risk      Most Recent Value   (ISAR) Identification of Seniors at Risk   Before the illness or injury that brought you to the Emergency, did you need someone to help you on a regular basis? 1 Filed at: 09/17/2021 1508   In the last 24 hours, have you needed more help than usual?  1 Filed at: 09/17/2021 1508   Have you been hospitalized for one or more nights during the past 6 months? 1 Filed at: 09/17/2021 1508   In general, do you see well? 1 Filed at: 09/17/2021 1508   In general, do you have serious problems with your memory? 1 Filed at: 09/17/2021 1508   Do you take more than three different medications every day? 1 Filed at: 09/17/2021 1508   ISAR Score  6 Filed at: 09/17/2021 1508                              MDM  Number of Diagnoses or Management Options  History of lung cancer  Hypokalemia  Hypophosphatemia  Metabolic alkalosis  Diagnosis management comments: Patient is a 80 y o  male who presents to the ED for hypokalemia  Unclear etiology of patient's severe hypokalemia  May be related to recent diuretic use  May also be related to patient's current cancer treatment regimen  Plan:  Labs, EKG, admit for electrolyte repletion  Portions of the record may have been created with voice recognition software   Occasional wrong word or "sound a like" substitutions may have occurred due to the inherent limitations of voice recognition software  Read the chart carefully and recognize, using context, where substitutions have occurred  Amount and/or Complexity of Data Reviewed  Clinical lab tests: ordered and reviewed  Tests in the medicine section of CPT®: ordered and reviewed  Decide to obtain previous medical records or to obtain history from someone other than the patient: yes  Review and summarize past medical records: yes    Risk of Complications, Morbidity, and/or Mortality  Presenting problems: high  Diagnostic procedures: moderate  Management options: high    Patient Progress  Patient progress: stable      Disposition  Final diagnoses:   Hypokalemia   Hypophosphatemia   Metabolic alkalosis   History of lung cancer     Time reflects when diagnosis was documented in both MDM as applicable and the Disposition within this note     Time User Action Codes Description Comment    9/17/2021  5:24 PM Dorian Citizen Add [E87 6] Hypokalemia     9/17/2021  5:26 PM Dorian Citizen Add [E83 39] Hypophosphatemia     9/17/2021  7:44 PM Dorian Citizen Add [X51 4] Metabolic alkalosis     8/10/2119  7:44 PM Dorian Citizen Add [Z85 118] History of lung cancer       ED Disposition     ED Disposition Condition Date/Time Comment    Admit Stable Fri Sep 17, 2021  5:23 PM Case was discussed with Dr Abbi cMguire and the patient's admission status was agreed to be Admission Status: inpatient status to the service of Dr Abbi Mcguire  Follow-up Information    None         Patient's Medications   Discharge Prescriptions    No medications on file     No discharge procedures on file  PDMP Review       Value Time User    PDMP Reviewed  Yes 10/9/2020  9:55 Miriam Simmons MD           ED Provider  Attending physically available and evaluated [facility]  I managed the patient along with the ED Attending      Electronically Signed by         Scar Ledezma DO  09/17/21 1946

## 2021-09-17 NOTE — ASSESSMENT & PLAN NOTE
Likely due to poor oral intake and HCTZ use  Stop HCTZ  Aggressive K supplementation IV and PO  Monitor BMP q4h  Regular diet  Reports that he is feeling better after the first dose of potassium

## 2021-09-17 NOTE — LETTER
September 21, 2021     [address] [address] [address] 1938    Patient: Kaity Baumann   YOB: 1934   Date of Visit: 9/17/2021       Dear Dr Puma Conner: Thank you for referring Kaity Baumann to me for evaluation  Below are my notes for this consultation  If you have questions, please do not hesitate to call me  I look forward to following your patient along with you  Sincerely,        No name on file  CC: No Recipients  Sarah Paulino,   9/21/2021  1:16 PM  Signed      Progress Note - Nephrology   Kaity Baumann 80 y o  male MRN: [id number]  Unit/Bed#: [facility] 214-02 Encounter: [id number]      Assessment / Plan:  1  Severe hypokalemia-potassium 1 5 on admission, improved to 4 status post aggressive repletion  -suspect hypokalemia is in the setting HCTZ use as well as new onset diarrhea  -recommend d/c HCTZ indefinitely  -monitor off further K supplementation as diarrhea has resolved  -serum aldosterone and renin pending  -steroids can cause a hypokalemic alkalosis-consider reducing or weaning off steroids if possible  -f/u am BMP/mag levels    2  Severe metabolic alkalosis-continue chloride replacement with potassium chloride replacement as above, avoid normal saline in the setting of 3rd spacing, improving    3  Hypertension-blood pressure well controlled off HCTZ    4  Hypophosphatemia-resolved s/p repletion    5  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-has not been on chemotherapy or radiation, remains on steroids    6  Leg edema likely due to 3rd spacing in the setting of hypoalbuminemia from underlying malignancy and poor oral intake-continue holding diuretics, encourage nutritional supplement    7  Mild anemia - resolved, avoid DWIGHT in light of malignancy    Ok for d/c from renal perspective  Needs outpatient BMP and follow up with PCP  If K remains low, will need nephrology follow up as well  Continue holding HCTZ   Have d/w SLIM attending  Subjective: The patient denies any chest pain, shortness of breath, vomiting or diarrhea  No complaints  Objective:     Vitals: Blood pressure 138/87, pulse 71, temperature (!) 97 3 °F (36 3 °C), resp  rate 12, SpO2 95 %  ,There is no height or weight on file to calculate BMI  Temp (24hrs), Av 6 °F (36 4 °C), Min:97 3 °F (36 3 °C), Max:97 7 °F (36 5 °C)      Weight (last 2 days)     None            Intake/Output Summary (Last 24 hours) at 2021 1313  Last data filed at 2021 0850  Gross per 24 hour   Intake 440 ml   Output 450 ml   Net -10 ml     I/O last 24 hours: In: 12 [P O :560]  Out: 450 [Urine:450]        Physical Exam:   Physical Exam  Vitals reviewed  Constitutional:       General: He is not in acute distress  Appearance: He is well-developed  He is not diaphoretic  Comments: Thin, sitting up in chair   HENT:      Head: Normocephalic and atraumatic  Nose: Nose normal       Mouth/Throat:      Mouth: Mucous membranes are moist       Pharynx: No oropharyngeal exudate  Eyes:      General: No scleral icterus  Right eye: No discharge  Left eye: No discharge  Neck:      Thyroid: No thyromegaly  Cardiovascular:      Rate and Rhythm: Normal rate and regular rhythm  Heart sounds: Normal heart sounds  Pulmonary:      Effort: Pulmonary effort is normal       Breath sounds: Normal breath sounds  No wheezing or rales  Abdominal:      General: Bowel sounds are normal  There is no distension  Palpations: Abdomen is soft  Tenderness: There is no abdominal tenderness  Musculoskeletal:         General: Swelling (b/l LE edema, LUE edema) present  Cervical back: Neck supple  Lymphadenopathy:      Cervical: No cervical adenopathy  Skin:     General: Skin is warm and dry  Findings: No rash  Neurological:      General: No focal deficit present  Mental Status: He is alert        Comments: awake   Psychiatric:         Mood and Affect: Mood normal          Behavior: Behavior normal          Invasive Devices     Central Venous Catheter Line            Port A Cath Left Chest -- days                Medications:    Scheduled Meds:  Current Facility-Administered Medications   Medication Dose Route Frequency Provider Last Rate    acetaminophen  650 mg Oral Q6H PRN Jai Delacruz MD      aspirin  81 mg Oral Daily Benjamin Schafer MD      bisacodyl  10 mg Rectal Once Benjamin Schafer MD      finasteride  5 mg Oral Daily Benjamin Schafer MD      hydrocortisone  50 mg Oral TID Benjamin Schafer MD      levETIRAcetam  500 mg Oral Q12H Gray Joseph MD      ondansetron  4 mg Intravenous Q4H PRN Jai Delacruz MD      polyethylene glycol  17 g Oral BID Benjamin Schafer MD      potassium-sodium phosphateS  2 tablet Oral 4x Daily (with meals and at bedtime) Benjamin Schafer MD         PRN Meds:   acetaminophen    ondansetron    Continuous Infusions:         LAB RESULTS:      Results from last 7 days   Lab Units 09/21/21  0553 09/20/21  0626 09/19/21  2035 09/19/21  0650 09/19/21  0646 09/18/21  2219 09/18/21  0947 09/18/21  0007 09/17/21 2012 09/17/21  1548   WBC Thousand/uL 4 38 4 95  --   --   --   --   --   --   --  8 30   HEMOGLOBIN g/dL 12 7 11 9*  --   --   --   --   --   --   --  13 3   HEMATOCRIT % 38 0 34 7*  --   --   --   --   --   --   --  38 0   PLATELETS Thousands/uL 92* 82*  --   --   --   --   --   --   --  90*   NEUTROS PCT % 62 77*  --   --   --   --   --   --   --  85*   LYMPHS PCT % 29 16  --   --   --   --   --   --   --  8*   MONOS PCT % 7 7  --   --   --   --   --   --   --  6   EOS PCT % 1 0  --   --   --   --   --   --   --  0   POTASSIUM mmol/L 4 0 3 4* 3 4*  --  3 2* 3 2* 2 5* 2 1* 2 5* 1 5*   CHLORIDE mmol/L 103 99*  --   --  92* 90* 84* 83* 83* 81*   CO2 mmol/L 35* 40*  --   --  >45* >45* >45* >45* >45* >45*   BUN mg/dL 23 26*  --   --  26* 28* 25 24 26* 21   CREATININE mg/dL 0 56* 0 51*  --   --  0 55* 0 63 0 77 0 72 1  04 0 81   CALCIUM mg/dL 8 0* 7 8*  --   --  7 7* 7 5* 7 8* 7 6* 8 0* 8 3   ALK PHOS U/L  --   --   --   --   --   --   --   --   --  89   ALT U/L  --   --   --   --   --   --   --   --   --  103*   AST U/L  --   --   --   --   --   --   --   --   --  77*   MAGNESIUM mg/dL 2 0 2 0  --   --   --   --   --   --   --  3 3*   PHOSPHORUS mg/dL 3 4 3 4 3 1 2 7  --   --  2 0* 3 4 3 7 1 3*       CUTURES:  Lab Results   Component Value Date    URINECX 70,000-79,000 cfu/ml Pseudomonas aeruginosa (A) 2021    URINECX >100,000 cfu/ml Pseudomonas aeruginosa (A) 2021    URINECX 20,000-29,000 cfu/ml  06/15/2021    URINECX No Growth <1000 cfu/mL 2021                 Portions of the record may have been created with voice recognition software  Occasional wrong word or "sound a like" substitutions may have occurred due to the inherent limitations of voice recognition software  Read the chart carefully and recognize, using context, where substitutions have occurred  If you have any questions, please contact the dictating provider  Chioma Lyn  2021  5:47 PM  Signed   Pastoral Care Progress Note    2021  Patient: Petra Max : 1934  Admission Date & Time: 2021 1513  MRN: [id number] CSN: [id number]        Pt asked for communion and a blessing  Father Princess Garza is responding  Karley Pedro DO  2021  1:27 PM  Signed      Progress Note - Nephrology   Petra Max 80 y o  male MRN: [id number]  Unit/Bed#: CW2 214-02 Encounter: [id number]      Assessment / Plan:  1   Severe hypokalemia-potassium 1 5 on admission, improved to 3 4 status post aggressive repletion  -suspect hypokalemia is in the setting HCTZ use as well as new onset diarrhea  -recommend control of diarrhea  -would continue potassium chloride 40 mEq p o  Q 6 hours  -would avoid further HCTZ use  -serum aldosterone and remain pending  -urine potassium to creatinine ratio suggestive of renal loss but now with concern for GI loss in setting of diarrhea  -steroids can cause a hypokalemic alkalosis-consider reducing or weaning off steroids if possible  -f/u am BMP/mag levels    2  Severe metabolic alkalosis-continue chloride replacement with potassium chloride replacement as above, avoid normal saline in the setting of 3rd spacing    3  Hypertension-blood pressure well controlled off HCTZ    4  Hypophosphatemia-resolved s/p repletion    5  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-has not been on chemotherapy or radiation, remains on steroids    6  Leg edema likely due to 3rd spacing in the setting of hypoalbuminemia from underlying malignancy and poor oral intake-continue holding diuretics, encourage nutritional supplement    7  Mild anemia - Hgb 11 9, montior CBC, avoid DWIGHT in light of malignancy      Subjective: The patient feels he is depressed  He has decreased appetite with diarrhea  Denies nausea, vomiting, chest pain or shortness of breath  Objective:     Vitals: Blood pressure 162/84, pulse 73, temperature (!) 97 3 °F (36 3 °C), resp  rate 18, SpO2 97 %  ,There is no height or weight on file to calculate BMI  Temp (24hrs), Av 8 °F (36 6 °C), Min:97 3 °F (36 3 °C), Max:98 1 °F (36 7 °C)      Weight (last 2 days)     None            Intake/Output Summary (Last 24 hours) at 2021 1322  Last data filed at 2021 0542  Gross per 24 hour   Intake 1080 ml   Output 1425 ml   Net -345 ml     I/O last 24 hours: In: 1080 [P O :1080]  Out: 1625 [Urine:1625]        Physical Exam:   Physical Exam  Vitals reviewed  Constitutional:       General: He is not in acute distress  Appearance: He is well-developed  He is not diaphoretic  Comments: Thin, temporal wasting   HENT:      Head: Normocephalic and atraumatic  Nose: Nose normal       Mouth/Throat:      Mouth: Mucous membranes are dry  Pharynx: No oropharyngeal exudate  Eyes:      General: No scleral icterus  Right eye: No discharge  Left eye: No discharge  Neck:      Thyroid: No thyromegaly  Cardiovascular:      Rate and Rhythm: Normal rate and regular rhythm  Heart sounds: Normal heart sounds  Pulmonary:      Effort: Pulmonary effort is normal       Breath sounds: Normal breath sounds  No wheezing or rales  Abdominal:      General: Bowel sounds are normal  There is no distension  Palpations: Abdomen is soft  Tenderness: There is no abdominal tenderness  Musculoskeletal:         General: Swelling (b/l LE) present  Normal range of motion  Cervical back: Neck supple  Lymphadenopathy:      Cervical: No cervical adenopathy  Skin:     General: Skin is warm and dry  Findings: No rash  Neurological:      General: No focal deficit present  Mental Status: He is alert        Comments: awake   Psychiatric:      Comments: Flat affect         Invasive Devices     Central Venous Catheter Line            Port A Cath Left Chest -- days                Medications:    Scheduled Meds:  Current Facility-Administered Medications   Medication Dose Route Frequency Provider Last Rate    acetaminophen  650 mg Oral Q6H PRN Dorothy Kelly MD      aspirin  81 mg Oral Daily Eda Chow MD      bisacodyl  10 mg Rectal Once Eda Chow MD      finasteride  5 mg Oral Daily Eda Chow MD      hydrocortisone  50 mg Oral TID Eda Chow MD      levETIRAcetam  500 mg Oral Q12H [address] Eda Chow MD      ondansetron  4 mg Intravenous Q4H PRN Dorothy Kelly MD      polyethylene glycol  17 g Oral BID Eda Chow MD      potassium chloride  40 mEq Oral Q6H Dorothy Kelly MD      potassium-sodium phosphateS  2 tablet Oral 4x Daily (with meals and at bedtime) Eda Chow MD         PRN Meds:   acetaminophen    ondansetron    Continuous Infusions:         LAB RESULTS:      Results from last 7 days   Lab Units 09/20/21  0626 09/19/21 2035 09/19/21  0650 09/19/21  0646 09/18/21  3374 09/18/21  0947 09/18/21  0007 09/17/21 2012 09/17/21  1548   WBC Thousand/uL 4 95  --   --   --   --   --   --   --  8 30   HEMOGLOBIN g/dL 11 9*  --   --   --   --   --   --   --  13 3   HEMATOCRIT % 34 7*  --   --   --   --   --   --   --  38 0   PLATELETS Thousands/uL 82*  --   --   --   --   --   --   --  90*   NEUTROS PCT % 77*  --   --   --   --   --   --   --  85*   LYMPHS PCT % 16  --   --   --   --   --   --   --  8*   MONOS PCT % 7  --   --   --   --   --   --   --  6   EOS PCT % 0  --   --   --   --   --   --   --  0   POTASSIUM mmol/L 3 4* 3 4*  --  3 2* 3 2* 2 5* 2 1* 2 5* 1 5*   CHLORIDE mmol/L 99*  --   --  92* 90* 84* 83* 83* 81*   CO2 mmol/L 40*  --   --  >45* >45* >45* >45* >45* >45*   BUN mg/dL 26*  --   --  26* 28* 25 24 26* 21   CREATININE mg/dL 0 51*  --   --  0 55* 0 63 0 77 0 72 1 04 0 81   CALCIUM mg/dL 7 8*  --   --  7 7* 7 5* 7 8* 7 6* 8 0* 8 3   ALK PHOS U/L  --   --   --   --   --   --   --   --  89   ALT U/L  --   --   --   --   --   --   --   --  103*   AST U/L  --   --   --   --   --   --   --   --  77*   MAGNESIUM mg/dL 2 0  --   --   --   --   --   --   --  3 3*   PHOSPHORUS mg/dL 3 4 3 1 2 7  --   --  2 0* 3 4 3 7 1 3*       CUTURES:  Lab Results   Component Value Date    URINECX 70,000-79,000 cfu/ml Pseudomonas aeruginosa (A) 08/16/2021    URINECX >100,000 cfu/ml Pseudomonas aeruginosa (A) 06/25/2021    URINECX 20,000-29,000 cfu/ml  06/15/2021    URINECX No Growth <1000 cfu/mL 05/03/2021                 Portions of the record may have been created with voice recognition software  Occasional wrong word or "sound a like" substitutions may have occurred due to the inherent limitations of voice recognition software  Read the chart carefully and recognize, using context, where substitutions have occurred  If you have any questions, please contact the dictating provider          London Gore MD  9/20/2021 10:19 AM  Signed  [facility]  Progress Note Kenia Jerry 1934, 80 y o  male MRN: [id number]  Unit/Bed#: Paul Osorio 214-02 Encounter: [id number]  Primary Care Provider: Barby Bloom MD   Date and time admitted to hospital: 9/17/2021  3:13 PM      * Hypokalemia  Assessment & Plan  Possibly multifactorial secondary to poor oral intake coupled with chronic HCTZ use (now discontinued)  C/w aggressive intravenous/oral potassium supplementation - serum magnesium normal  Continue serial potassium monitoring - liberal diet  Appreciate nephrology input -> await renin/aldosterone testing - urine potassium of 46 8  Symptomatic complaints including muscle aches/joint swelling are waxing/waning    Physical deconditioning  Assessment & Plan  Multifactorial due to a constellation of medical issues  Discussed again with patient today -> states he is agreeable to consider skilled rehab as recommended by PT/OT - will touch base with case management    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   Assessment & Plan  Patient has requested no further oncologic workup or initiation of treatment  Developed gait instability last month and went to [facility], where 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 - due to his poor prognosis he has refused radiation/chemotherapy etc   C/w Keppra for seizure prophylaxis - c/w current Hydrocortisone regimen for edema  Reports improvement in gait abnormalities requested a wean off Hydrocortisone -> decreased to 50 mg q8h on admission, can be further tapered outpatient  Supportive care otherwise    Hypophosphatemia  Assessment & Plan  Serum phosphate currently normalized s/p repletion - continue to monitor    Atrial fibrillation  Assessment & Plan  Currently off all rate-controlling agents (previously on Toprol-XL) and anticoagulation    Metabolic alkalosis  Assessment & Plan  In the setting of diuretic use (HCTZ) and hypochloremia inhibiting bicarbonate excretion  Continue BMP monitoring - urine chloride of 15  Nephrology following - no further IV fluids secondary to anasarca    Anasarca  Assessment & Plan  Likely progress secondary to Hydrocortisone use for brain metastases in the setting of underlying malignancy in general  Conservative management with compression stockings to legs and ACE wraps arms  Currently avoiding diuretic use due to severe electrolyte abnormalities    Thrombocytopenia   Assessment & Plan  Monitor platelet count - monitor for bleeding      DVT Prophylaxis:  SCDs       Patient Centered Rounds:  I have performed bedside rounds and discussed plan of care with nursing today  Discussions with Specialists or Other Care Team Provider:  see above assessments if applicable    Education and Discussions with Family / Patient:  Patient at bedside - discussed with daughterParish, over the phone yesterday    Time Spent for Care:  32 minutes  More than 50% of total time spent on counseling and coordination of care as described above  Current Length of Stay: 3 day(s)    Current Patient Status: Inpatient   Certification Statement:  Patient will continue to require additional hospital stay due to assessments as noted above  Code Status: Level 3 - DNAR and DNI        Subjective:     Remains weak/fatigued  After further discussion, patient is now agreeable to consider skilled rehab, stating he understands the degree of his weakness  Objective:     Vitals:   Temp (24hrs), Av 8 °F (36 6 °C), Min:97 3 °F (36 3 °C), Max:98 1 °F (36 7 °C)    Temp:  [97 3 °F (36 3 °C)-98 1 °F (36 7 °C)] 97 3 °F (36 3 °C)  HR:  [60-78] 73  Resp:  [17-18] 18  BP: (154-171)/(83-93) 162/84  SpO2:  [94 %-97 %] 97 %  There is no height or weight on file to calculate BMI  Input and Output Summary (last 24 hours):        Intake/Output Summary (Last 24 hours) at 2021 1018  Last data filed at 2021 0542  Gross per 24 hour   Intake 1080 ml   Output 1425 ml   Net -345 ml       Physical Exam:     GENERAL:  Weak/fatigued  HEAD:  Normocephalic - atraumatic  EYES: PERRL - EOMI   MOUTH:  Mucosa moist  NECK:  Supple - full range of motion  CARDIAC:  Rate controlled - S1/S2 positive  PULMONARY:  Fairly clear to auscultation - nonlabored respirations at rest  ABDOMEN:  Soft - nontender/nondistended - active bowel sounds  MUSCULOSKELETAL:  Motor strength/range of motion quite deconditioned - distal LE pitting edema/anasarca present  NEUROLOGIC:  At baseline  SKIN:  Chronic wrinkles/blemishes   PSYCHIATRIC:  Mood/affect stable      Additional Data:     Labs & Recent Cultures:    Results from last 7 days   Lab Units 09/20/21  0626   WBC Thousand/uL 4 95   HEMOGLOBIN g/dL 11 9*   HEMATOCRIT % 34 7*   PLATELETS Thousands/uL 82*   NEUTROS PCT % 77*   LYMPHS PCT % 16   MONOS PCT % 7   EOS PCT % 0     Results from last 7 days   Lab Units 09/20/21  0626 09/17/21  1548   POTASSIUM mmol/L 3 4* 1 5*   CHLORIDE mmol/L 99* 81*   CO2 mmol/L 40* >45*   BUN mg/dL 26* 21   CREATININE mg/dL 0 51* 0 81   CALCIUM mg/dL 7 8* 8 3   ALK PHOS U/L  --  89   ALT U/L  --  103*   AST U/L  --  77*                                 Last 24 Hours Medication List:   Current Facility-Administered Medications   Medication Dose Route Frequency Provider Last Rate    acetaminophen  650 mg Oral Q6H PRN Linda Enciso MD      aspirin  81 mg Oral Daily Chano Randolph MD      bisacodyl  10 mg Rectal Once Chano Randolph MD      finasteride  5 mg Oral Daily Chano Randolph MD      hydrocortisone  50 mg Oral TID Chano Randolph MD      levETIRAcetam  500 mg Oral Q12H [address] Chano Randolph MD      ondansetron  4 mg Intravenous Q4H PRN Linda Enciso MD      polyethylene glycol  17 g Oral BID Chano Randolph MD      potassium chloride  40 mEq Oral Q6H Linda Enciso MD      potassium-sodium phosphateS  2 tablet Oral 4x Daily (with meals and at bedtime) Chano Randolph MD                      ** Please Note: This note is constructed using a voice recognition dictation system    An occasional wrong word/phrase or sound-a-like substitution may have been picked up by dictation device due to the inherent limitations of voice recognition software  Read the chart carefully and recognize, using reasonable context, where substitutions may have occurred  Excell MD Melody  9/19/2021  1:44 PM  Addendum  [address] [facility]  Progress Note Deric Nash 1934, 80 y o  male MRN: [id number]  Unit/Bed#: CW2 214-02 Encounter: [id number]  Primary Care Provider: Marie Ward MD   Date and time admitted to hospital: 9/17/2021  3:13 PM      * Hypokalemia  Assessment & Plan  Possibly multifactorial secondary to poor oral intake coupled with chronic HCTZ use (now discontinued)  C/w aggressive intravenous/oral potassium supplementation - serum magnesium normal  Continue serial potassium monitoring - liberal diet  Appreciate nephrology input -> pending renin/aldosterone testing - urine potassium of 46 8  Symptomatic complaints including muscle aches/joint swelling have improved    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   Assessment & Plan  Patient has requested no further oncologic workup or initiation of treatment  Developed gait instability last month and went to [facility], where 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 - due to his poor prognosis he has refused radiation/chemotherapy etc   C/w Keppra for seizure prophylaxis - c/w current Hydrocortisone regimen for edema  Reports improvement in gait abnormalities requested a wean off Hydrocortisone -> decreased to 50 mg q8h on admission, can be further tapered outpatient  Supportive care otherwise    Hypophosphatemia  Assessment & Plan  Serum phosphate currently normalized s/p repletion - continue to monitor    Atrial fibrillation  Assessment & Plan  Currently off all rate-controlling agents (previously on Toprol-XL) and anticoagulation    Metabolic alkalosis  Assessment & Plan  In the setting of diuretic use (HCTZ) and hypochloremia inhibiting bicarbonate excretion  Continue BMP monitoring - urine chloride of 15  Nephrology following - no further IV fluids secondary to anasarca    Anasarca  Assessment & Plan  Likely progress secondary to Hydrocortisone use for brain metastases in the setting of underlying malignancy in general  Conservative management with compression stockings to legs and ACE wraps arms  Currently avoiding diuretic use due to severe electrolyte abnormalities    Thrombocytopenia   Assessment & Plan  Monitor platelet count - monitor for bleeding      DVT Prophylaxis: SCDs       Patient Centered Rounds:  I have performed bedside rounds and discussed plan of care with nursing today  Discussions with Specialists or Other Care Team Provider:  see above assessments if applicable    Education and Discussions with Family / Patient:  Patient at bedside, along with daughter, Ying Yang, over the phone  Time Spent for Care:  32 minutes  More than 50% of total time spent on counseling and coordination of care as described above  Current Length of Stay: 2 day(s)    Current Patient Status: Inpatient   Certification Statement:  Patient will continue to require additional hospital stay due to assessments as noted above  Code Status: Level 3 - DNAR and DNI        Subjective:     Weakness/fatigue is waxing/waning  Still having bilateral lower extremity swelling, however, notes that swelling in his hands have improved  Objective:     Vitals:   Temp (24hrs), Av 2 °F (36 8 °C), Min:97 5 °F (36 4 °C), Max:98 7 °F (37 1 °C)    Temp:  [97 5 °F (36 4 °C)-98 7 °F (37 1 °C)] 98 7 °F (37 1 °C)  HR:  [56-94] 94  Resp:  [17-18] 18  BP: (126-159)/(62-96) 159/96  SpO2:  [92 %-98 %] 95 %  There is no height or weight on file to calculate BMI  Input and Output Summary (last 24 hours):        Intake/Output Summary (Last 24 hours) at 2021 1020  Last data filed at 2021 0829  Gross per 24 hour   Intake --   Output 1225 ml   Net -1225 ml Physical Exam:     GENERAL:  Weak/fatigued  HEAD:  Normocephalic - atraumatic  EYES: PERRL - EOMI   MOUTH:  Mucosa moist  NECK:  Supple - full range of motion  CARDIAC:  Rate controlled   PULMONARY:  Nonlabored respirations at rest  ABDOMEN:  Soft - nontender/nondistended - active bowel sounds  MUSCULOSKELETAL:  Motor strength/range of motion deconditioned - distal LE pitting edema/anasarca  NEUROLOGIC:  Alert/oriented at baseline  SKIN:  Chronic wrinkles/blemishes   PSYCHIATRIC:  Mood/affect stable      Additional Data:     Labs & Recent Cultures:    Results from last 7 days   Lab Units 09/17/21  1548   WBC Thousand/uL 8 30   HEMOGLOBIN g/dL 13 3   HEMATOCRIT % 38 0   PLATELETS Thousands/uL 90*   NEUTROS PCT % 85*   LYMPHS PCT % 8*   MONOS PCT % 6   EOS PCT % 0     Results from last 7 days   Lab Units 09/19/21  0646 09/17/21  1548   POTASSIUM mmol/L 3 2* 1 5*   CHLORIDE mmol/L 92* 81*   CO2 mmol/L >45* >45*   BUN mg/dL 26* 21   CREATININE mg/dL 0 55* 0 81   CALCIUM mg/dL 7 7* 8 3   ALK PHOS U/L  --  89   ALT U/L  --  103*   AST U/L  --  77*                                 Last 24 Hours Medication List:   Current Facility-Administered Medications   Medication Dose Route Frequency Provider Last Rate    acetaminophen  650 mg Oral Q6H PRN Deb Monge MD      aspirin  81 mg Oral Daily Lucio Isbell MD      bisacodyl  10 mg Rectal Once Lucio Isbell MD      finasteride  5 mg Oral Daily Lucio Isbell MD      hydrocortisone  50 mg Oral TID Lucio Isbell MD      levETIRAcetam  500 mg Oral Q12H [facility] Lucio Isbell MD      ondansetron  4 mg Intravenous Q4H PRN Deb Monge MD      polyethylene glycol  17 g Oral BID Lucio Isbell MD      potassium chloride  40 mEq Oral Q6H Deb Monge MD      potassium chloride  40 mEq Oral Once Tima Ramsey PA-C      potassium-sodium phosphateS  2 tablet Oral 4x Daily (with meals and at bedtime) Lucio Isbell MD                      ** Please Note: This note is constructed using a voice recognition dictation system  An occasional wrong word/phrase or sound-a-like substitution may have been picked up by dictation device due to the inherent limitations of voice recognition software  Read the chart carefully and recognize, using reasonable context, where substitutions may have occurred  Mary Ellen Limon MD  9/19/2021  6:45 AM  Signed  NEPHROLOGY PROGRESS NOTE   Ora Cockayne 80 y o  male MRN: [id number]  Unit/Bed#: CW2 214-02 Encounter: [id number]  Reason for Consult: Hypokalemia    ASSESSMENT AND PLAN:  Patient is a 49-year-old male with significant medical issues of lung cancer, brain metastasis, hyper tension, AFib, presented with poor p o  Intake, significant hypokalemia  We are consulted for hypokalemia management      Severe hypokalemia  -serum potassium 1 5 on admission now continue to improve to 3 2 last night with aggressive repletion   -suspect in the setting of use of HCTZ, metabolic alkalosis can cause trans cellular shift  -BMP results to follow today a m     Currently on potassium chloride 40 mEq every 6 hours and continue for today   -continue to avoid HCTZ and avoid in the future  -urine potassium 46 8, urine potassium/creatinine ratio 47 7 mEq/g suggestive of renal loss  -aggressive chloride replacement with correction of metabolic alkalosis further help with potassium  -check serum aldosterone, PRA  -no obvious clinical history of GI loss    -magnesium level stable  -occasionally steroid can cause hypokalemic alkalosis, if remains persistent may consider further reducing/weaning him off steroids     Severe metabolic alkalosis  -serum bicarb greater than 45, venous pH 7 55   -urine chloride 15  -will need aggressive chloride replacement  Agree with aggressive potassium chloride replacement as above   -status post IV normal saline trial yesterday    With significant 3rd spacing, will avoid for now   -continue to monitor bicarb level closely   -suspect component of contraction alkalosis  Patient denies any history of vomiting      Hypertension, blood pressure well controlled  Discontinue HCTZ and avoid in the future      Severe hypophosphatemia, serum phosphorus 2 0 yesterday lower  Replace as needed  Continue to monitor closely     Renal function overall stable, baseline serum creatinine 0 7 to 1 0      History of lung malignancy, metastatic to brain  Patient has not been on any chemotherapy or radiation  He remains on steroids      Leg edema, concern for 3rd spacing with low albumin underlying malignancy, poor p o  Intake  -holding diuretics for time being  Remains on room air    -encourage nutritional supplement     Discussed above plan in detail with primary team    SUBJECTIVE:  Patient seen and examined at bedside  He wants to go home today  He feels tired of being in the hospital and feels like he does not want to do any more treatment  He denies any chest pain, shortness of breath, nausea or vomiting      OBJECTIVE:  Current Weight:    Vitals:    09/18/21 2341   BP: 130/67   Pulse: 56   Resp:    Temp: 97 5 °F (36 4 °C)   SpO2: 92%       Intake/Output Summary (Last 24 hours) at 9/19/2021 0638  Last data filed at 9/18/2021 2304  Gross per 24 hour   Intake --   Output 1250 ml   Net -1250 ml     Wt Readings from Last 3 Encounters:   08/13/21 71 2 kg (157 lb)   08/11/21 71 2 kg (157 lb)   07/16/21 72 6 kg (160 lb)     Temp Readings from Last 3 Encounters:   09/18/21 97 5 °F (36 4 °C)   07/14/21 (!) 96 9 °F (36 1 °C)   06/10/21 98 °F (36 7 °C) (Tympanic)     BP Readings from Last 3 Encounters:   09/18/21 130/67   08/11/21 138/70   07/14/21 124/70     Pulse Readings from Last 3 Encounters:   09/18/21 56   07/14/21 75   06/15/21 69        Physical Examination:  General:  Lying in bed, no acute distress   Eyes:  No conjunctival pallor present  ENT:  External examination of ears and nose unremarkable  Neck:  No obvious lymphadenopathy appreciated  Respiratory:  Bilateral air entry present  CVS:  S1, S2 present, 1+ edema in legs  GI:  Soft, nondistended  CNS:  Active alert oriented x3  Skin:  No new rash in legs  Musculoskeletal:  No obvious new gross deformity noted    Medications:    Current Facility-Administered Medications:     acetaminophen (TYLENOL) tablet 650 mg, 650 mg, Oral, Q6H PRN, Minna Ridley MD    aspirin (ECOTRIN LOW STRENGTH) EC tablet 81 mg, 81 mg, Oral, Daily, Kendal Armendariz MD, 81 mg at 09/18/21 0916    bisacodyl (DULCOLAX) rectal suppository 10 mg, 10 mg, Rectal, Once, Kendal Armendariz MD    finasteride (PROSCAR) tablet 5 mg, 5 mg, Oral, Daily, Kendal Armendariz MD, 5 mg at 09/18/21 0916    hydrocortisone (CORTEF) tablet 50 mg, 50 mg, Oral, TID, Kendal Armendariz MD, 50 mg at 09/18/21 2300    levETIRAcetam (KEPPRA) tablet 500 mg, 500 mg, Oral, Q12H [address], Kendal Armendariz MD, 500 mg at 09/18/21 2258    ondansetron (ZOFRAN) injection 4 mg, 4 mg, Intravenous, Q4H PRN, Minna Ridley MD    polyethylene glycol (MIRALAX) packet 17 g, 17 g, Oral, BID, Kendal Armendariz MD, 17 g at 09/18/21 0923    potassium chloride (K-DUR,KLOR-CON) CR tablet 40 mEq, 40 mEq, Oral, Q6H, Minna Ridley MD, 40 mEq at 09/19/21 0631    potassium chloride (K-DUR,KLOR-CON) CR tablet 40 mEq, 40 mEq, Oral, Once, Kenneth Alegre PA-C    potassium-sodium phosphateS (K-PHOS,PHOSPHA 250) -250 mg tablet 2 tablet, 2 tablet, Oral, 4x Daily (with meals and at bedtime), Kendal Armendariz MD, 2 tablet at 09/18/21 2258    Laboratory Results:  Results from last 7 days   Lab Units 09/18/21  2219 09/18/21  0947 09/18/21  0007 09/17/21 2012 09/17/21  1548   WBC Thousand/uL  --   --   --   --  8 30   HEMOGLOBIN g/dL  --   --   --   --  13 3   HEMATOCRIT %  --   --   --   --  38 0   PLATELETS Thousands/uL  --   --   --   --  90*   SODIUM mmol/L 139 138 139 139 137   POTASSIUM mmol/L 3 2* 2 5* 2 1* 2 5* 1 5*   CHLORIDE mmol/L 90* 84* 83* 83* 81*   CO2 mmol/L >45* >45* >45* >45* >45*   BUN mg/dL 28* 25 24 26* 21   CREATININE mg/dL 0 63 0 77 0 72 1 04 0 81   CALCIUM mg/dL 7 5* 7 8* 7 6* 8 0* 8 3   MAGNESIUM mg/dL  --   --   --   --  3 3*   PHOSPHORUS mg/dL  --  2 0* 3 4 3 7 1 3*       No orders to display       Portions of the record may have been created with voice recognition software  Occasional wrong word or "sound a like" substitutions may have occurred due to the inherent limitations of voice recognition software  Read the chart carefully and recognize, using context, where substitutions have occurred        Zeynep Santos MD  9/18/2021 10:04 AM  Signed  [address] [facility]  Progress Note Allen Fierro 1934, 80 y o  male MRN: [id number]  Unit/Bed#: CW2 214-02 Encounter: [id number]  Primary Care Provider: Mago Dawkins MD   Date and time admitted to hospital: 9/17/2021  3:13 PM      * Hypokalemia  Assessment & Plan  Possibly multifactorial secondary to poor oral intake coupled with chronic HCTZ use (now discontinued)  C/w aggressive intravenous/oral potassium supplementation - serum magnesium normal  Continue serial potassium monitoring - liberal diet  Will appreciate nephrology input -> consider renin/aldosterone testing etc   Symptomatic complaints including muscle aches/joint swelling have improved    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   Assessment & Plan  Patient has requested no further oncologic workup or initiation of treatment  Developed gait instability last month and went to [facility], where 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 - due to his poor prognosis he has refused radiation/chemotherapy etc   C/w Keppra for seizure prophylaxis - c/w current Hydrocortisone regimen for edema  Reports improvement in gait abnormalities requested a wean off Hydrocortisone -> decreased to 50 mg q8h on admission, can be further tapered outpatient  Supportive care otherwise    Hypophosphatemia  Assessment & Plan  Serum phosphate normalized s/p repletion - continue to monitor    Atrial fibrillation  Assessment & Plan  Currently off all rate-controlling agents (previously on Toprol-XL) and anticoagulation    Metabolic alkalosis  Assessment & Plan  In the setting of diuretic use (HCTZ) and hypochloremia inhibiting bicarbonate excretion  Continue BMP monitoring  Nephrology to follow    Anasarca  Assessment & Plan  Likely secondary to Hydrocortisone use for brain metastases  Conservative management with compression stockings to legs and ACE wraps arms  Currently avoiding diuretic use due to severe electrolyte abnormalities    Thrombocytopenia   Assessment & Plan  Monitor platelet count - monitor for bleeding      DVT Prophylaxis:  SCDs      Patient Centered Rounds:  I have performed bedside rounds and discussed plan of care with nursing today  Discussions with Specialists or Other Care Team Provider:  see above assessments if applicable    Education and Discussions with Family / Patient:  Patient at bedside    Time Spent for Care:  32 minutes  More than 50% of total time spent on counseling and coordination of care as described above  Current Length of Stay: 1 day(s)    Current Patient Status: Inpatient   Certification Statement:  Patient will continue to require additional hospital stay due to assessments as noted above  Code Status: Level 3 - DNAR and DNI        Subjective:     Seen/examined earlier today  Still remains weak/fatigued, however, notes that his lower leg muscle cramps have improved along with joint aches in his hands  Denies chest pain or palpitations at this time  Objective:     Vitals:   Temp (24hrs), Av 1 °F (36 7 °C), Min:97 4 °F (36 3 °C), Max:98 7 °F (37 1 °C)    Temp:  [97 4 °F (36 3 °C)-98 7 °F (37 1 °C)] 97 4 °F (36 3 °C)  HR:  [62-69] 62  Resp:  [14-18] 14  BP: ()/(51-73) 127/73  SpO2:  [95 %-99 %] 99 %  There is no height or weight on file to calculate BMI       Input and Output Summary (last 24 hours): Intake/Output Summary (Last 24 hours) at 9/18/2021 1003  Last data filed at 9/18/2021 7988  Gross per 24 hour   Intake 38 7 ml   Output 500 ml   Net -461 3 ml       Physical Exam:     GENERAL:  Weak/fatigued  HEAD:  Normocephalic - atraumatic  EYES: PERRL - EOMI   MOUTH:  Mucosa moist  NECK:  Supple - full range of motion  CARDIAC:  Rate controlled   PULMONARY:  Nonlabored respirations at rest  ABDOMEN:  Soft - nontender/nondistended - active bowel sounds  MUSCULOSKELETAL:  Motor strength/range of motion deconditioned  NEUROLOGIC:  Alert/oriented at baseline  SKIN:  Chronic wrinkles/blemishes   PSYCHIATRIC:  Mood/affect stable      Additional Data:     Labs & Recent Cultures:    Results from last 7 days   Lab Units 09/17/21  1548   WBC Thousand/uL 8 30   HEMOGLOBIN g/dL 13 3   HEMATOCRIT % 38 0   PLATELETS Thousands/uL 90*   NEUTROS PCT % 85*   LYMPHS PCT % 8*   MONOS PCT % 6   EOS PCT % 0     Results from last 7 days   Lab Units 09/18/21  0007 09/17/21  1548   POTASSIUM mmol/L 2 1* 1 5*   CHLORIDE mmol/L 83* 81*   CO2 mmol/L >45* >45*   BUN mg/dL 24 21   CREATININE mg/dL 0 72 0 81   CALCIUM mg/dL 7 6* 8 3   ALK PHOS U/L  --  89   ALT U/L  --  103*   AST U/L  --  77*                                 Last 24 Hours Medication List:   Current Facility-Administered Medications   Medication Dose Route Frequency Provider Last Rate    acetaminophen  650 mg Oral Q6H PRN Roxy Butts MD      aspirin  81 mg Oral Daily Junie Reece MD      bisacodyl  10 mg Rectal Once Junie Reece MD      finasteride  5 mg Oral Daily Junie Reece MD      hydrocortisone  50 mg Oral TID Junie Reece MD      levETIRAcetam  500 mg Oral Q12H [address] Junie Reece MD      ondansetron  4 mg Intravenous Q4H PRN Roxy Butts MD      polyethylene glycol  17 g Oral BID Junie Reece MD      potassium chloride  40 mEq Oral Q6H Roxy Butts MD      potassium-sodium phosphateS  2 tablet Oral 4x Daily (with meals and at bedtime) [address] Beryle Acosta, MD                    ** Please Note: This note is constructed using a voice recognition dictation system  An occasional wrong word/phrase or sound-a-like substitution may have been picked up by dictation device due to the inherent limitations of voice recognition software  Read the chart carefully and recognize, using reasonable context, where substitutions may have occurred  **

## 2021-09-18 PROBLEM — C34.90 LUNG CANCER METASTATIC TO BRAIN (HCC): Status: ACTIVE | Noted: 2020-05-18

## 2021-09-18 PROBLEM — C79.31 LUNG CANCER METASTATIC TO BRAIN (HCC): Status: ACTIVE | Noted: 2020-05-18

## 2021-09-18 PROBLEM — C34.11 MALIGNANT NEOPLASM OF UPPER LOBE OF RIGHT LUNG (HCC): Status: RESOLVED | Noted: 2020-05-18 | Resolved: 2021-01-01

## 2021-09-18 NOTE — CONSULTS
Consultation - Nephrology   Ora Cockayne 80 y o  male MRN: [id number]  Unit/Bed#: Lizeth Henry 214-02 Encounter: [id number]    ASSESSMENT AND PLAN:  Patient is a 63-year-old male with significant medical issues of lung cancer, brain metastasis, hyper tension, AFib, presented with poor p o  Intake, significant hypokalemia  We are consulted for hypokalemia management  Severe hypokalemia  -serum potassium 1 5 on admission  -suspect in the setting of use of HCTZ, metabolic alkalosis can cause trans cellular shift  -serum potassium now slowly improving to 2 1  -status post potassium chloride 140 mEq yesterday  Also received total 80 mEq potassium chloride this a m     Agree with continuing 40 mEq every 6 hours for now   -discontinue HCTZ and avoid in the future  -repeat BMP results to follow  -check urine potassium, urine creatinine evaluate for renal loss of potassium  -aggressive chloride replacement with correction of metabolic alkalosis can further help with potassium  -check serum aldosterone, PRA  -no obvious clinical history of GI loss  Patient was constipated and took stool softeners with some loose stools prior to coming to the hospital   -magnesium level stable  -occasionally steroid can cause hypokalemic alkalosis    Severe metabolic alkalosis  -serum bicarb greater than 45, venous pH 7 55   -check urine chloride  -will need aggressive chloride replacement  Agree with aggressive potassium chloride replacement as above   -will give IV normal saline at 100 mL/hour for total 5 hours  -continue to monitor bicarb level closely   -suspect component of contraction alkalosis  Patient denies any history of vomiting  Hypertension, blood pressure well controlled with occasional low readings  Discontinue HCTZ and avoid in the future  Severe hypophosphatemia, now improved with replacement  Serum phosphorus 3 4  Renal function overall stable, baseline serum creatinine 0 7 to 1 0      History of lung malignancy, metastatic to brain  Patient has not been on any chemotherapy or radiation  He remains on steroids  Leg edema, concern for 3rd spacing with low albumin underlying malignancy, poor p o  Intake  -holding diuretics for time being  Remains on room air  May be intravascularly volume depleted  Giving gentle timed IV fluid as above for more chloride replacement  Discussed above plan in detail with primary team attending  HISTORY OF PRESENT ILLNESS:  Requesting Physician: Jorge Weinstein MD  Reason for Consult:  Hypokalemia    Paige Montero is a 80y o  year old male who was admitted to [facility] after presenting with generalized weakness, severe hypokalemia  A renal consultation is requested today for assistance in the management of hypokalemia  Old medical records including prior creatinine values, potassium level were reviewed from [facility] records and Care everywhere  Patient does have occasional intermittent hypokalemia issues in the past   He has been on hydrochlorothiazide for long time although this was switched to Lasix which he only took for one day and change back to HCTZ on his own  He does have leg edema issues  He denies any chest pain or shortness of breath  Has been having poor p o  Intake  Has been constipated and was taking stool softener with magnesium citrate for last couple days  Denies any urinary complaint  Patient has not been getting any treatment for his underlying malignancy except steroids  Overall generalized weakness      PAST MEDICAL HISTORY:  Past Medical History:   Diagnosis Date    AAA [facility]) [facility] [address] [facility]     Atrial fibrillation ([facility] )     Cancer ([facility] [facility] ) 2014    lung ca- right lung  stage 3     Carotid artery disease ([facility] [facility] )     Ear problems     Hyperlipidemia     Hypertension     PAD (peripheral artery disease) (HCC)     Pneumonia     PVD (peripheral vascular disease) ([facility] [address] )        PAST SURGICAL HISTORY:  Past Surgical History:   Procedure Laterality Date    BOWEL RESECTION      COLONOSCOPY      HERNIA REPAIR      X2    IR AORTAGRAM WITH RUN-OFF  2020    SEVERAL    IR AORTAGRAM WITH RUN-OFF  10/9/2020    IR LOWER EXTREMITY ANGIOGRAM  2020    IR LOWER EXTREMITY ANGIOGRAM  2021    VT CYSTOURETHROSCOPY W/IRRIG & EVAC CLOTS N/A 2021    Procedure: CYSTOSCOPY EVACUATION OF Gabi Snider;  Surgeon: Theodore Bright MD;  [facility];  Service: Urology    VT CYSTOURETHROSCOPY,FULGUR 2-5 CM LESN N/A 1/15/2021    Procedure: CYSTOSCOPY, TRANSURETHRAL RESECTION OF BLADDER TUMOR (TURBT); Surgeon: Dashawn Page MD;  Location: [address] [place] [address];  Service: Urology    VT [facility] 3RD+ ORD SLCTV ABDL PEL/[facility] Left 10/9/2020    Procedure: ARTERIOGRAM Left lower extremity agram right femoral access with SFA stenting/angioplasty;  Surgeon: Tracey Simmons MD;  [facility];  Service: Vascular    VT [facility] 3RD+ ORD SLCTV ABDL PEL/[facility] Right 2020    Procedure: ARTERIOGRAM RLE angiogram L fem access;  Surgeon: Azeem Miller MD;  [facility];  Service: Vascular    VT TRANSURETHRAL ELEC-SURG PROSTATECTOM N/A 3/2/2021    Procedure: CYSTOSCOPY, TRANSURETHRAL RESECTION OF Kendrick Tang;  Surgeon: Dashawn Page MD;  Location: [address] [place] [address];  Service: Urology    PROSTATE SURGERY      REMOVAL VENOUS PORT (PORT-A-CATH)         ALLERGIES:  Allergies   Allergen Reactions    Sulfa Antibiotics Shortness Of Breath     Other reaction(s): Other (See Comments)  SOB      Protamine      Other reaction(s):  Other (See Comments)  Did not work for patient         SOCIAL HISTORY:  Social History     Substance and Sexual Activity   Alcohol Use Yes    Comment: SOCIAL     Social History     Substance and Sexual Activity   Drug Use Never     Social History     Tobacco Use   Smoking Status Former Smoker    Years: 30 00    Types: Cigarettes    Quit date: 1988    Years since quittin 7   Smokeless Tobacco Never Used       FAMILY HISTORY:  Family History   Problem Relation Age of Onset    Alzheimer's disease Mother     Cancer Father        MEDICATIONS:    Current Facility-Administered Medications:     acetaminophen (TYLENOL) tablet 650 mg, 650 mg, Oral, Q6H PRN, Jorge Weinstein MD    aspirin (ECOTRIN LOW STRENGTH) EC tablet 81 mg, 81 mg, Oral, Daily, Tavia Moreira MD, 81 mg at 09/18/21 0916    bisacodyl (DULCOLAX) rectal suppository 10 mg, 10 mg, Rectal, Once, Tavia Moreira MD    finasteride (PROSCAR) tablet 5 mg, 5 mg, Oral, Daily, Tavia Moreira MD, 5 mg at 09/18/21 0916    hydrocortisone (CORTEF) tablet 50 mg, 50 mg, Oral, TID, Tavia Moreira MD, 50 mg at 09/18/21 0917    levETIRAcetam (KEPPRA) tablet 500 mg, 500 mg, Oral, Q12H [address], Tavia Moreira MD, 500 mg at 09/18/21 0916    ondansetron (ZOFRAN) injection 4 mg, 4 mg, Intravenous, Q4H PRN, Jorge Weinstein MD    polyethylene glycol (MIRALAX) packet 17 g, 17 g, Oral, BID, Tavia Moreira MD, 17 g at 09/18/21 9683    potassium chloride (K-DUR,KLOR-CON) CR tablet 40 mEq, 40 mEq, Oral, Q6H, Jorge Weinstein MD, 40 mEq at 09/18/21 0601    potassium-sodium phosphateS (K-PHOS,PHOSPHA 250) -250 mg tablet 2 tablet, 2 tablet, Oral, 4x Daily (with meals and at bedtime), Tavai Moreira MD, 2 tablet at 09/18/21 0914    REVIEW OF SYSTEMS:  Complete 10 point review of systems were obtained and discussed in length with the patient  Complete review of systems were negative / unremarkable except mentioned in the HPI section       PHYSICAL EXAM:  Current Weight:    First Weight:    Vitals:    09/18/21 0125   BP:    Pulse:    Resp:    Temp:    SpO2: 99%       Intake/Output Summary (Last 24 hours) at 9/18/2021 1013  Last data filed at 9/18/2021 0605  Gross per 24 hour   Intake 38 7 ml   Output 500 ml   Net -461 3 ml     Wt Readings from Last 3 Encounters:   08/13/21 71 2 kg (157 lb)   08/11/21 71 2 kg (157 lb)   07/16/21 72 6 kg (160 lb)     Temp Readings from Last 3 Encounters:   09/18/21 (!) 97 4 °F (36 3 °C)   07/14/21 (!) 96 9 °F (36 1 °C)   06/10/21 98 °F (36 7 °C) (Tympanic)     BP Readings from Last 3 Encounters:   09/18/21 127/73   08/11/21 138/70   07/14/21 124/70     Pulse Readings from Last 3 Encounters:   09/17/21 62   07/14/21 75   06/15/21 69        Physical Examination:  General:  Lying in bed, no acute distress  Eyes:  No conjunctival pallor present  ENT:  External examination of ears and nose unremarkable  Neck:  No obvious lymphadenopathy appreciated  Respiratory:  Bilateral entry present  CVS:  S1, S2 present  GI:  Soft, nondistended  CNS:  Active alert oriented x3  Extremities:  1+ edema in lower legs  Psych:  Conscious, coherent oriented  Skin:  No new rash in legs    Invasive Devices:      Lab Results:   Results from last 7 days   Lab Units 09/18/21  0007 09/17/21 2012 09/17/21  1548   WBC Thousand/uL  --   --  8 30   HEMOGLOBIN g/dL  --   --  13 3   HEMATOCRIT %  --   --  38 0   PLATELETS Thousands/uL  --   --  90*   POTASSIUM mmol/L 2 1* 2 5* 1 5*   CHLORIDE mmol/L 83* 83* 81*   CO2 mmol/L >45* >45* >45*   BUN mg/dL 24 26* 21   CREATININE mg/dL 0 72 1 04 0 81   CALCIUM mg/dL 7 6* 8 0* 8 3   MAGNESIUM mg/dL  --   --  3 3*   PHOSPHORUS mg/dL 3 4 3 7 1 3*       Other Studies:   No orders to display       Portions of the record may have been created with voice recognition software  Occasional wrong word or "sound a like" substitutions may have occurred due to the inherent limitations of voice recognition software  Read the chart carefully and recognize, using context, where substitutions have occurred

## 2021-09-18 NOTE — ASSESSMENT & PLAN NOTE
In the setting of diuretic use (HCTZ) and hypochloremia inhibiting bicarbonate excretion  Continue BMP monitoring  Nephrology to follow

## 2021-09-18 NOTE — ASSESSMENT & PLAN NOTE
Patient has requested no further oncologic workup or initiation of treatment  Developed gait instability last month and went to 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

## 2021-09-18 NOTE — ASSESSMENT & PLAN NOTE
Possibly multifactorial secondary to poor oral intake coupled with chronic HCTZ use (now discontinued)  C/w aggressive intravenous/oral potassium supplementation - serum magnesium normal  Continue serial potassium monitoring - liberal diet  Will appreciate nephrology input -> consider renin/aldosterone testing etc   Symptomatic complaints including muscle aches/joint swelling have improved

## 2021-09-18 NOTE — ED ATTENDING ATTESTATION
9/17/2021  I, Trudi Thurston MD, saw and evaluated the patient  I have discussed the patient with the resident/non-physician practitioner and agree with the resident's/non-physician practitioner's findings, Plan of Care, and MDM as documented in the resident's/non-physician practitioner's note, except where noted  All available labs and Radiology studies were reviewed  I was present for key portions of any procedure(s) performed by the resident/non-physician practitioner and I was immediately available to provide assistance  At this point I agree with the current assessment done in the Emergency Department  I have conducted an independent evaluation of this patient a history and physical is as follows:    ED Course     80year-old male, recently started diuretic, presenting to the emergency department for evaluation after outpatient labs demonstrated hypokalemia with potassium 1 5 and was repeated was 1 6  Patient denies any generalized weakness, lightheadedness, chest pain, shortness of abdominal pain  Ten systems reviewed negative except as noted history of present illness  Mildly cachectic elderly male sitting upright in the stretcher no acute distress  Head is normocephalic atraumatic  Eyelids lashes are normal   Mucous membranes are dry  Lungs are clear to auscultation bilaterally  Heart is regular rate rhythm with no murmurs rubs or gallops  Abdomen is slightly distended, soft, is nontender  You extremities with some lower extremity edema      Labs Reviewed   CBC AND DIFFERENTIAL - Abnormal       Result Value Ref Range Status    WBC 8 30  4 31 - 10 16 Thousand/uL Final    RBC 4 05  3 88 - 5 62 Million/uL Final    Hemoglobin 13 3  12 0 - 17 0 g/dL Final    Hematocrit 38 0  36 5 - 49 3 % Final    MCV 94  82 - 98 fL Final    MCH 32 8  26 8 - 34 3 pg Final    MCHC 35 0  31 4 - 37 4 g/dL Final    RDW 14 4  11 6 - 15 1 % Final    MPV 10 3  8 9 - 12 7 fL Final    Platelets 90 (*) 623 - 390 Thousands/uL Final    Comment: Manual Review of Smear Performed    nRBC 0  /100 WBCs Final    Neutrophils Relative 85 (*) 43 - 75 % Final    Immat GRANS % 1  0 - 2 % Final    Lymphocytes Relative 8 (*) 14 - 44 % Final    Monocytes Relative 6  4 - 12 % Final    Eosinophils Relative 0  0 - 6 % Final    Basophils Relative 0  0 - 1 % Final    Neutrophils Absolute 7 02  1 85 - 7 62 Thousands/µL Final    Immature Grans Absolute 0 06  0 00 - 0 20 Thousand/uL Final    Lymphocytes Absolute 0 69  0 60 - 4 47 Thousands/µL Final    Monocytes Absolute 0 51  0 17 - 1 22 Thousand/µL Final    Eosinophils Absolute 0 02  0 00 - 0 61 Thousand/µL Final    Basophils Absolute 0 00  0 00 - 0 10 Thousands/µL Final   COMPREHENSIVE METABOLIC PANEL - Abnormal    Sodium 137  136 - 145 mmol/L Final    Potassium 1 5 (*) 3 5 - 5 3 mmol/L Final    Comment: Verified by repeatanalysis  Chloride 81 (*) 100 - 108 mmol/L Final    CO2 >45 (*) 21 - 32 mmol/L Final    Comment: [id number]    ANION GAP     Final    Comment: Unable to calculate    BUN 21  5 - 25 mg/dL Final    Creatinine 0 81  0 60 - 1 30 mg/dL Final    Comment: Standardized to IDMS reference method    Glucose 132  65 - 140 mg/dL Final    Comment: If the patient is fasting, the ADA then defines impaired fasting glucose as > 100 mg/dL and diabetes as > or equal to 123 mg/dL  Specimen collection should occur prior to Sulfasalazine administration due to the potential for falsely depressed results  Specimen collection should occur prior to Sulfapyridine administration due to the potential for falsely elevated results  Calcium 8 3  8 3 - 10 1 mg/dL Final    Corrected Calcium 9 5  8 3 - 10 1 mg/dL Final    AST 77 (*) 5 - 45 U/L Final    Comment: Specimen collection should occur prior to Sulfasalazine administration due to the potential for falsely depressed results        (*) 12 - 78 U/L Final    Comment: Specimen collection should occur prior to Sulfasalazine and/or Sulfapyridine administration due to the potential for falsely depressed results  Alkaline Phosphatase 89  46 - 116 U/L Final    Total Protein 5 3 (*) 6 4 - 8 2 g/dL Final    Albumin 2 5 (*) 3 5 - 5 0 g/dL Final    Total Bilirubin 2 04 (*) 0 20 - 1 00 mg/dL Final    Comment: Use of this assay is not recommended for patients undergoing treatment with eltrombopag due to the potential for falsely elevated results      eGFR 80  ml/min/1 73sq m Final    Narrative:     National Kidney Disease Foundation guidelines for Chronic Kidney Disease (CKD):     Stage 1 with normal or high GFR (GFR > 90 mL/min/1 73 square meters)    Stage 2 Mild CKD (GFR = 60-89 mL/min/1 73 square meters)    Stage 3A Moderate CKD (GFR = 45-59 mL/min/1 73 square meters)    Stage 3B Moderate CKD (GFR = 30-44 mL/min/1 73 square meters)    Stage 4 Severe CKD (GFR = 15-29 mL/min/1 73 square meters)    Stage 5 End Stage CKD (GFR <15 mL/min/1 73 square meters)  Note: GFR calculation is accurate only with a steady state creatinine   MAGNESIUM - Abnormal    Magnesium 3 3 (*) 1 6 - 2 6 mg/dL Final   PHOSPHORUS - Abnormal    Phosphorus 1 3 (*) 2 3 - 4 1 mg/dL Final   BLOOD GAS, VENOUS - Abnormal    pH, Homero 7 550 (*) 7 300 - 7 400 Final    pCO2, Homero 60 4 (*) 42 0 - 50 0 mm Hg Final    pO2, Homero 41 0  35 0 - 45 0 mm Hg Final    HCO3, Homero 51 7 (*) 24 - 30 mmol/L Final    Base Excess, Homero 25 1  mmol/L Final    O2 Content, Homero 14 4  ml/dL Final    O2 HGB, VENOUS 77 6  60 0 - 80 0 % Final   BASIC METABOLIC PANEL   PHOSPHORUS   BASIC METABOLIC PANEL   PHOSPHORUS       No orders to display           Critical Care Time  Procedures

## 2021-09-18 NOTE — ASSESSMENT & PLAN NOTE
Likely secondary to Hydrocortisone use for brain metastases  Conservative management with compression stockings to legs and ACE wraps arms  Currently avoiding diuretic use due to severe electrolyte abnormalities

## 2021-09-18 NOTE — PROGRESS NOTES
[facility]  Progress Note Basil Cuff 1934, 80 y o  male MRN: [id number]  Unit/Bed#: CW2 214-02 Encounter: [id number]  Primary Care Provider: Inder Lucero MD   Date and time admitted to hospital: 9/17/2021  3:13 PM      * Hypokalemia  Assessment & Plan  Possibly multifactorial secondary to poor oral intake coupled with chronic HCTZ use (now discontinued)  C/w aggressive intravenous/oral potassium supplementation - serum magnesium normal  Continue serial potassium monitoring - liberal diet  Will appreciate nephrology input -> consider renin/aldosterone testing etc   Symptomatic complaints including muscle aches/joint swelling have improved    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 - due to his poor prognosis he has refused radiation/chemotherapy etc   C/w Keppra for seizure prophylaxis - c/w current Hydrocortisone regimen for edema  Reports improvement in gait abnormalities requested a wean off Hydrocortisone -> decreased to 50 mg q8h on admission, can be further tapered outpatient  Supportive care otherwise    Hypophosphatemia  Assessment & Plan  Serum phosphate normalized s/p repletion - continue to monitor    Atrial fibrillation  Assessment & Plan  Currently off all rate-controlling agents (previously on Toprol-XL) and anticoagulation    Metabolic alkalosis  Assessment & Plan  In the setting of diuretic use (HCTZ) and hypochloremia inhibiting bicarbonate excretion  Continue BMP monitoring  Nephrology to follow    Anasarca  Assessment & Plan  Likely secondary to Hydrocortisone use for brain metastases  Conservative management with compression stockings to legs and ACE wraps arms  Currently avoiding diuretic use due to severe electrolyte abnormalities    Thrombocytopenia   Assessment & Plan  Monitor platelet count - monitor for bleeding      DVT Prophylaxis:  SCDs      Patient Centered Rounds:  I have performed bedside rounds and discussed plan of care with nursing today  Discussions with Specialists or Other Care Team Provider:  see above assessments if applicable    Education and Discussions with Family / Patient:  Patient at bedside    Time Spent for Care:  32 minutes  More than 50% of total time spent on counseling and coordination of care as described above  Current Length of Stay: 1 day(s)    Current Patient Status: Inpatient   Certification Statement:  Patient will continue to require additional hospital stay due to assessments as noted above  Code Status: Level 3 - DNAR and DNI        Subjective:     Seen/examined earlier today  Still remains weak/fatigued, however, notes that his lower leg muscle cramps have improved along with joint aches in his hands  Denies chest pain or palpitations at this time  Objective:     Vitals:   Temp (24hrs), Av 1 °F (36 7 °C), Min:97 4 °F (36 3 °C), Max:98 7 °F (37 1 °C)    Temp:  [97 4 °F (36 3 °C)-98 7 °F (37 1 °C)] 97 4 °F (36 3 °C)  HR:  [62-69] 62  Resp:  [14-18] 14  BP: ()/(51-73) 127/73  SpO2:  [95 %-99 %] 99 %  There is no height or weight on file to calculate BMI  Input and Output Summary (last 24 hours):        Intake/Output Summary (Last 24 hours) at 2021 1003  Last data filed at 2021 5925  Gross per 24 hour   Intake 38 7 ml   Output 500 ml   Net -461 3 ml       Physical Exam:     GENERAL:  Weak/fatigued  HEAD:  Normocephalic - atraumatic  EYES: PERRL - EOMI   MOUTH:  Mucosa moist  NECK:  Supple - full range of motion  CARDIAC:  Rate controlled   PULMONARY:  Nonlabored respirations at rest  ABDOMEN:  Soft - nontender/nondistended - active bowel sounds  MUSCULOSKELETAL:  Motor strength/range of motion deconditioned  NEUROLOGIC:  Alert/oriented at baseline  SKIN:  Chronic wrinkles/blemishes   PSYCHIATRIC:  Mood/affect stable      Additional Data:     Labs & Recent Cultures:    Results from last 7 days   Lab Units 09/17/21  1548   WBC Thousand/uL 8 30   HEMOGLOBIN g/dL 13 3   HEMATOCRIT % 38 0   PLATELETS Thousands/uL 90*   NEUTROS PCT % 85*   LYMPHS PCT % 8*   MONOS PCT % 6   EOS PCT % 0     Results from last 7 days   Lab Units 09/18/21  0007 09/17/21  1548   POTASSIUM mmol/L 2 1* 1 5*   CHLORIDE mmol/L 83* 81*   CO2 mmol/L >45* >45*   BUN mg/dL 24 21   CREATININE mg/dL 0 72 0 81   CALCIUM mg/dL 7 6* 8 3   ALK PHOS U/L  --  89   ALT U/L  --  103*   AST U/L  --  77*                                 Last 24 Hours Medication List:   Current Facility-Administered Medications   Medication Dose Route Frequency Provider Last Rate    acetaminophen  650 mg Oral Q6H PRN Karina Morales MD      aspirin  81 mg Oral Daily Ether Lower, MD      bisacodyl  10 mg Rectal Once Ether Lower, MD      finasteride  5 mg Oral Daily Ether Lower, MD      hydrocortisone  50 mg Oral TID Ether Lower, MD      levETIRAcetam  500 mg Oral Q12H [address] Ether Lower, MD      ondansetron  4 mg Intravenous Q4H PRN Karina Morales MD      polyethylene glycol  17 g Oral BID Ether Lower, MD      potassium chloride  40 mEq Oral Q6H Karina Morales MD      potassium-sodium phosphateS  2 tablet Oral 4x Daily (with meals and at bedtime) Anette Paredes MD                    ** Please Note: This note is constructed using a voice recognition dictation system  An occasional wrong word/phrase or sound-a-like substitution may have been picked up by dictation device due to the inherent limitations of voice recognition software  Read the chart carefully and recognize, using reasonable context, where substitutions may have occurred  **

## 2021-09-19 NOTE — OCCUPATIONAL THERAPY NOTE
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     Atrial fibrillation    Thrombocytopenia     Hypophosphatemia    Anasarca    Metabolic alkalosis    Past Medical History  Past Medical History:   Diagnosis Date    AAA [facility]) [facility] )     Atrial fibrillation ([facility] )     Cancer ([facility] ) 2014    lung ca- right lung  stage 3     Carotid artery disease ([facility] )     Ear problems     Hyperlipidemia     Hypertension     PAD (peripheral artery disease) (HCC)     Pneumonia     PVD (peripheral vascular disease) (HCC)      Past Surgical History  Past Surgical History:   Procedure Laterality Date    BOWEL RESECTION      COLONOSCOPY      HERNIA REPAIR      X2    IR AORTAGRAM WITH RUN-OFF  5/29/2020    SEVERAL    IR AORTAGRAM WITH RUN-OFF  10/9/2020    IR LOWER EXTREMITY ANGIOGRAM  11/4/2020    IR LOWER EXTREMITY ANGIOGRAM  5/27/2021    SC CYSTOURETHROSCOPY W/IRRIG & EVAC CLOTS N/A 4/16/2021    Procedure: CYSTOSCOPY EVACUATION OF Reinaldo Ivans;  Surgeon: Nakia Moreira MD;  [facility];  Service: Urology    SC CYSTOURETHROSCOPY,FULGUR 2-5 CM LESN N/A 1/15/2021    Procedure: CYSTOSCOPY, TRANSURETHRAL RESECTION OF BLADDER TUMOR (TURBT);   Surgeon: Evelia Mandujano MD;  Location: [address] [place] [address];  Service: Urology    SC Franklyn Nura 3RD+ ORD SLCTV ABDL [facility] Left 10/9/2020    Procedure: ARTERIOGRAM Left lower extremity agram right femoral access with SFA stenting/angioplasty;  Surgeon: Luis E Simmons MD;  [facility];  Service: Vascular    SC Franklyn Gene 3RD+ ORD [address] [facility] Right 11/4/2020    Procedure: ARTERIOGRAM RLE angiogram L fem access;  Surgeon: Octavia Hardwick MD;  [facility];  Service: Vascular    SC TRANSURETHRAL ELEC-SURG PROSTATECTOM N/A 3/2/2021    Procedure: Donell Chen RESECTION OF [address];  Surgeon: Ge Whitlock MD;  Location: [address] [place] [address];  Service: Urology    PROSTATE SURGERY      REMOVAL VENOUS PORT (PORT-A-CATH)        09/19/21 1030   OT Last Visit   OT Visit Date 09/19/21   Note Type   Note type Evaluation   Restrictions/Precautions   Weight Bearing Precautions Per Order No   Other Precautions Chair Alarm; Bed Alarm;Cognitive; Fall Risk   Pain Assessment   Pain Assessment Tool Pain Assessment not indicated - pt denies pain   Home Living   Type of [address] Two level;Bed/bath upstairs;Stairs to enter with rails   Bathroom Shower/Tub Tub/shower unit   [employer] Standard   Prior Function   Level of [place] Independent with ADLs and functional mobility; Needs assistance with IADLs   Lives With Alone   Receives Help From Family   ADL Assistance Independent   IADLs Needs assistance   Falls in the last 6 months 1 to 4   Vocational Retired   Comments pt initially denies falls however when questioned about bruises on his back pt stated "maybe I fell against something"    Lifestyle   Autonomy I adls and mobility - family assists with iadls    Reciprocal Relationships supportive family - reports son and dtr check on him regularly    Service to Others retired    Intrinsic Gratification sedentary    Subjective   Subjective "I want to go home "   ADL   Eating Assistance 7  Independent   Grooming Assistance 5  Supervision/Setup   UB [address] 4  Minimal Assistance   LB [address] 3  Moderate Assistance   [address] 4  [address] 3  Moderate [address]  4  Minimal Assistance   Bed Mobility   Sit to Supine 4  Minimal assistance   Transfers   Sit to Stand 4  Minimal assistance   Stand to Sit 4  Minimal assistance   Stand pivot 4  Minimal assistance   Functional Mobility   Functional Mobility 4  Minimal assistance   Additional items Rolling walker   Balance   Static Sitting Fair +   Dynamic Sitting Fair   Static Standing Fair   Dynamic Standing Fair -   Ambulatory Fair -   Activity Tolerance   Activity Tolerance Patient limited by fatigue;Treatment limited secondary to medical complications (Comment)  (pt SOB with activity )   RUE Assessment   RUE Assessment WFL   LUE Assessment   LUE Assessment WFL   Cognition   Arousal/Participation Alert; Cooperative   Attention Attends with cues to redirect   Orientation Level Oriented to person;Oriented to place;Oriented to time;Oriented to situation  (not exact date )   Memory Decreased recall of precautions   Following Commands Follows one step commands without difficulty   Comments pt demonstrates limited insight and safety awareness    Assessment   Limitation Decreased ADL status; Decreased UE strength;Decreased Safe judgement during ADL;Decreased cognition;Decreased endurance;Decreased self-care trans;Decreased high-level ADLs   Prognosis Fair   Assessment Pt is a 80 y o  male who was [facility] on [facility]/17/2021 with Hypokalemia   Pt's problem list also includes PMH of HTN, previous surgery, cancer history and afib, hld, bladder ca, lung ca, hemorrhagic brain mets  At baseline pt was completing adls and mobility independently - son/dtr assist with iadls  Pt lives alone in 2 story home with 2nd floor bed/bath  Currently pt requires moderate assist for overall ADLS and min assist for functional mobility/transfers  Pt currently presents with impairments in the following categories -steps to enter environment, limited home support, difficulty performing ADLS, difficulty performing IADLS , limited insight into deficits, health management  and environment activity tolerance, endurance, standing balance/tolerance, insight, safety  and judgement    These impairments, as well as pt's fatigue, SOB, HUGHES, decreased caregiver support, risk for falls and home environment  limit pt's ability to safely engage in all baseline areas of occupation, includingbathing, dressing, toileting, functional mobility/transfers, community mobility, laundry , house maintenance, medication management, meal prep, cleaning, social participation  and leisure activities  From OT standpoint, recommend inpt rehab upon D/C if pt declines rehab - it is recommended that he reside with someone where he will have increased support OT will continue to follow to address the below stated goals  Goals   Patient Goals go home    LTG Time Frame 10-14   Long Term Goal #1 refer to established goals below    Plan   Treatment Interventions ADL retraining;Functional transfer training; Endurance training;Cognitive reorientation;Patient/family training;Equipment evaluation/education; Compensatory technique education; Energy conservation; Activityengagement   Goal Expiration Date 10/03/21   OT Frequency 3-5x/wk   Recommendation   OT Discharge Recommendation Post acute rehabilitation services   AM-PAC Daily Activity Inpatient   Lower Body Dressing 2   Bathing 2   Toileting 3   Upper Body Dressing 3   Grooming 4   Eating 4   Daily Activity Raw Score 18   Daily Activity Standardized Score (Calc for Raw Score >=11) 38 66   AM-PAC Applied Cognition Inpatient   Following a Speech/Presentation 3   Understanding Ordinary Conversation 4   Taking Medications 3   Remembering Where Things Are Placed or Put Away 3   Remembering List of 4-5 Errands 3   Taking Care of Complicated Tasks 3   Applied Cognition Raw Score 19   Applied Cognition Standardized Score 39 77       OCCUPATIONAL THERAPY GOALS:    *Mod I adls after setup with use of AE PRN  *Mod I toileting and clothing management   *Mod I functional mobility and transfers to/from all surfaces with fair+ to  good dynamic balance and safety for participation in dynamic adls and iadl tasks   *Demonstrate good carryover with safe use of RW during functional tasks   *Assess DME needs   *Increase activity tolerance to 35-40 minutes for participation in adls and enjoyable activities  *Pt to participate in ongoing functional cognitive assessment with good attention/participation to assist with safe d/c recommendations    The patient's raw score on the AM-PAC Daily Activity inpatient short form is 18, standardized score is 38 66, less than 39 4  Patients at this level are likely to benefit from discharge to post-acute rehabilitation services  Please refer to the recommendation of the Occupational Therapist for safe discharge planning      Elzbieta Dumont

## 2021-09-19 NOTE — ASSESSMENT & PLAN NOTE
Likely progress secondary to Hydrocortisone use for brain metastases in the setting of underlying malignancy in general  Conservative management with compression stockings to legs and ACE wraps arms  Currently avoiding diuretic use due to severe electrolyte abnormalities

## 2021-09-19 NOTE — CASE MANAGEMENT
Met with pt and emiliahtleela, explained CM program/CM role  LOS-2  Bundle-no  Unplanned readmission color-green  30 day admission-no  Resides alone, dghter 2 minutes away, family with pt at night  I PTA for ADL's/ambulation walker, does not drive, retired  PCP Dr Bowen Medina to [address]  Discussed PT recommendation for rehab  Provided names of facilities  Would like to speak with son/brother before deciding  DME: walker  Denied IP rehab, MH illness, D&A abuse  Primary contact aylin peggy-[phone]  No POA/LW  Family will transport home    CM reviewed d/c planning process including the following: identifying help at home, patient preference for d/c planning needs, Discharge Lounge, [facility] Meds to Bed program, availability of treatment team to discuss questions or concerns patient and/or family may have regarding understanding medications and recognizing signs and symptoms once discharged  CM also encouraged patient to follow up with all recommended appointments after discharge  Patient advised of importance for patient and family to participate in managing patients medical well being  Patient/caregiver received discharge checklist  Content reviewed  Patient/caregiver encouraged to participate in discharge plan of care prior to discharge home

## 2021-09-19 NOTE — PHYSICAL THERAPY NOTE
PHYSICAL THERAPY EVALUATION  NAME:  Jose David Lewis  DATE: 09/19/21    AGE:   80 y o  Mrn:   [id number]  ADMIT DX:  Hypokalemia [Z61 8]  Metabolic alkalosis [R06 0]  Hypophosphatemia [E83 39]  Abnormal laboratory test result [R89 9]  History of lung cancer [Z85 118]    Past Medical History:   Diagnosis Date    AAA (ab[facility] aneurysm) ([facility] )     Atrial fibrillation ([facility] )     Cancer ([place] [address] ) 2014    lung ca- right lung  stage 3     Carotid artery disease ([place] [address] )     Ear problems     Hyperlipidemia     Hypertension     [facility]     Pneumonia     PVD (peripheral vascular disease) ([place] [address] )        Past Surgical History:   Procedure Laterality Date    BOWEL RESECTION      COLONOSCOPY      HERNIA REPAIR      X2    IR AORTAGRAM WITH RUN-OFF  5/29/2020    SEVERAL    IR AORTAGRAM WITH RUN-OFF  10/9/2020    IR LOWER EXTREMITY ANGIOGRAM  11/4/2020    IR LOWER EXTREMITY ANGIOGRAM  5/27/2021    VT CYSTOURETHROSCOPY W/IRRIG & EVAC CLOTS N/A 4/16/2021    Procedure: CYSTOSCOPY EVACUATION OF Arletta Endwell;  Surgeon: Vitor Jones MD;  [facility];  Service: Urology    VT CYSTOURETHROSCOPY,FULGUR 2-5 CM LESN N/A 1/15/2021    Procedure: CYSTOSCOPY, TRANSURETHRAL RESECTION OF BLADDER TUMOR (TURBT);   Surgeon: Tish Fuller MD;  Location: [address] [place] [address];  Service: Urology    VT Vianey Parks 3RD+ ORD SLCTV ABDL PEL/TR [facility] Left 10/9/2020    Procedure: ARTERIOGRAM Left lower extremity agram right femoral access with SFA stenting/angioplasty;  Surgeon: Karis Simmons MD;  [facility];  Service: Vascular    VT Vianey Smiths 3RD+ ORD SLCTV ABDL PEL/LXTR [facility] Right 11/4/2020    Procedure: ARTERIOGRAM RLE angiogram L fem access;  Surgeon: Dara Beard MD;  [facility];  Service: Vascular    VT TRANSURETHRAL ELEC-SURG PROSTATECTOM N/A 3/2/2021    Procedure: CYSTOSCOPY, TRANSURETHRAL RESECTION OF Shawn Bustard;  Surgeon: Tish Fuller MD;  Location: [facility] MAIN OR;  Service: Urology    PROSTATE SURGERY      REMOVAL VENOUS PORT (PORT-A-CATH)         Length Of Stay: 2    PHYSICAL THERAPY EVALUATION:        09/19/21 1020   Note Type   Note type Evaluation   Pain Assessment   Pain Assessment Tool Pain Assessment not indicated - pt denies pain   Home Living   Type of [address] Two level;Bed/bath upstairs;Stairs to enter with rails  (2-3 BELINDA , full flight to bedroom )   Home Equipment Walker;Cane   Additional Comments Patient reports living alone, but states he has local son and daughter check in on him regularly  Prior Function   Level of [place] Independent with ADLs and functional mobility   Lives With Alone   Receives Help From Family   ADL Assistance Independent   Falls in the last 6 months 1 to 4  (pt states " I may have fallen before")   Vocational Retired   Comments Patient denies use of assistive device for ambulation prior to admission   Restrictions/Precautions   Weight Bearing Precautions Per Order No   Other Precautions Cognitive; Chair Alarm; Bed Alarm; Fall Risk   General   Family/Caregiver Present No   Cognition   Arousal/Participation Alert   Attention Attends with cues to redirect   Orientation Level Oriented X4   Memory Decreased recall of precautions   Following Commands Follows one step commands without difficulty   RUE Assessment   RUE Assessment WFL   LUE Assessment   LUE Assessment WFL   RLE Assessment   RLE Assessment WFL   Strength RLE   RLE Overall Strength 4-/5   LLE Assessment   LLE Assessment WFL   Strength LLE   LLE Overall Strength 4-/5   Bed Mobility   Sit to Supine 4  Minimal assistance   Additional items Assist x 1; Increased time required;LE management;Verbal cues   Transfers   Sit to Stand 4  Minimal assistance   Additional items Assist x 1; Increased time required;Verbal cues   Stand to Sit 4  Minimal assistance   Additional items Assist x 1; Increased time required;Verbal cues   Additional Comments VC and TC needed for hand placement and safety    Ambulation/Elevation   Gait pattern Excessively slow; Short stride; Foward flexed; Inconsistent will   Gait Assistance 4  Minimal assist   Additional items Assist x 1   Assistive Device Rolling walker   Distance 35ft x 2    Balance   Static Sitting Fair   Static Standing Fair -   Ambulatory Poor +   Endurance Deficit   Endurance Deficit Yes   Endurance Deficit Description fatigue   Activity Tolerance   Activity Tolerance Patient limited by fatigue  (SOB with activity )   Medical Staff Made Aware Paty Goldberg, OT; OT present for co evaluation due to pts unstable presentation and decreased activity tolerance which all impact pts overall physical performance    Nurse Made Aware Patient appropriate to be seen and mobilized per nursing   Assessment   Prognosis Good   Problem List Decreased strength;Decreased endurance; Impaired balance;Decreased mobility; Impaired judgement;Decreased safety awareness   Assessment Pt is 80 y o  male seen for PT evaluation s/p admit to [facility] on 9/17/2021  Two pt identifiers were used to confirm  Pt presented w/ abnormal outpt blood work  Patient's potassium was noted to be significantly low by cardiologist and pt presented to ED  Pt was admitted with a primary dx of:  Hypokalemia and other active problems including 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, hypophosphatemia, AFib, metabolic alkalosis, anasarca, thrombocytopenia  PT now consulted for assessment of mobility and d/c needs  Pt with Up with assistance orders  Pts current co morbidities affecting treatment include:  Abdominal aortic aneurysm, AFib, lung cancer, CAD, HLD, HTN, PAD, PVD, brain metastases   Pts current clinical presentation is Unstable/ Unpredictable (high complexity) due to Ongoing medical management for primary dx, Increased reliance on more restrictive AD compared to baseline, Decreased activity tolerance compared to baseline, Fall risk, Increased assistance needed from caregiver at current time, Continuous pulse oximetry monitoring     Upon evaluation, pt currently is requiring Min Ax1 for bed mobility; Min Ax1 for transfers and Min Ax1 for ambulation w/ RW   Pt presents at PT eval functioning below baseline and currently w/ overall mobility deficits 2* to: BLE weakness, impaired balance, decreased endurance, gait deviations, decreased activity tolerance compared to baseline, decreased safety awareness, fall risk  Pt currently at a fall risk 2* to impairments listed above  Based on the aforementioned PT evaluation, pt will continue to benefit from skilled Acute PT interventions to address stated impairments; to maximize functional mobility; for ongoing pt/ family training; and DME needs  At conclusion of PT session pt returned BTB with phone and call bell within reach  Pt denies any further questions at this time  PT is currently recommending Rehab  PT will continue to follow during hospital stay  Barriers to Discharge Inaccessible home environment;Decreased caregiver support   Goals   Patient Goals " to go home"   STG Expiration Date 09/29/21   Short Term Goal #1 In 10 days pt will complete: 1) Bed mobility skills with mod I to increase safety and independence as well as decrease caregiver burden  2) Functional transfers with mod I to promote increased independence, safety, and QOL  3) Ambulate 200' using least restrictive AD with mod I without LOB and stable vitals so that pt can negotiate previous living environment safely and promote independence with functional mobility and return to PLOF  4) Stair training up/ down 10 step/s using rail/s with mod I so that pt can enter/negotiate previous living environment safely and decrease fall risk  5) Improve balance grades by 1/2 grade to increase safety with all mobility and decrease fall risk  6) Improve BLE strength by 1/2 grade to help increase overall functional mobility and decrease fall risk      Plan Treatment/Interventions Functional transfer training;LE strengthening/ROM; Elevations; Therapeutic exercise; Endurance training;Patient/family training;Equipment eval/education; Bed mobility;Gait training;Spoke to nursing;OT   PT Frequency Other (Comment)  (3-5x a week )   Recommendation   PT Discharge Recommendation Post acute rehabilitation services   Equipment Recommended [facility] Recommended Wheeled walker   PT - OK to Discharge Yes  (to rehab when medically cleared )   AM-PAC Basic Mobility Inpatient   Turning in Bed Without Bedrails 3   Lying on Back to Sitting on Edge of Flat Bed 3   Moving Bed to Chair 3   Standing Up From Chair 3   Walk in Room 3   Climb 3-5 Stairs 2   Basic Mobility Inpatient Raw Score 17   Basic Mobility Standardized Score 39 67   Modified [place] Scale   Modified Rob Scale 4   Barthel Index   Feeding 10   Bathing 0   Grooming Score 5   Dressing Score 5   Bladder Score 10   Bowels Score 10   Toilet Use Score 5   Transfers (Bed/Chair) Score 10   Mobility (Level Surface) Score 0   Stairs Score 0   Barthel Index Score 55   Portions of the documentation may have been created using voice recognition software  Occasional wrong word or sound alike substitutions may have occurred due to the inherent limitations of the voice recognition software  Read the chart carefully and recognize, using context, where substitutions have occurred      Wilfrid Sutton PT, DPT

## 2021-09-19 NOTE — ASSESSMENT & PLAN NOTE
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

## 2021-09-19 NOTE — PROGRESS NOTES
NEPHROLOGY PROGRESS NOTE   Angela Cole 80 y o  male MRN: [id number]  Unit/Bed#: CW2 214-02 Encounter: [id number]  Reason for Consult: Hypokalemia    ASSESSMENT AND PLAN:  Patient is a 19-year-old male with significant medical issues of lung cancer, brain metastasis, hyper tension, AFib, presented with poor p o  Intake, significant hypokalemia  We are consulted for hypokalemia management      Severe hypokalemia  -serum potassium 1 5 on admission now continue to improve to 3 2 last night with aggressive repletion   -suspect in the setting of use of HCTZ, metabolic alkalosis can cause trans cellular shift  -BMP results to follow today a m     Currently on potassium chloride 40 mEq every 6 hours and continue for today   -continue to avoid HCTZ and avoid in the future  -urine potassium 46 8, urine potassium/creatinine ratio 47 7 mEq/g suggestive of renal loss  -aggressive chloride replacement with correction of metabolic alkalosis further help with potassium  -check serum aldosterone, PRA  -no obvious clinical history of GI loss    -magnesium level stable  -occasionally steroid can cause hypokalemic alkalosis, if remains persistent may consider further reducing/weaning him off steroids     Severe metabolic alkalosis  -serum bicarb greater than 45, venous pH 7 55   -urine chloride 15  -will need aggressive chloride replacement  Agree with aggressive potassium chloride replacement as above   -status post IV normal saline trial yesterday  With significant 3rd spacing, will avoid for now   -continue to monitor bicarb level closely   -suspect component of contraction alkalosis  Patient denies any history of vomiting      Hypertension, blood pressure well controlled  Discontinue HCTZ and avoid in the future      Severe hypophosphatemia, serum phosphorus 2 0 yesterday lower  Replace as needed    Continue to monitor closely     Renal function overall stable, baseline serum creatinine 0 7 to 1 0      History of lung malignancy, metastatic to brain  Patient has not been on any chemotherapy or radiation  He remains on steroids      Leg edema, concern for 3rd spacing with low albumin underlying malignancy, poor p o  Intake  -holding diuretics for time being  Remains on room air    -encourage nutritional supplement     Discussed above plan in detail with primary team    SUBJECTIVE:  Patient seen and examined at bedside  He wants to go home today  He feels tired of being in the hospital and feels like he does not want to do any more treatment  He denies any chest pain, shortness of breath, nausea or vomiting      OBJECTIVE:  Current Weight:    Vitals:    09/18/21 2341   BP: 130/67   Pulse: 56   Resp:    Temp: 97 5 °F (36 4 °C)   SpO2: 92%       Intake/Output Summary (Last 24 hours) at 9/19/2021 0998  Last data filed at 9/18/2021 2304  Gross per 24 hour   Intake --   Output 1250 ml   Net -1250 ml     Wt Readings from Last 3 Encounters:   08/13/21 71 2 kg (157 lb)   08/11/21 71 2 kg (157 lb)   07/16/21 72 6 kg (160 lb)     Temp Readings from Last 3 Encounters:   09/18/21 97 5 °F (36 4 °C)   07/14/21 (!) 96 9 °F (36 1 °C)   06/10/21 98 °F (36 7 °C) (Tympanic)     BP Readings from Last 3 Encounters:   09/18/21 130/67   08/11/21 138/70   07/14/21 124/70     Pulse Readings from Last 3 Encounters:   09/18/21 56   07/14/21 75   06/15/21 69        Physical Examination:  General:  Lying in bed, no acute distress   Eyes:  No conjunctival pallor present  ENT:  External examination of ears and nose unremarkable  Neck:  No obvious lymphadenopathy appreciated  Respiratory:  Bilateral air entry present  CVS:  S1, S2 present, 1+ edema in legs  GI:  Soft, nondistended  CNS:  Active alert oriented x3  Skin:  No new rash in legs  Musculoskeletal:  No obvious new gross deformity noted    Medications:    Current Facility-Administered Medications:     acetaminophen (TYLENOL) tablet 650 mg, 650 mg, Oral, Q6H PRN, Hilario Lefort, MD    aspirin (ECOTRIN LOW STRENGTH) EC tablet 81 mg, 81 mg, Oral, Daily, Benjamin Schafer MD, 81 mg at 09/18/21 0916    bisacodyl (DULCOLAX) rectal suppository 10 mg, 10 mg, Rectal, Once, Benjamin Schafer MD    finasteride (PROSCAR) tablet 5 mg, 5 mg, Oral, Daily, Benjamin Schafer MD, 5 mg at 09/18/21 0916    hydrocortisone (CORTEF) tablet 50 mg, 50 mg, Oral, TID, Benjamin Schafer MD, 50 mg at 09/18/21 2300    levETIRAcetam (KEPPRA) tablet 500 mg, 500 mg, Oral, Q12H [address], Benjamin Schafer MD, 500 mg at 09/18/21 2258    ondansetron (ZOFRAN) injection 4 mg, 4 mg, Intravenous, Q4H PRN, Jai Delacruz MD    polyethylene glycol (MIRALAX) packet 17 g, 17 g, Oral, BID, Benjamin Schafer MD, 17 g at 09/18/21 8793    potassium chloride (K-DUR,KLOR-CON) CR tablet 40 mEq, 40 mEq, Oral, Q6H, Jai Delacruz MD, 40 mEq at 09/19/21 0631    potassium chloride (K-DUR,KLOR-CON) CR tablet 40 mEq, 40 mEq, Oral, Once, Emerald Renteria PA-C    potassium-sodium phosphateS (K-PHOS,PHOSPHA 250) -250 mg tablet 2 tablet, 2 tablet, Oral, 4x Daily (with meals and at bedtime), Benjamin Schafer MD, 2 tablet at 09/18/21 2258    Laboratory Results:  Results from last 7 days   Lab Units 09/18/21  2219 09/18/21  0947 09/18/21  0007 09/17/21 2012 09/17/21  1548   WBC Thousand/uL  --   --   --   --  8 30   HEMOGLOBIN g/dL  --   --   --   --  13 3   HEMATOCRIT %  --   --   --   --  38 0   PLATELETS Thousands/uL  --   --   --   --  90*   SODIUM mmol/L 139 138 139 139 137   POTASSIUM mmol/L 3 2* 2 5* 2 1* 2 5* 1 5*   CHLORIDE mmol/L 90* 84* 83* 83* 81*   CO2 mmol/L >45* >45* >45* >45* >45*   BUN mg/dL 28* 25 24 26* 21   CREATININE mg/dL 0 63 0 77 0 72 1 04 0 81   CALCIUM mg/dL 7 5* 7 8* 7 6* 8 0* 8 3   MAGNESIUM mg/dL  --   --   --   --  3 3*   PHOSPHORUS mg/dL  --  2 0* 3 4 3 7 1 3*       No orders to display       Portions of the record may have been created with voice recognition software   Occasional wrong word or "sound a like" substitutions may have occurred due to the inherent limitations of voice recognition software  Read the chart carefully and recognize, using context, where substitutions have occurred

## 2021-09-19 NOTE — PLAN OF CARE
Problem: OCCUPATIONAL THERAPY ADULT  Goal: Performs self-care activities at highest level of function for planned discharge setting  See evaluation for individualized goals  Description: Treatment Interventions: ADL retraining, Functional transfer training, Endurance training, Cognitive reorientation, Patient/family training, Equipment evaluation/education, Compensatory technique education, Energy conservation, Activityengagement          See flowsheet documentation for full assessment, interventions and recommendations  Note: Limitation: Decreased ADL status, Decreased UE strength, Decreased Safe judgement during ADL, Decreased cognition, Decreased endurance, Decreased self-care trans, Decreased high-level ADLs  Prognosis: Fair  Assessment: Pt is a 80 y o  male who was admitted to [facility] on 9/17/2021 with Hypokalemia   Pt's problem list also includes PMH of HTN, previous surgery, cancer history and afib, hld, bladder ca, lung ca, hemorrhagic brain mets  At baseline pt was completing adls and mobility independently - son/dtr assist with iadls  Pt lives alone in 2 story home with 2nd floor bed/bath  Currently pt requires moderate assist for overall ADLS and min assist for functional mobility/transfers  Pt currently presents with impairments in the following categories -steps to enter environment, limited home support, difficulty performing ADLS, difficulty performing IADLS , limited insight into deficits, health management  and environment activity tolerance, endurance, standing balance/tolerance, insight, safety  and judgement    These impairments, as well as pt's fatigue, SOB, HUGHES, decreased caregiver support, risk for falls and home environment  limit pt's ability to safely engage in all baseline areas of occupation, includingbathing, dressing, toileting, functional mobility/transfers, community mobility, laundry , house maintenance, medication management, meal prep, cleaning, social participation  and leisure activities  From OT standpoint, recommend inpt rehab upon D/C if pt declines rehab - it is recommended that he reside with someone where he will have increased support OT will continue to follow to address the below stated goals        OT Discharge Recommendation: Post acute rehabilitation services

## 2021-09-19 NOTE — PROGRESS NOTES
[facility]  Progress Note Connie Whipple 1934, 80 y o  male MRN: [id number]  Unit/Bed#: CW2 214-02 Encounter: [id number]  Primary Care Provider: Pablo King MD   Date and time admitted to hospital: 9/17/2021  3:13 PM      * Hypokalemia  Assessment & Plan  Possibly multifactorial secondary to poor oral intake coupled with chronic HCTZ use (now discontinued)  C/w aggressive intravenous/oral potassium supplementation - serum magnesium normal  Continue serial potassium monitoring - liberal diet  Appreciate nephrology input -> pending renin/aldosterone testing - urine potassium of 46 8  Symptomatic complaints including muscle aches/joint swelling have improved    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   Assessment & Plan  Patient has requested no further oncologic workup or initiation of treatment  Developed gait instability last month and went to [facility], where 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 - due to his poor prognosis he has refused radiation/chemotherapy etc   C/w Keppra for seizure prophylaxis - c/w current Hydrocortisone regimen for edema  Reports improvement in gait abnormalities requested a wean off Hydrocortisone -> decreased to 50 mg q8h on admission, can be further tapered outpatient  Supportive care otherwise    Hypophosphatemia  Assessment & Plan  Serum phosphate currently normalized s/p repletion - continue to monitor    Atrial fibrillation  Assessment & Plan  Currently off all rate-controlling agents (previously on Toprol-XL) and anticoagulation    Metabolic alkalosis  Assessment & Plan  In the setting of diuretic use (HCTZ) and hypochloremia inhibiting bicarbonate excretion  Continue BMP monitoring - urine chloride of 15  Nephrology following - no further IV fluids secondary to anasarca    Anasarca  Assessment & Plan  Likely progress secondary to Hydrocortisone use for brain metastases in the setting of underlying malignancy in general  Conservative management with compression stockings to legs and ACE wraps arms  Currently avoiding diuretic use due to severe electrolyte abnormalities    Thrombocytopenia   Assessment & Plan  Monitor platelet count - monitor for bleeding      DVT Prophylaxis: SCDs       Patient Centered Rounds:  I have performed bedside rounds and discussed plan of care with nursing today  Discussions with Specialists or Other Care Team Provider:  see above assessments if applicable    Education and Discussions with Family / Patient:  Patient at bedside, along with daughter, Kamryn Schwab, over the phone  Time Spent for Care:  32 minutes  More than 50% of total time spent on counseling and coordination of care as described above  Current Length of Stay: 2 day(s)    Current Patient Status: Inpatient   Certification Statement:  Patient will continue to require additional hospital stay due to assessments as noted above  Code Status: Level 3 - DNAR and DNI        Subjective:     Weakness/fatigue is waxing/waning  Still having bilateral lower extremity swelling, however, notes that swelling in his hands have improved  Objective:     Vitals:   Temp (24hrs), Av 2 °F (36 8 °C), Min:97 5 °F (36 4 °C), Max:98 7 °F (37 1 °C)    Temp:  [97 5 °F (36 4 °C)-98 7 °F (37 1 °C)] 98 7 °F (37 1 °C)  HR:  [56-94] 94  Resp:  [17-18] 18  BP: (126-159)/(62-96) 159/96  SpO2:  [92 %-98 %] 95 %  There is no height or weight on file to calculate BMI  Input and Output Summary (last 24 hours):        Intake/Output Summary (Last 24 hours) at 2021 1020  Last data filed at 2021 0829  Gross per 24 hour   Intake --   Output 1225 ml   Net -1225 ml       Physical Exam:     GENERAL:  Weak/fatigued  HEAD:  Normocephalic - atraumatic  EYES: PERRL - EOMI   MOUTH:  Mucosa moist  NECK:  Supple - full range of motion  CARDIAC:  Rate controlled   PULMONARY:  Nonlabored respirations at rest  ABDOMEN:  Soft - nontender/nondistended - active bowel sounds  MUSCULOSKELETAL:  Motor strength/range of motion deconditioned - distal LE pitting edema/anasarca  NEUROLOGIC:  Alert/oriented at baseline  SKIN:  Chronic wrinkles/blemishes   PSYCHIATRIC:  Mood/affect stable      Additional Data:     Labs & Recent Cultures:    Results from last 7 days   Lab Units 09/17/21  1548   WBC Thousand/uL 8 30   HEMOGLOBIN g/dL 13 3   HEMATOCRIT % 38 0   PLATELETS Thousands/uL 90*   NEUTROS PCT % 85*   LYMPHS PCT % 8*   MONOS PCT % 6   EOS PCT % 0     Results from last 7 days   Lab Units 09/19/21  0646 09/17/21  1548   POTASSIUM mmol/L 3 2* 1 5*   CHLORIDE mmol/L 92* 81*   CO2 mmol/L >45* >45*   BUN mg/dL 26* 21   CREATININE mg/dL 0 55* 0 81   CALCIUM mg/dL 7 7* 8 3   ALK PHOS U/L  --  89   ALT U/L  --  103*   AST U/L  --  77*                                 Last 24 Hours Medication List:   Current Facility-Administered Medications   Medication Dose Route Frequency Provider Last Rate    acetaminophen  650 mg Oral Q6H PRN Haley Galicia MD      aspirin  81 mg Oral Daily Arnulfo Richards MD      bisacodyl  10 mg Rectal Once Arnulfo Richards MD      finasteride  5 mg Oral Daily Arnulfo Richards MD      hydrocortisone  50 mg Oral TID Arnulfo Richards MD      levETIRAcetam  500 mg Oral Q12H [address] Arnulfo Richards MD      ondansetron  4 mg Intravenous Q4H PRN Haley Galicia MD      polyethylene glycol  17 g Oral BID Arnulfo Richards MD      potassium chloride  40 mEq Oral Q6H Haley Galicia MD      potassium chloride  40 mEq Oral Once Dewain Dines, PA-C      potassium-sodium phosphateS  2 tablet Oral 4x Daily (with meals and at bedtime) Arnulfo Richards MD                      ** Please Note: This note is constructed using a voice recognition dictation system  An occasional wrong word/phrase or sound-a-like substitution may have been picked up by dictation device due to the inherent limitations of voice recognition software    Read the chart carefully and recognize, using reasonable context, where substitutions may have occurred  **

## 2021-09-19 NOTE — ASSESSMENT & PLAN NOTE
In the setting of diuretic use (HCTZ) and hypochloremia inhibiting bicarbonate excretion  Continue BMP monitoring - urine chloride of 15  Nephrology following - no further IV fluids secondary to anasarca

## 2021-09-19 NOTE — PLAN OF CARE
Problem: PHYSICAL THERAPY ADULT  Goal: Performs mobility at highest level of function for planned discharge setting  See evaluation for individualized goals  Description: Treatment/Interventions: Functional transfer training, LE strengthening/ROM, Elevations, Therapeutic exercise, Endurance training, Patient/family training, Equipment eval/education, Bed mobility, Gait training, Spoke to nursing, OT  Equipment Recommended: Hassan Shone       See flowsheet documentation for full assessment, interventions and recommendations  Note: Prognosis: Good  Problem List: Decreased strength, Decreased endurance, Impaired balance, Decreased mobility, Impaired judgement, Decreased safety awareness  Assessment: Pt is 80 y o  male seen for PT evaluation s/p admit to [facility] on 9/17/2021  Two pt identifiers were used to confirm  Pt presented w/ abnormal outpt blood work  Patient's potassium was noted to be significantly low by cardiologist and pt presented to ED  Pt was admitted with a primary dx of:  Hypokalemia and other active problems including 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, hypophosphatemia, AFib, metabolic alkalosis, anasarca, thrombocytopenia  PT now consulted for assessment of mobility and d/c needs  Pt with Up with assistance orders  Pts current co morbidities affecting treatment include:  Abdominal aortic aneurysm, AFib, lung cancer, CAD, HLD, HTN, PAD, PVD, brain metastases  Pts current clinical presentation is Unstable/ Unpredictable (high complexity) due to Ongoing medical management for primary dx, Increased reliance on more restrictive AD compared to baseline, Decreased activity tolerance compared to baseline, Fall risk, Increased assistance needed from caregiver at current time, Continuous pulse oximetry monitoring     Upon evaluation, pt currently is requiring Min Ax1 for bed mobility; Min Ax1 for transfers and Min Ax1 for ambulation w/ RW    Pt presents at PT eval functioning below baseline and currently w/ overall mobility deficits 2* to: BLE weakness, impaired balance, decreased endurance, gait deviations, decreased activity tolerance compared to baseline, decreased safety awareness, fall risk  Pt currently at a fall risk 2* to impairments listed above  Based on the aforementioned PT evaluation, pt will continue to benefit from skilled Acute PT interventions to address stated impairments; to maximize functional mobility; for ongoing pt/ family training; and DME needs  At conclusion of PT session pt returned BTB with phone and call bell within reach  Pt denies any further questions at this time  PT is currently recommending Rehab  PT will continue to follow during hospital stay  Barriers to Discharge: Inaccessible home environment, Decreased caregiver support        PT Discharge Recommendation: Post acute rehabilitation services     PT - OK to Discharge: Yes (to rehab when medically cleared )    See flowsheet documentation for full assessment

## 2021-09-20 PROBLEM — R53.81 PHYSICAL DECONDITIONING: Status: ACTIVE | Noted: 2021-01-01

## 2021-09-20 NOTE — CASE MANAGEMENT
CM s/w pt at bedside to f/u regarding rehab recommendations  List of 21 choices provided at bedside filtered by zip code via Care port -- Pt states it is up to his son and dtr regarding whether he would d/c to [address]  "Whatever my son says " Pt agreed to allow this CM to contact family to review  For continuation of care, CM s/w daughter Geoffrey [facility] via phone to review above and inquire about plan for STR -- CM reviewed freedom of choice and stated that a list is left at pt's bedside  [facility] presents as apprehensive re: rehab, stating pt already receives rehab at home as well as cancer treatment every Tuesday  CM informed her that pt's cancer treatment would carry over to facility  [facility] states she will review with brother and call this CM back re: choices  Phone number to this CM provided ([phone])

## 2021-09-20 NOTE — PROGRESS NOTES
Pastoral Care Progress Note    2021  Patient: Kelsie Brannon : 1934  Admission Date & Time: 2021 1513  MRN: [id number] CSN: [id number]        Pt asked for communion and a blessing  Father Therese Hinesleela is responding

## 2021-09-20 NOTE — TREATMENT PLAN
Contacted by  - family patient only agreeable to eventual discharge back home w/ home health services - they are refusing skilled rehab

## 2021-09-20 NOTE — ASSESSMENT & PLAN NOTE
Possibly multifactorial secondary to poor oral intake coupled with chronic HCTZ use (now discontinued)  C/w aggressive intravenous/oral potassium supplementation - serum magnesium normal  Continue serial potassium monitoring - liberal diet  Appreciate nephrology input -> await renin/aldosterone testing - urine potassium of 46 8  Symptomatic complaints including muscle aches/joint swelling are waxing/waning

## 2021-09-20 NOTE — PROGRESS NOTES
Pastoral Care Progress Note    2021  Patient: Debora Saleh : 1934  Admission Date & Time: 2021 1513  MRN: [id number] [facility]: [id number]                     Chaplaincy Interventions Utilized:   Empowerment: Encouraged focus on present    Exploration: Explored emotional needs & resources    Relationship Building: Listened empathically and Provided silent and supportive presence      Chaplaincy Outcomes Achieved:  Catharsis, Distress reduced and Progressed toward focus on present      Spiritual Coping Strategies Utilized:   Spiritual comfort    Pt 'Israel Estrada' describes himself as 'depressed' and states that this is linked to current physical state  When asked about coping mechanisms he states 'I used to walk out the depression but now I can't walk ' Israel Estrada identified family (son and daughter) as support systems and states 'someone from my Faith, Meka Kellogg, is going to come to anoint me ' Israel Estrada denies need of hospital's on-call  at this time  If Israel Estrada needs further care from the [facility] office, please contact via [employer]  Thank you!

## 2021-09-20 NOTE — CASE MANAGEMENT
CM received return call from son Arina Causey states he would like pt to d/c back home with [facility] services with the support of himself and family members, rather than d/c to [facility]  Cj Senior prefers continuation of care with current providers and at-home PT/OT/VNA  Cj Senior states sister Eve Roland is also in agreement  CM inquired about agreeableness of STORMY with [facility] -- Francisco Javier confirmed  Referral sent back and accepted -- Fax # of [facility] is [phone]  Cj Senior states he will provide transportation home upon d/c

## 2021-09-20 NOTE — ASSESSMENT & PLAN NOTE
Multifactorial due to a constellation of medical issues  Discussed again with patient today -> states he is agreeable to consider skilled rehab as recommended by PT/OT - will touch base with case management

## 2021-09-20 NOTE — CASE MANAGEMENT
CM attempted to s/w dtr Sharyn Anderson ([phone]) and also son Nehemias Garcia ([phone]) to review STR recommendation and subsequent facility choices -- No answer on either of their phones  CM left brief VM requesting a call back  CM will continue to await return call to determine further d/c planning steps

## 2021-09-20 NOTE — ASSESSMENT & PLAN NOTE
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

## 2021-09-20 NOTE — PROGRESS NOTES
Progress Note - Nephrology   Marlin Mccabe 80 y o  male MRN: [id number]  Unit/Bed#: CW2 214-02 Encounter: [id number]      Assessment / Plan:  1  Severe hypokalemia-potassium 1 5 on admission, improved to 3 4 status post aggressive repletion  -suspect hypokalemia is in the setting HCTZ use as well as new onset diarrhea  -recommend control of diarrhea  -would continue potassium chloride 40 mEq p o  Q 6 hours  -would avoid further HCTZ use  -serum aldosterone and remain pending  -urine potassium to creatinine ratio suggestive of renal loss but now with concern for GI loss in setting of diarrhea  -steroids can cause a hypokalemic alkalosis-consider reducing or weaning off steroids if possible  -f/u am BMP/mag levels    2  Severe metabolic alkalosis-continue chloride replacement with potassium chloride replacement as above, avoid normal saline in the setting of 3rd spacing    3  Hypertension-blood pressure well controlled off HCTZ    4  Hypophosphatemia-resolved s/p repletion    5  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-has not been on chemotherapy or radiation, remains on steroids    6  Leg edema likely due to 3rd spacing in the setting of hypoalbuminemia from underlying malignancy and poor oral intake-continue holding diuretics, encourage nutritional supplement    7  Mild anemia - Hgb 11 9, montior CBC, avoid DWIGHT in light of malignancy      Subjective: The patient feels he is depressed  He has decreased appetite with diarrhea  Denies nausea, vomiting, chest pain or shortness of breath  Objective:     Vitals: Blood pressure 162/84, pulse 73, temperature (!) 97 3 °F (36 3 °C), resp  rate 18, SpO2 97 %  ,There is no height or weight on file to calculate BMI  Temp (24hrs), Av 8 °F (36 6 °C), Min:97 3 °F (36 3 °C), Max:98 1 °F (36 7 °C)      Weight (last 2 days)     None            Intake/Output Summary (Last 24 hours) at 2021 1322  Last data filed at 2021 0542  Gross per 24 hour Intake 1080 ml   Output 1425 ml   Net -345 ml     I/O last 24 hours: In: 1080 [P O :1080]  Out: 1625 [Urine:1625]        Physical Exam:   Physical Exam  Vitals reviewed  Constitutional:       General: He is not in acute distress  Appearance: He is well-developed  He is not diaphoretic  Comments: Thin, temporal wasting   HENT:      Head: Normocephalic and atraumatic  Nose: Nose normal       Mouth/Throat:      Mouth: Mucous membranes are dry  Pharynx: No oropharyngeal exudate  Eyes:      General: No scleral icterus  Right eye: No discharge  Left eye: No discharge  Neck:      Thyroid: No thyromegaly  Cardiovascular:      Rate and Rhythm: Normal rate and regular rhythm  Heart sounds: Normal heart sounds  Pulmonary:      Effort: Pulmonary effort is normal       Breath sounds: Normal breath sounds  No wheezing or rales  Abdominal:      General: Bowel sounds are normal  There is no distension  Palpations: Abdomen is soft  Tenderness: There is no abdominal tenderness  Musculoskeletal:         General: Swelling (b/l LE) present  Normal range of motion  Cervical back: Neck supple  Lymphadenopathy:      Cervical: No cervical adenopathy  Skin:     General: Skin is warm and dry  Findings: No rash  Neurological:      General: No focal deficit present  Mental Status: He is alert        Comments: awake   Psychiatric:      Comments: Flat affect         Invasive Devices     Central Venous Catheter Line            Port A Cath Left Chest -- days                Medications:    Scheduled Meds:  Current Facility-Administered Medications   Medication Dose Route Frequency Provider Last Rate    acetaminophen  650 mg Oral Q6H PRN Karina Morales MD      aspirin  81 mg Oral Daily Ether Lower, MD      bisacodyl  10 mg Rectal Once Ether Lower, MD      finasteride  5 mg Oral Daily Ether Lower, MD      hydrocortisone  50 mg Oral TID Ether MD Reggie  levETIRAcetam  500 mg Oral Q12H Bassem Hernandez MD      ondansetron  4 mg Intravenous Q4H PRN Peewee Umana MD      polyethylene glycol  17 g Oral BID Mell Rivera MD      potassium chloride  40 mEq Oral Q6H Peewee Umana MD      potassium-sodium phosphateS  2 tablet Oral 4x Daily (with meals and at bedtime) Mell Rivera MD         PRN Meds:   acetaminophen    ondansetron    Continuous Infusions:         LAB RESULTS:      Results from last 7 days   Lab Units 09/20/21  0626 09/19/21 2035 09/19/21  0650 09/19/21  0646 09/18/21 2219 09/18/21  0947 09/18/21  0007 09/17/21 2012 09/17/21  1548   WBC Thousand/uL 4 95  --   --   --   --   --   --   --  8 30   HEMOGLOBIN g/dL 11 9*  --   --   --   --   --   --   --  13 3   HEMATOCRIT % 34 7*  --   --   --   --   --   --   --  38 0   PLATELETS Thousands/uL 82*  --   --   --   --   --   --   --  90*   NEUTROS PCT % 77*  --   --   --   --   --   --   --  85*   LYMPHS PCT % 16  --   --   --   --   --   --   --  8*   MONOS PCT % 7  --   --   --   --   --   --   --  6   EOS PCT % 0  --   --   --   --   --   --   --  0   POTASSIUM mmol/L 3 4* 3 4*  --  3 2* 3 2* 2 5* 2 1* 2 5* 1 5*   CHLORIDE mmol/L 99*  --   --  92* 90* 84* 83* 83* 81*   CO2 mmol/L 40*  --   --  >45* >45* >45* >45* >45* >45*   BUN mg/dL 26*  --   --  26* 28* 25 24 26* 21   CREATININE mg/dL 0 51*  --   --  0 55* 0 63 0 77 0 72 1 04 0 81   CALCIUM mg/dL 7 8*  --   --  7 7* 7 5* 7 8* 7 6* 8 0* 8 3   ALK PHOS U/L  --   --   --   --   --   --   --   --  89   ALT U/L  --   --   --   --   --   --   --   --  103*   AST U/L  --   --   --   --   --   --   --   --  77*   MAGNESIUM mg/dL 2 0  --   --   --   --   --   --   --  3 3*   PHOSPHORUS mg/dL 3 4 3 1 2 7  --   --  2 0* 3 4 3 7 1 3*       CUTURES:  Lab Results   Component Value Date    URINECX 70,000-79,000 cfu/ml Pseudomonas aeruginosa (A) 08/16/2021    URINECX >100,000 cfu/ml Pseudomonas aeruginosa (A) 06/25/2021    URINECX 20,000-29,000 cfu/ml 06/15/2021    URINECX No Growth <1000 cfu/mL 05/03/2021                 Portions of the record may have been created with voice recognition software  Occasional wrong word or "sound a like" substitutions may have occurred due to the inherent limitations of voice recognition software  Read the chart carefully and recognize, using context, where substitutions have occurred  If you have any questions, please contact the dictating provider

## 2021-09-20 NOTE — PROGRESS NOTES
[facility]  Progress Note Mario Springer 1934, 80 y o  male MRN: [id number]  Unit/Bed#: CW2 214-02 Encounter: [id number]  Primary Care Provider: Inocencia Gutiérrez MD   Date and time admitted to hospital: 9/17/2021  3:13 PM      * Hypokalemia  Assessment & Plan  Possibly multifactorial secondary to poor oral intake coupled with chronic HCTZ use (now discontinued)  C/w aggressive intravenous/oral potassium supplementation - serum magnesium normal  Continue serial potassium monitoring - liberal diet  Appreciate nephrology input -> await renin/aldosterone testing - urine potassium of 46 8  Symptomatic complaints including muscle aches/joint swelling are waxing/waning    Physical deconditioning  Assessment & Plan  Multifactorial due to a constellation of medical issues  Discussed again with patient today -> states he is agreeable to consider skilled rehab as recommended by PT/OT - will touch base with case management    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   Assessment & Plan  Patient has requested no further oncologic workup or initiation of treatment  Developed gait instability last month and went to [facility], where 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 - due to his poor prognosis he has refused radiation/chemotherapy etc   C/w Keppra for seizure prophylaxis - c/w current Hydrocortisone regimen for edema  Reports improvement in gait abnormalities requested a wean off Hydrocortisone -> decreased to 50 mg q8h on admission, can be further tapered outpatient  Supportive care otherwise    Hypophosphatemia  Assessment & Plan  Serum phosphate currently normalized s/p repletion - continue to monitor    Atrial fibrillation  Assessment & Plan  Currently off all rate-controlling agents (previously on Toprol-XL) and anticoagulation    Metabolic alkalosis  Assessment & Plan  In the setting of diuretic use (HCTZ) and hypochloremia inhibiting bicarbonate excretion  Continue BMP monitoring - urine chloride of 15  Nephrology following - no further IV fluids secondary to anasarca    Anasarca  Assessment & Plan  Likely progress secondary to Hydrocortisone use for brain metastases in the setting of underlying malignancy in general  Conservative management with compression stockings to legs and ACE wraps arms  Currently avoiding diuretic use due to severe electrolyte abnormalities    Thrombocytopenia   Assessment & Plan  Monitor platelet count - monitor for bleeding      DVT Prophylaxis:  SCDs       Patient Centered Rounds:  I have performed bedside rounds and discussed plan of care with nursing today  Discussions with Specialists or Other Care Team Provider:  see above assessments if applicable    Education and Discussions with Family / Patient:  Patient at bedside - discussed with daughter, Geoffrey Nolasco, over the phone yesterday    Time Spent for Care:  32 minutes  More than 50% of total time spent on counseling and coordination of care as described above  Current Length of Stay: 3 day(s)    Current Patient Status: Inpatient   Certification Statement:  Patient will continue to require additional hospital stay due to assessments as noted above  Code Status: Level 3 - DNAR and DNI        Subjective:     Remains weak/fatigued  After further discussion, patient is now agreeable to consider skilled rehab, stating he understands the degree of his weakness  Objective:     Vitals:   Temp (24hrs), Av 8 °F (36 6 °C), Min:97 3 °F (36 3 °C), Max:98 1 °F (36 7 °C)    Temp:  [97 3 °F (36 3 °C)-98 1 °F (36 7 °C)] 97 3 °F (36 3 °C)  HR:  [60-78] 73  Resp:  [17-18] 18  BP: (154-171)/(83-93) 162/84  SpO2:  [94 %-97 %] 97 %  There is no height or weight on file to calculate BMI  Input and Output Summary (last 24 hours):        Intake/Output Summary (Last 24 hours) at 2021 1018  Last data filed at 2021 0542  Gross per 24 hour   Intake 1080 ml   Output 1425 ml   Net -345 ml Physical Exam:     GENERAL:  Weak/fatigued  HEAD:  Normocephalic - atraumatic  EYES: PERRL - EOMI   MOUTH:  Mucosa moist  NECK:  Supple - full range of motion  CARDIAC:  Rate controlled - S1/S2 positive  PULMONARY:  Fairly clear to auscultation - nonlabored respirations at rest  ABDOMEN:  Soft - nontender/nondistended - active bowel sounds  MUSCULOSKELETAL:  Motor strength/range of motion quite deconditioned - distal LE pitting edema/anasarca present  NEUROLOGIC:  At baseline  SKIN:  Chronic wrinkles/blemishes   PSYCHIATRIC:  Mood/affect stable      Additional Data:     Labs & Recent Cultures:    Results from last 7 days   Lab Units 09/20/21  0626   WBC Thousand/uL 4 95   HEMOGLOBIN g/dL 11 9*   HEMATOCRIT % 34 7*   PLATELETS Thousands/uL 82*   NEUTROS PCT % 77*   LYMPHS PCT % 16   MONOS PCT % 7   EOS PCT % 0     Results from last 7 days   Lab Units 09/20/21  0626 09/17/21  1548   POTASSIUM mmol/L 3 4* 1 5*   CHLORIDE mmol/L 99* 81*   CO2 mmol/L 40* >45*   BUN mg/dL 26* 21   CREATININE mg/dL 0 51* 0 81   CALCIUM mg/dL 7 8* 8 3   ALK PHOS U/L  --  89   ALT U/L  --  103*   AST U/L  --  77*                                 Last 24 Hours Medication List:   Current Facility-Administered Medications   Medication Dose Route Frequency Provider Last Rate    acetaminophen  650 mg Oral Q6H PRN Yahaira Fagan MD      aspirin  81 mg Oral Daily Frank Resendiz MD      bisacodyl  10 mg Rectal Once Frank Resendiz MD      finasteride  5 mg Oral Daily Frank Resendiz MD      hydrocortisone  50 mg Oral TID Frank Resendiz MD      levETIRAcetam  500 mg Oral Q12H [facility] Frank Resendiz MD      ondansetron  4 mg Intravenous Q4H PRN Yahaira Fagan MD      polyethylene glycol  17 g Oral BID Frank Resendiz MD      potassium chloride  40 mEq Oral Q6H Yahaira Fagan MD      potassium-sodium phosphateS  2 tablet Oral 4x Daily (with meals and at bedtime) Frank Resendiz MD                      ** Please Note: This note is constructed using a voice recognition dictation system  An occasional wrong word/phrase or sound-a-like substitution may have been picked up by dictation device due to the inherent limitations of voice recognition software  Read the chart carefully and recognize, using reasonable context, where substitutions may have occurred  **

## 2021-09-21 PROBLEM — E87.6 HYPOKALEMIA: Status: RESOLVED | Noted: 2021-01-01 | Resolved: 2021-01-01

## 2021-09-21 PROBLEM — E83.39 HYPOPHOSPHATEMIA: Status: RESOLVED | Noted: 2021-01-01 | Resolved: 2021-01-01

## 2021-09-21 NOTE — CASE MANAGEMENT
CM awaiting nephro clearance to determine pt's appropriateness for discharge; Pt remains onboard for [facility] w/ [facility] AT [facility] for at-home PT/OT/VNA, per request of any Carrera  CM updated them re: pt's potential d/c for today via [facility]  CM will fax F2F and AVS once available  CM t/c from any Carrera requesting update re: discharge time -- CM informed provider of request for medical updates for determination of p/u time

## 2021-09-21 NOTE — CASE MANAGEMENT
F2F and AVS sent to [facility] via [address]  Son to provide transportation home upon d/c  No further CM needs  CM will continue to follow to d/c

## 2021-09-21 NOTE — ASSESSMENT & PLAN NOTE
In the setting of diuretic use (HCTZ) and hypochloremia inhibiting bicarbonate excretion  Continue BMP monitoring - urine chloride of 15  Nephrology following - no further IV fluids secondary to anasarca    · Stable for discharge

## 2021-09-21 NOTE — DISCHARGE SUMMARY
[address] [facility]  Discharge- Lisbeth Nash 1934, 80 y o  male MRN: [id number]  Unit/Bed#: Romario Salgado 214-02 Encounter: [id number]  Primary Care Provider: Marie Ward MD   Date and time admitted to hospital: 9/17/2021  3:13 PM    * Hypokalemia  Assessment & Plan  Possibly multifactorial secondary to poor oral intake coupled with chronic HCTZ use (now discontinued)  C/w aggressive intravenous/oral potassium supplementation - serum magnesium normal  Continue serial potassium monitoring - liberal diet  Appreciate nephrology input -> await renin/aldosterone testing - urine potassium of 46 8  Symptomatic complaints including muscle aches/joint swelling are waxing/waning    · Patient's potassium repleted while inpatient  · Potassium today 4 0   · Discussed with Nephrology, permanent discontinuation of HCTZ  · Outpatient follow-up with primary care doctor    · Follow-up with BMP at end of week    Hypophosphatemia  Assessment & Plan  Serum phosphate currently normalized s/p repletion - continue to monitor  Phosphate today 3 4    Anasarca  Assessment & Plan  Likely progress secondary to Hydrocortisone use for brain metastases in the setting of underlying malignancy in general  Conservative management with compression stockings to legs and ACE wraps arms  Currently avoiding diuretic use due to severe electrolyte abnormalities    Physical deconditioning  Assessment & Plan  Multifactorial due to a constellation of medical issues  Discussed again with patient today -> states he is agreeable to consider skilled rehab as recommended by PT/OT - will touch base with case management    Patient did not want rehab at this time   He would rather be discharged with home health   Discussed with son Codi Booker who is agreeable     Metabolic alkalosis  Assessment & Plan  In the setting of diuretic use (HCTZ) and hypochloremia inhibiting bicarbonate excretion  Continue BMP monitoring - urine chloride of 13  Nephrology following - no further IV fluids secondary to anasarca    · Stable for discharge    Thrombocytopenia   Assessment & Plan  Monitor platelet count - monitor for bleeding  Stable for d/c       Medical Problems     Resolved Problems  Date Reviewed: 9/20/2021    None              Discharging Physician / Practitioner: Ada Maya PA-C  PCP: Branden Humphries MD  Admission Date:   Admission Orders (From admission, onward)     Ordered        09/17/21 1725  Inpatient Admission  Once                   Discharge Date: 09/21/21    Consultations During Hospital Stay:  · nephrology    Procedures Performed:   · None     Significant Findings / Test Results:   · Hypokalemia  · Hypophosphatemia  · Hypo magnesemia  · Elevated bicarbonate    Incidental Findings:   · None     Test Results Pending at Discharge (will require follow up): · None      Outpatient Tests Requested:  · Bmp     Complications:  None     Reason for Admission: hypokalemia     Hospital Course:   Megan Quiñonez is a 80 y o  male patient who originally presented to the hospital on 9/17/2021 due to hypokalemia  Patient should present to the emergency department for severe hypokalemia with associated body aches  He was aggressively repleted with IV, and oral potassium  He had multiple other electrolyte derangements including low phosphate, and magnesium which were also repleted  He was noted to have significant anasarca which was thought secondary to his steroid use for his malignant disease  Patient did have significant repletion of his potassium and is now stable for discharge at this time  He was refusing acute rehab instead would like to go home with home services  Discussed in detail with his son Ana Lilia Course regarding this was also in agreement states that he will follow up with his father at home  Patient is to have repeat BMP done at the end of the week according to Nephrology        Please see above list of diagnoses and related plan for additional information  Condition at Discharge: good    Discharge Day Visit / Exam:   Subjective:  Patient is concerned that he is still having swelling but states that is not gotten any worse since he has been here  He states that his other symptoms improved he still having some slight diarrhea  States that he would like to go home with home services instead of going to rehab  Vitals: Blood Pressure: 138/87 (09/21/21 0850)  Pulse: 71 (09/21/21 0120)  Temperature: (!) 97 3 °F (36 3 °C) (09/21/21 0120)  Temp Source: Oral (09/21/21 0850)  Respirations: 12 (09/21/21 0120)  SpO2: 95 % (09/21/21 0120)  Exam:   Physical Exam  Constitutional:       Appearance: He is not ill-appearing or diaphoretic  HENT:      Head: Normocephalic and atraumatic  Mouth/Throat:      Mouth: Mucous membranes are moist       Pharynx: Oropharynx is clear  Eyes:      Extraocular Movements: Extraocular movements intact  Pupils: Pupils are equal, round, and reactive to light  Cardiovascular:      Rate and Rhythm: Normal rate and regular rhythm  Pulses: Normal pulses  Heart sounds: Normal heart sounds  No murmur heard  No gallop  Pulmonary:      Effort: Pulmonary effort is normal  No respiratory distress  Breath sounds: Normal breath sounds  No wheezing  Abdominal:      General: Abdomen is flat  Bowel sounds are normal       Palpations: Abdomen is soft  Tenderness: There is no abdominal tenderness  Musculoskeletal:         General: Swelling present  Cervical back: Normal range of motion and neck supple  Right lower leg: Edema present  Left lower leg: Edema present  Comments: Swelling of left hand, and lower extremities up approximately mid calf  Skin:     Capillary Refill: Capillary refill takes less than 2 seconds  Neurological:      General: No focal deficit present  Mental Status: He is alert and oriented to person, place, and time     Psychiatric:         Mood and Affect: Mood normal           Discussion with Family: Updated  (son) via phone  Discharge instructions/Information to patient and family:   See after visit summary for information provided to patient and family  Provisions for Follow-Up Care:  See after visit summary for information related to follow-up care and any pertinent home health orders  Disposition:   Home    Planned Readmission: none      Discharge Statement:  I spent 45 minutes discharging the patient  This time was spent on the day of discharge  I had direct contact with the patient on the day of discharge  Greater than 50% of the total time was spent examining patient, answering all patient questions, arranging and discussing plan of care with patient as well as directly providing post-discharge instructions  Additional time then spent on discharge activities  Discharge Medications:  See after visit summary for reconciled discharge medications provided to patient and/or family        **Please Note: This note may have been constructed using a voice recognition system**

## 2021-09-21 NOTE — ASSESSMENT & PLAN NOTE
Multifactorial due to a constellation of medical issues  Discussed again with patient today -> states he is agreeable to consider skilled rehab as recommended by PT/OT - will touch base with case management    Patient did not want rehab at this time   He would rather be discharged with home health   Discussed with any freedman who is agreeable

## 2021-09-21 NOTE — PLAN OF CARE
Problem: Nutrition/Hydration-ADULT  Goal: Nutrient/Hydration intake appropriate for improving, restoring or maintaining nutritional needs  Description: Monitor and assess patient's nutrition/hydration status for malnutrition  Collaborate with interdisciplinary team and initiate plan and interventions as ordered  Monitor patient's weight and dietary intake as ordered or per policy  Utilize nutrition screening tool and intervene as necessary  Determine patient's food preferences and provide high-protein, high-caloric foods as appropriate       INTERVENTIONS:  - Monitor oral intake, urinary output, labs, and treatment plans  - Assess nutrition and hydration status and recommend course of action  - Evaluate amount of meals eaten  - Assist patient with eating if necessary   - Allow adequate time for meals  - Recommend/ encourage appropriate diets, oral nutritional supplements, and vitamin/mineral supplements  - Order, calculate, and assess calorie counts as needed  - Recommend, monitor, and adjust tube feedings and TPN/PPN based on assessed needs  - Assess need for intravenous fluids  - Provide specific nutrition/hydration education as appropriate  - Include patient/family/caregiver in decisions related to nutrition  Outcome: Progressing     Problem: MOBILITY - ADULT  Goal: Maintain or return to baseline ADL function  Description: INTERVENTIONS:  -  Assess patient's ability to carry out ADLs; assess patient's baseline for ADL function and identify physical deficits which impact ability to perform ADLs (bathing, care of mouth/teeth, toileting, grooming, dressing, etc )  - Assess/evaluate cause of self-care deficits   - Assess range of motion  - Assess patient's mobility; develop plan if impaired  - Assess patient's need for assistive devices and provide as appropriate  - Encourage maximum independence but intervene and supervise when necessary  - Involve family in performance of ADLs  - Assess for home care needs following discharge   - Consider OT consult to assist with ADL evaluation and planning for discharge  - Provide patient education as appropriate  Outcome: Progressing  Goal: Maintains/Returns to pre admission functional level  Description: INTERVENTIONS:  - Perform BMAT or MOVE assessment daily    - Set and communicate daily mobility goal to care team and patient/family/caregiver  - Collaborate with rehabilitation services on mobility goals if consulted  - Perform Range of Motion  times a day  - Reposition patient every  hours    - Dangle patient  times a day  - Stand patient  times a day  - Ambulate patient  times a day  - Out of bed to chair  times a day   - Out of bed for meals  times a day  - Out of bed for toileting  - Record patient progress and toleration of activity level   Outcome: Progressing

## 2021-09-21 NOTE — ASSESSMENT & PLAN NOTE
Possibly multifactorial secondary to poor oral intake coupled with chronic HCTZ use (now discontinued)  C/w aggressive intravenous/oral potassium supplementation - serum magnesium normal  Continue serial potassium monitoring - liberal diet  Appreciate nephrology input -> await renin/aldosterone testing - urine potassium of 46 8  Symptomatic complaints including muscle aches/joint swelling are waxing/waning    · Patient's potassium repleted while inpatient  · Potassium today 4 0   · Discussed with Nephrology, permanent discontinuation of HCTZ  · Outpatient follow-up with primary care doctor    · Follow-up with BMP at end of week

## 2021-09-21 NOTE — PLAN OF CARE
Problem: OCCUPATIONAL THERAPY ADULT  Goal: Performs self-care activities at highest level of function for planned discharge setting  See evaluation for individualized goals  Description: Treatment Interventions: ADL retraining, Functional transfer training, Endurance training, Cognitive reorientation, Patient/family training, Equipment evaluation/education, Compensatory technique education, Energy conservation, Activityengagement          See flowsheet documentation for full assessment, interventions and recommendations  Note: Limitation: Decreased ADL status, Decreased UE strength, Decreased Safe judgement during ADL, Decreased cognition, Decreased endurance, Decreased self-care trans, Decreased high-level ADLs  Prognosis: Fair  Assessment: Pt seen for skilled OT treatment this AM w focus on self care, functional mobility, ECT/self pacing skills  Pt noted with LUE edema/swelling as well as LLE swelling  Pt instructed in UE exercises and was able to do such self once instructed  Pt able to walk to bathroom w min assist and use of RW  Able to stand at sinkside while brushing teeth,washing face and hands, S needed  No loss of balance noted  Pt did need mod assist for LB dressing, having difficulty threading both legs into sleeves of pants  Also assist needed for donning socks  Pt educated on simple ECT/self pacing skills w fair understanding  The patient's raw score on the AM-PAC Daily Activity inpatient short form is 18, standardized score is 38 66, less than 39 4  Patients at this level are likely to benefit from discharge to post-acute rehabilitation services  From OT standpoint, continue to recommend STR at time of DC, however, based on chart review, pt and his family is refusing rehab and wants home  Should pt end of going home, it is recommended that he has 24/7 assist at home  Pt reports having bedroom up on second floor, may be able to sleep on his couch (sunken living room)   Has 1/2 bathroom on main floor  OT will continue to follow while in acute care        OT Discharge Recommendation: Post acute rehabilitation services

## 2021-09-21 NOTE — PROGRESS NOTES
Patient refused evening medications:  K+Na- Phos tablets, Mucinex and hydrocortisone (Corteff) tab  He did take Keppra  I was told in report that he was starting to refuse some medications  I notified Dago Jaime from [facility]  Patient is resting, vitals are stable with 97% 02 room air      I will continue to monitor

## 2021-09-21 NOTE — PHYSICAL THERAPY NOTE
PT TREATMENT       09/21/21 0915   PT Last Visit   PT Visit Date 09/21/21   Note Type   Note Type Treatment   Pain Assessment   Pain Assessment Tool 0-10   Pain Score No Pain   Restrictions/Precautions   Other Precautions Fall Risk; Chair Alarm; Bed Alarm;Cognitive  (CHAIR ALARM ACTIVE POST PT TREAT)   General   Chart Reviewed Yes   Response to Previous Treatment Patient with no complaints from previous session  Family/Caregiver Present No   Cognition   Arousal/Participation Alert; Cooperative   Attention Attends with cues to redirect   Orientation Level Oriented to person;Oriented to place;Oriented to situation   Following Commands Follows one step commands with increased time or repetition   Comments REDUCED INSTRUCTION FOLLOWING- IE  THERAPSIT EDUCATED AND DEMONSTRATED STAIR TRAINING AND PATIENT DID NOT PERFORM IN THIS MANNER   Subjective   Subjective "TALK WITH MY FAMILY, I WANT TO DO WHAT THEY WANT ME TO DO"   Transfers   Sit to Stand 5  Supervision   Additional items Increased time required;Verbal cues  (+POSTERIOR LEAN- CHAIR MOVED BACKWARDS )   Stand to Sit 5  Supervision   Additional items Verbal cues   Additional Comments VC TO ENCOURAGE PUSHING FROM CHAIR SIT-->STAND AND REACHING BACK FOR CHAIR STAND -->SIT; PATIENT DEMONSTRATED REDUCED CARRYOVER    Ambulation/Elevation   Gait pattern Excessively slow;Decreased foot clearance   Gait Assistance 5  Supervision  (INITIALLY CG- PROGRESSING TO S LEVEL)   Additional items Assist x 1   Assistive Device Rolling walker   Distance 10 FEET + 5 FEET (SEATED REST BREAK) + 50 FEET    Stair Management Assistance 3  Moderate assist   Additional items Assist x 1;Verbal cues; Tactile cues  (VC: INCREASE LE CLEARANCE, WALK CLOSER TO RW)   Stair Management Technique Two rails; Foreward;Nonreciprocal   Number of Stairs 3   Balance   Static Sitting Good   Static Standing Fair -   Ambulatory Poor +   Endurance Deficit   Endurance Deficit Yes   Endurance Deficit Description LE WEAKNESS, R LE AND UE EDEMA    Activity Tolerance   Activity Tolerance Patient tolerated treatment well;Patient limited by fatigue   Nurse Made Aware FADI TO SEE PER RN CARRIE    Assessment   Prognosis Good   Problem List Decreased strength;Decreased endurance; Impaired balance;Decreased mobility; Decreased cognition; Impaired judgement;Decreased safety awareness   Assessment PT INITIATED TREATMENT SESSION IN ORDER TO ASSIST PATIENT IN ACHIEVING GOALS TO IMPROVE AMBULATION, TRANSFERS, AND OVERALL ACTIVITY TOLERANCE  TODAY PATIENT DEMONSTRATED ABILITY TO PERFORM SIT<-->STAND TRANSFERS AND AMBULATION W/ RW WITH CLOSE SUPERVISION AND VC FOR SAFETY  PATIENT AMBULATED A COMBINE DISTANCE OF 65 FEET WITH STANDING AND SEATED REST BREAKS  TRANSPORTED TO STEPS IN WHEELCHAIR WHERE THERAPIST DEMONSTRATED NON-RECIPROCAL PATTERN OF STEPS WITH USE OF R HR  PATIENT REQUIRED MOD-AX1 TO ASCEND/DESCEND 3 STEPS USING B/L HR  STAIRS WERE CHALLENGING FOR PATIENT AND HE DENIES HAVING HR ON HIS 2-3 BELINDA  PATIENT EXPRESSING DESIRE TO GO HOME AND PER CHART REVIEW, FAMILY IS REFUSING SHORT TERM REHAB  THERAPIST D/C RECOMMENDATION REMAINS FOR REHAB AS THIS PATIENT NORMALLY RESIDES ALONE IN A 2 STORY HOME  IF D/C HOME, PATIENT WOULD BENEFIT FROM 24 HOUR S/ASSIST, USE OF RW, 1ST FLOOR SET UP, AND HHPT  PATIENT WILL BENEFIT FROM CONTINUED SKILLED PT THIS ADMISSION TO ACHIEVE MAXIMAL FUNCTION AND SAFETY  Goals   Patient Goals TO GO HOME    STG Expiration Date 09/29/21   PT Treatment Day 1   Plan   Treatment/Interventions Functional transfer training;LE strengthening/ROM; Elevations; Therapeutic exercise; Endurance training;Patient/family training;Equipment eval/education; Bed mobility;Gait training;OT;Spoke to nursing   Progress Progressing toward goals   PT Frequency Other (Comment)  (3-5X/WK)   Recommendation   PT Discharge Recommendation Post acute rehabilitation services  (PATIENT/FAMILY REFUSING- PLANNING ON D/C HOME; SEE ASSESSMEN)   Equipment Recommended Walker  (HAS RW AT HOME)   PT - OK to Discharge Yes  (TO REHAB WHEN MED FADI )   [address] Mobility Inpatient   Turning in Bed Without Bedrails 3   Lying on Back to Sitting on Edge of Flat Bed 3   Moving Bed to Chair 3   Standing Up From Chair 3   Walk in Room 3   Climb 3-5 Stairs 2   Basic Mobility Inpatient Raw Score 17   Basic Mobility Standardized Score 39 67     The patient's AM-PAC Basic Mobility Inpatient Standardized Score is less than 42 9, suggesting this patient may benefit from discharge to post-acute rehabilitation services  Please also refer to the recommendation of the Physical Therapist for safe discharge planning      CHIOMA CATHERINE PT, DPT

## 2021-09-21 NOTE — PROGRESS NOTES
Progress Note - Nephrology   Conyeduardo Salinas 80 y o  male MRN: [id number]  Unit/Bed#: CW2 214-02 Encounter: [id number]      Assessment / Plan:  1  Severe hypokalemia-potassium 1 5 on admission, improved to 4 status post aggressive repletion  -suspect hypokalemia is in the setting HCTZ use as well as new onset diarrhea  -recommend d/c HCTZ indefinitely  -monitor off further K supplementation as diarrhea has resolved  -serum aldosterone and renin pending  -steroids can cause a hypokalemic alkalosis-consider reducing or weaning off steroids if possible  -f/u am BMP/mag levels    2  Severe metabolic alkalosis-continue chloride replacement with potassium chloride replacement as above, avoid normal saline in the setting of 3rd spacing, improving    3  Hypertension-blood pressure well controlled off HCTZ    4  Hypophosphatemia-resolved s/p repletion    5  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-has not been on chemotherapy or radiation, remains on steroids    6  Leg edema likely due to 3rd spacing in the setting of hypoalbuminemia from underlying malignancy and poor oral intake-continue holding diuretics, encourage nutritional supplement    7  Mild anemia - resolved, avoid DWIGHT in light of malignancy    Ok for d/c from renal perspective  Needs outpatient BMP and follow up with PCP  If K remains low, will need nephrology follow up as well  Continue holding HCTZ  Have d/w SLIM attending  Subjective: The patient denies any chest pain, shortness of breath, vomiting or diarrhea  No complaints  Objective:     Vitals: Blood pressure 138/87, pulse 71, temperature (!) 97 3 °F (36 3 °C), resp  rate 12, SpO2 95 %  ,There is no height or weight on file to calculate BMI  Temp (24hrs), Av 6 °F (36 4 °C), Min:97 3 °F (36 3 °C), Max:97 7 °F (36 5 °C)      Weight (last 2 days)     None            Intake/Output Summary (Last 24 hours) at 2021 1313  Last data filed at 2021 0850  Gross per 24 hour   Intake 440 ml   Output 450 ml   Net -10 ml     I/O last 24 hours: In: 12 [P O :560]  Out: 450 [Urine:450]        Physical Exam:   Physical Exam  Vitals reviewed  Constitutional:       General: He is not in acute distress  Appearance: He is well-developed  He is not diaphoretic  Comments: Thin, sitting up in chair   HENT:      Head: Normocephalic and atraumatic  Nose: Nose normal       Mouth/Throat:      Mouth: Mucous membranes are moist       Pharynx: No oropharyngeal exudate  Eyes:      General: No scleral icterus  Right eye: No discharge  Left eye: No discharge  Neck:      Thyroid: No thyromegaly  Cardiovascular:      Rate and Rhythm: Normal rate and regular rhythm  Heart sounds: Normal heart sounds  Pulmonary:      Effort: Pulmonary effort is normal       Breath sounds: Normal breath sounds  No wheezing or rales  Abdominal:      General: Bowel sounds are normal  There is no distension  Palpations: Abdomen is soft  Tenderness: There is no abdominal tenderness  Musculoskeletal:         General: Swelling (b/l LE edema, LUE edema) present  Cervical back: Neck supple  Lymphadenopathy:      Cervical: No cervical adenopathy  Skin:     General: Skin is warm and dry  Findings: No rash  Neurological:      General: No focal deficit present  Mental Status: He is alert        Comments: awake   Psychiatric:         Mood and Affect: Mood normal          Behavior: Behavior normal          Invasive Devices     Central Venous Catheter Line            Port A Cath Left Chest -- days                Medications:    Scheduled Meds:  Current Facility-Administered Medications   Medication Dose Route Frequency Provider Last Rate    acetaminophen  650 mg Oral Q6H PRN Peewee Umana MD      aspirin  81 mg Oral Daily Mell Rivera MD      bisacodyl  10 mg Rectal Once Mell Rivera MD      finasteride  5 mg Oral Daily Mell Rivera MD      hydrocortisone  50 mg Oral TID Chano Randolph MD      levETIRAcetam  500 mg Oral Q12H [address] Chano Randolph MD      ondansetron  4 mg Intravenous Q4H PRN Linda Enciso MD      polyethylene glycol  17 g Oral BID Chano Randolph MD      potassium-sodium phosphateS  2 tablet Oral 4x Daily (with meals and at bedtime) Chano Randolph MD         PRN Meds:   acetaminophen    ondansetron    Continuous Infusions:         LAB RESULTS:      Results from last 7 days   Lab Units 09/21/21  0553 09/20/21  0626 09/19/21  2035 09/19/21  0650 09/19/21  0646 09/18/21  2219 09/18/21  0947 09/18/21  0007 09/17/21 2012 09/17/21  1548   WBC Thousand/uL 4 38 4 95  --   --   --   --   --   --   --  8 30   HEMOGLOBIN g/dL 12 7 11 9*  --   --   --   --   --   --   --  13 3   HEMATOCRIT % 38 0 34 7*  --   --   --   --   --   --   --  38 0   PLATELETS Thousands/uL 92* 82*  --   --   --   --   --   --   --  90*   NEUTROS PCT % 62 77*  --   --   --   --   --   --   --  85*   LYMPHS PCT % 29 16  --   --   --   --   --   --   --  8*   MONOS PCT % 7 7  --   --   --   --   --   --   --  6   EOS PCT % 1 0  --   --   --   --   --   --   --  0   POTASSIUM mmol/L 4 0 3 4* 3 4*  --  3 2* 3 2* 2 5* 2 1* 2 5* 1 5*   CHLORIDE mmol/L 103 99*  --   --  92* 90* 84* 83* 83* 81*   CO2 mmol/L 35* 40*  --   --  >45* >45* >45* >45* >45* >45*   BUN mg/dL 23 26*  --   --  26* 28* 25 24 26* 21   CREATININE mg/dL 0 56* 0 51*  --   --  0 55* 0 63 0 77 0 72 1 04 0 81   CALCIUM mg/dL 8 0* 7 8*  --   --  7 7* 7 5* 7 8* 7 6* 8 0* 8 3   ALK PHOS U/L  --   --   --   --   --   --   --   --   --  89   ALT U/L  --   --   --   --   --   --   --   --   --  103*   AST U/L  --   --   --   --   --   --   --   --   --  77*   MAGNESIUM mg/dL 2 0 2 0  --   --   --   --   --   --   --  3 3*   PHOSPHORUS mg/dL 3 4 3 4 3 1 2 7  --   --  2 0* 3 4 3 7 1 3*       CUTURES:  Lab Results   Component Value Date    URINECX 70,000-79,000 cfu/ml Pseudomonas aeruginosa (A) 08/16/2021    URINECX >100,000 cfu/ml Pseudomonas aeruginosa (A) 06/25/2021    URINECX 20,000-29,000 cfu/ml  06/15/2021    URINECX No Growth <1000 cfu/mL 05/03/2021                 Portions of the record may have been created with voice recognition software  Occasional wrong word or "sound a like" substitutions may have occurred due to the inherent limitations of voice recognition software  Read the chart carefully and recognize, using context, where substitutions have occurred  If you have any questions, please contact the dictating provider

## 2021-09-21 NOTE — PLAN OF CARE
Problem: PHYSICAL THERAPY ADULT  Goal: Performs mobility at highest level of function for planned discharge setting  See evaluation for individualized goals  Description: Treatment/Interventions: Functional transfer training, LE strengthening/ROM, Elevations, Therapeutic exercise, Endurance training, Patient/family training, Equipment eval/education, Bed mobility, Gait training, Spoke to nursing, OT  Equipment Recommended: Meagan Lopez       See flowsheet documentation for full assessment, interventions and recommendations  Outcome: Progressing  Note: Prognosis: Good  Problem List: Decreased strength, Decreased endurance, Impaired balance, Decreased mobility, Decreased cognition, Impaired judgement, Decreased safety awareness  Assessment: PT INITIATED TREATMENT SESSION IN ORDER TO ASSIST PATIENT IN ACHIEVING GOALS TO IMPROVE AMBULATION, TRANSFERS, AND OVERALL ACTIVITY TOLERANCE  TODAY PATIENT DEMONSTRATED ABILITY TO PERFORM SIT<-->STAND TRANSFERS AND AMBULATION W/ RW WITH CLOSE SUPERVISION AND VC FOR SAFETY  PATIENT AMBULATED A COMBINE DISTANCE OF 65 FEET WITH STANDING AND SEATED REST BREAKS  TRANSPORTED TO STEPS IN WHEELCHAIR WHERE THERAPIST DEMONSTRATED NON-RECIPROCAL PATTERN OF STEPS WITH USE OF R HR  PATIENT REQUIRED MOD-AX1 TO ASCEND/DESCEND 3 STEPS USING B/L HR  STAIRS WERE CHALLENGING FOR PATIENT AND HE DENIES HAVING HR ON HIS 2-3 BELINDA  PATIENT EXPRESSING DESIRE TO GO HOME AND PER CHART REVIEW, FAMILY IS REFUSING SHORT TERM REHAB  THERAPIST D/C RECOMMENDATION REMAINS FOR REHAB AS THIS PATIENT NORMALLY RESIDES ALONE IN A 2 STORY HOME  IF D/C HOME, PATIENT WOULD BENEFIT FROM 24 HOUR S/ASSIST, USE OF RW, 1ST FLOOR SET UP, AND HHPT  PATIENT WILL BENEFIT FROM CONTINUED SKILLED PT THIS ADMISSION TO ACHIEVE MAXIMAL FUNCTION AND SAFETY    Barriers to Discharge: Inaccessible home environment, Decreased caregiver support        PT Discharge Recommendation: Post acute rehabilitation services (PATIENT/FAMILY REFUSING- PLANNING ON D/C HOME; SEE ASSESSMEN)     PT - OK to Discharge: Yes (TO REHAB WHEN MED FADI )    See flowsheet documentation for full assessment

## 2021-09-21 NOTE — OCCUPATIONAL THERAPY NOTE
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     Atrial fibrillation    Thrombocytopenia     Anasarca    Metabolic alkalosis    Physical deconditioning      Past Medical History:   Diagnosis Date    AAA (abdominal aortic aneurysm) (HCC)     [facility]     Cancer ([facility] ) 2014    lung ca- right lung  stage 3     Carotid artery disease ([facility] )     Ear problems     Hyperlipidemia     Hypertension     PAD (peripheral artery disease) (HCC)     Pneumonia     PVD (peripheral vascular disease) ([facility] [facility] )        Past Surgical History:   Procedure Laterality Date    BOWEL RESECTION      COLONOSCOPY      HERNIA REPAIR      X2    IR AORTAGRAM WITH RUN-OFF  5/29/2020    SEVERAL    IR AORTAGRAM WITH RUN-OFF  10/9/2020    IR LOWER EXTREMITY ANGIOGRAM  11/4/2020    IR LOWER EXTREMITY ANGIOGRAM  5/27/2021    TN CYSTOURETHROSCOPY W/IRRIG & EVAC CLOTS N/A 4/16/2021    Procedure: CYSTOSCOPY EVACUATION OF [place] Cellar;  Surgeon: Jenna Ga MD;  [facility];  Service: Urology    TN CYSTOURETHROSCOPY,FULGUR 2-5 CM LESN N/A 1/15/2021    Procedure: CYSTOSCOPY, TRANSURETHRAL RESECTION OF BLADDER TUMOR (TURBT);   Surgeon: Emiliano Castaneda MD;  Location: [address] [place] [address];  Service: Urology    TN [facility] [facility] 10/9/2020    Procedure: ARTERIOGRAM Left lower extremity agram right femoral access with SFA stenting/angioplasty;  Surgeon: Bernard Simmons MD;  [facility];  Service: Vascular    TN [employer] 3RD+ ORD SLCTV ABDL [facility] Right 11/4/2020    Procedure: ARTERIOGRAM RLE angiogram L fem access;  Surgeon: Maico Buckner MD;  [facility];  Service: Vascular    TN TRANSURETHRAL ELEC-SURG PROSTATECTOM N/A 3/2/2021    Procedure: CYSTOSCOPY, TRANSURETHRAL RESECTION OF Alfredo Cape;  Surgeon: Emiliano Castaneda MD;  Location: [address] [place] [address];  Service: Urology    PROSTATE SURGERY      REMOVAL VENOUS PORT (PORT-A-CATH)          09/21/21 0918   OT Last Visit   OT Visit Date 09/21/21   Note Type   Note Type Treatment   Restrictions/Precautions   Weight Bearing Precautions Per Order No   Other Precautions Fall Risk; Chair Alarm;Cognitive; Bed Alarm   General   Response to Previous Treatment Patient reporting fatigue but able to participate   Lifestyle   Autonomy I adls and mobility - family assists with iadls    Reciprocal Relationships supportive family - reports son and dtr check on him regularly    Service to Others retired    Intrinsic Gratification sedentary    Pain Assessment   Pain Assessment Tool 0-10   Pain Score No Pain   ADL   Eating Assistance 7  Independent   Eating Deficit Setup   Grooming Assistance 5  Supervision/Setup   Grooming Deficit Setup; Wash/dry hands; Wash/dry face;Brushing hair;Teeth care   LB Dressing Assistance 3  Moderate Assistance   LB Dressing Deficit Thread RLE into pants; Thread LLE into pants;Pull up over hips;Don/doff R sock; Don/doff L sock   Functional Standing Tolerance   Time 5 min   Activity standing at sinkside for grooming   Bed Mobility   Additional Comments pt OOB in chair upon arrival   Transfers   Sit to Stand 5  Supervision   Additional items Increased time required;Verbal cues   Stand to Sit 5  Supervision   Additional items Increased time required;Verbal cues   Additional Comments verbal cues for hand placement/safety    Functional Mobility   Functional Mobility 4  Minimal assistance   Additional Comments walk to bathroom   Additional items Rolling walker   Toilet Transfers   Toilet Transfer From Rolling walker   Cognition   Arousal/Participation Alert; Cooperative   Attention Attends with cues to redirect   Orientation Level Oriented to person;Oriented to place;Oriented to situation   Memory Decreased recall of precautions;Decreased recall of recent events   Following Commands Follows one step commands with increased time or repetition   Comments pt cooperative  slightly [place]  Needs increased time to follwo instructions   Activity Tolerance   Activity Tolerance Patient limited by fatigue   Medical Staff Made Aware OK to see per RN   Assessment   Assessment Pt seen for skilled OT treatment this AM w focus on self care, functional mobility, ECT/self pacing skills  Pt noted with LUE edema/swelling as well as LLE swelling  Pt instructed in UE exercises and was able to do such self once instructed  Pt able to walk to bathroom w min assist and use of RW  Able to stand at sinkside while brushing teeth,washing face and hands, S needed  No loss of balance noted  Pt did need mod assist for LB dressing, having difficulty threading both legs into sleeves of pants  Also assist needed for donning socks  Pt educated on simple ECT/self pacing skills w fair understanding  The patient's raw score on the AM-PAC Daily Activity inpatient short form is 18, standardized score is 38 66, less than 39 4  Patients at this level are likely to benefit from discharge to post-acute rehabilitation services  From OT standpoint, continue to recommend STR at time of DC, however, based on chart review, pt and his family is refusing rehab and wants home  Should pt end of going home, it is recommended that he has 24/7 assist at home  Pt reports having bedroom up on second floor, may be able to sleep on his couch (sunken living room)  Has 1/2 bathroom on main floor  OT will continue to follow while in acute care  Plan   Treatment Interventions ADL retraining;Functional transfer training;UE strengthening/ROM; Cognitive reorientation; Endurance training;Patient/family training; Compensatory technique education; Energy conservation; Activityengagement   Goal Expiration Date 10/03/21   OT Treatment Day 1   OT Frequency 3-5x/wk   Recommendation   OT Discharge Recommendation Post acute rehabilitation services   AM-[facility] Daily Activity Inpatient   Lower Body Dressing 2   Bathing 2 Toileting 3   Upper Body Dressing 3   Grooming 4   Eating 4   Daily Activity Raw Score 18   Daily Activity Standardized Score (Calc for Raw Score >=11) 38 65

## 2021-09-23 PROBLEM — N39.0 URINARY TRACT INFECTION: Status: ACTIVE | Noted: 2021-01-01

## 2021-09-23 PROBLEM — I89.0 LYMPHEDEMA OF BOTH LOWER EXTREMITIES: Chronic | Status: ACTIVE | Noted: 2021-01-01

## 2021-09-23 PROBLEM — N17.0 ACUTE KIDNEY INJURY (AKI) WITH ACUTE TUBULAR NECROSIS (ATN) (HCC): Status: ACTIVE | Noted: 2021-01-01

## 2021-09-23 PROBLEM — C34.90 LUNG CANCER, PRIMARY, WITH METASTASIS FROM LUNG TO OTHER SITE (HCC): Status: RESOLVED | Noted: 2020-05-18 | Resolved: 2021-01-01

## 2021-09-23 PROBLEM — T14.91XA SUICIDE ATTEMPT (HCC): Status: ACTIVE | Noted: 2021-01-01

## 2021-09-23 PROBLEM — N39.0 URINARY TRACT INFECTION: Status: RESOLVED | Noted: 2021-01-01 | Resolved: 2021-01-01

## 2021-09-23 NOTE — ED NOTES
Patient's son at bedside  He states the patient has been struggling with depression for some time secondary to cancer diagnosis  He states that they had a typical day today, took a walk in the park, and ate dinner together  Son reports that the patient complained of an upset stomach around 2100, was given maalox and then went to bed early  He checked on the patient around midnight and he was sleeping soundly  Around 0100, the son woke up to the patient yelling for help from his room  Son went inside and found the patient sitting in the shower in a pool of blood, with lacerations to his bilateral wrists and bilateral feet  EMS was called immediately  The son is very concerned and shocked because this behavior is not typical of the patient, although he has been depressed he has never acted out like this before  When the son approached the patient, patient stated, "I'm so sorry, I just lost it " Patient is resting comfortably at this time, no visible distress  Son is going back home at this time        Charu Khalil, MIGEL  09/23/21 4200

## 2021-09-23 NOTE — PROGRESS NOTES
Vancomycin Assessment    Cherelle Damon is a 80 y o  male who is currently receiving vancomycin 1g IV q12 hours for skin-soft tissue infection     Relevant clinical data and objective history reviewed:  Creatinine   Date Value Ref Range Status   09/23/2021 1 02 0 60 - 1 30 mg/dL Final     Comment:     Standardized to IDMS reference method   09/21/2021 0 56 (L) 0 60 - 1 30 mg/dL Final     Comment:     Standardized to IDMS reference method   09/20/2021 0 51 (L) 0 60 - 1 30 mg/dL Final     Comment:     Standardized to IDMS reference method     /76 (BP Location: Right arm)   Pulse 74   Temp 98 °F (36 7 °C)   Resp 16   SpO2 92%   I/O last 3 completed shifts: In: 500 [IV Piggyback:500]  Out: -   Lab Results   Component Value Date/Time    BUN 25 09/23/2021 03:39 AM    WBC 7 38 09/23/2021 03:39 AM    HGB 13 1 09/23/2021 03:39 AM    HCT 38 8 09/23/2021 03:39 AM    MCV 97 09/23/2021 03:39 AM     09/23/2021 03:39 AM     Temp Readings from Last 3 Encounters:   09/23/21 98 °F (36 7 °C)   09/21/21 98 2 °F (36 8 °C)   07/14/21 (!) 96 9 °F (36 1 °C)     Vancomycin Days of Therapy: 1    Assessment/Plan  The patient is currently on vancomycin utilizing pulse dosing based on actual body weight  Baseline risks associated with therapy include: pre-existing renal impairment and advanced age  The patient is currently receiving 1g IV q12 hours and after clinical evaluation will be changed to 1500mg IV once, redose daily for level <15  Pharmacy will also follow closely for s/sx of nephrotoxicity, infusion reactions, and appropriateness of therapy  BMP and CBC will be ordered per protocol  Plan for random level in 24 hours at approximately 0730 on 9/24/21  Due to infection severity, will target a trough of 10-15 (mild infection/cellulitis)   Pharmacy will continue to follow the patients culture results and clinical progress daily      Yenny Beck, Pharmacist

## 2021-09-23 NOTE — SEPSIS NOTE
Sepsis Note   Lee Aggarwal 80 y o  male MRN: [id number]  Unit/Bed#: FT 02 Encounter: [id number]      qSOFA     Row Name 09/23/21 0430 09/23/21 0330 09/23/21 0316 09/23/21 0315 09/23/21 0300    Altered mental status GCS < 15  --  --  --  --  --    Respiratory Rate > / =22  0  0  0  0  0    Systolic BP < / =613  0  0  0  1  1    Q Sofa Score  0  0  0  1  1    Row Name 09/23/21 0252                Altered mental status GCS < 15  --        Respiratory Rate > / =06  0        Systolic BP < / =469  --        Q Sofa Score  --            Initial Sepsis Screening     Row Name 09/23/21 0455                Is the patient's history suggestive of a new or worsening infection? No  -AO        Suspected source of infection  --        Are two or more of the following signs & symptoms of infection both present and new to the patient?  --        Indicate SIRS criteria  --        If the answer is yes to both questions, suspicion of sepsis is present  --        If severe sepsis is present AND tissue hypoperfusion perists in the hour after fluid resuscitation or lactate > 4, the patient meets criteria for SEPTIC SHOCK  --        Are any of the following organ dysfunction criteria present within 6 hours of suspected infection and SIRS criteria that are NOT considered to be chronic conditions?   --        Organ dysfunction  --        Date of presentation of severe sepsis  --        Time of presentation of severe sepsis  --        Tissue hypoperfusion persists in the hour after crystalloid fluid administration, evidenced, by either:  --        Was hypotension present within one hour of the conclusion of crystalloid fluid administration?  --        Date of presentation of septic shock  --        Time of presentation of septic shock  --          User Key  (r) = Recorded By, (t) = Taken By, (c) = Cosigned By    [address] Name Provider Misha Sears MD Physician

## 2021-09-23 NOTE — H&P
[facility]&P Lisbeth Nash 1934, 80 y o  male MRN: [id number]  Unit/Bed#: FT 02 Encounter: [id number]  Primary Care Provider: Marie Ward MD   Date and time admitted to hospital: 9/23/2021  2:39 AM    * Suicide attempt [facility])  Assessment & Plan  · Patient reported that he lost himself and tried a suicide attempt at nighttime around 1:00 a m  On 9/23  He reported that he had depression  · He used razors and blades to cut both wrists and feet  His family found out shortly after his suicide attempt  Moderate amount of blood loss reported by patient  · Trauma team suture his lacerations  · X-ray wrist and feet for both right and left did not show any fracture or any abnormality      · Urine toxicology negative      Lab Results   Component Value Date    HGB 13 1 09/23/2021         Plans  · IV antibiotics for laceration: Renally adjusted cefepime and vancomycin  · One on one observation  · Psychiatric evaluate for suicide attempt with depression  · Palliative consult for stage IV lung cancer with brain Mets and suicide attempt  · DTaP injection booster given at ED      Acute kidney injury (TENZIN)   Assessment & Plan  Lab Results   Component Value Date    CREATININE 1 02 09/23/2021    CREATININE 0 56 (L) 09/21/2021    CREATININE 0 51 (L) 09/20/2021     · Patient with no history of CKD, baseline creatinine 0 5-0 6  · Gentle IV hydration sodium chloride 75 mL/hour  · Pharmacy consult done for renally adjusted dose for the cefepime and vancomycin for soft tissue injury in the setting of TENZIN    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  Assessment & Plan  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  · He is currently taking hydrocortisone 10 mg t i d  and Keppra    Hypokalemia  Assessment & Plan  Lab Results   Component Value Date    K 3 2 (L) 09/23/2021    K 4 0 09/21/2021     · He had received 20 mEq potassium chloride oral tablet 1 time at ED  · Repleted the 2nd dose of 20 mEq potassium chloride tablet  · Recheck potassium    Hypotension  Assessment & Plan  · Low Blood pressure with 68/47, 83/44 mm Hg in the ED  · He had received 500 cc sodium chloride bolus  · Recheck blood pressure acceptable    Visit Vitals  /76 (BP Location: Right arm)   Pulse 74   Temp 98 °F (36 7 °C)   Resp 16   SpO2 92%   Smoking Status Former Smoker       Lymphedema of both lower extremities  Assessment & Plan  · Patient complains of bilateral leg heaviness for 2 weeks  · On physical examination there are 2+ edema bilateral leg to Mid shin      VTE Pharmacologic Prophylaxis: VTE Score: 11 High Risk (Score >/= 5) - Pharmacological DVT Prophylaxis Ordered: heparin  Sequential Compression Devices Ordered  Code Status: Level 3 - DNAR and DNI   Discussion with family: Attempted to update  (son) via phone  Unable to contact  Anticipated Length of Stay: Patient will be admitted on an observation basis with an anticipated length of stay of less than 2 midnights secondary to Suicide attempt  Chief Complaint:  Suicide attempt with cutting bilateral wrists and feet    History of Present Illness:  Costa Orona is a 80 y o  male with a PMH of AFib, stage IV lung cancer with metastasis to the brain who presents with suicide attempt this early morning around 1:00 a m  He stated that he has depression and he lost her side last night and tried a suicide attempt using results and blades to get wrist and feet  His family found shortly after his suicide attempt  Moderate amount of blood loss  He was brought to the ED and he was evaluated by the trauma team   His lacerations were sutured at ED  He was found to have hypokalemia, hypotension and TENZIN  Urine toxicology negative  X-ray foot left and right and x-ray wrist left and right did not show any fracture  Chest x-ray showed no acute cardiopulmonary abnormality  Patient was on 1 on 1 observation  Psychiatric evaluation and palliative consult ordered  Review of Systems:  Review of Systems   Constitutional: Positive for fatigue  Negative for appetite change, chills, diaphoresis and fever  HENT: Negative for ear pain and sore throat  Eyes: Negative for pain and visual disturbance  Respiratory: Negative for cough and shortness of breath  Cardiovascular: Negative for chest pain and palpitations  Gastrointestinal: Negative for abdominal pain and vomiting  Genitourinary: Negative for dysuria and hematuria  Musculoskeletal: Positive for joint swelling  Negative for arthralgias and back pain  Bilateral lower extremity swelling and leg heaviness   Skin: Negative for color change and rash  Neurological: Negative for seizures and syncope  Psychiatric/Behavioral: Positive for self-injury and suicidal ideas  All other systems reviewed and are negative  Past Medical and Surgical History:   Past Medical History:   Diagnosis Date    AAA [facility]abdominal aortic aneurysm) ([facility] )     Atrial fibrillation ([facility] )     Cancer ([facility] ) 2014    lung ca- right lung  stage 3     Carotid artery disease ([facility] )     Ear problems     Hyperlipidemia     Hypertension     [facility]     Pneumonia     PVD (peripheral vascular disease) ([place] [address] )        Past Surgical History:   Procedure Laterality Date    BOWEL RESECTION      COLONOSCOPY      HERNIA REPAIR      X2    IR AORTAGRAM WITH RUN-OFF  5/29/2020    SEVERAL    IR AORTAGRAM WITH RUN-OFF  10/9/2020    IR LOWER EXTREMITY ANGIOGRAM  11/4/2020    IR LOWER EXTREMITY ANGIOGRAM  5/27/2021    AK CYSTOURETHROSCOPY W/IRRIG & EVAC CLOTS N/A 4/16/2021    Procedure: CYSTOSCOPY EVACUATION OF Shanique Jessica;  Surgeon: Twila Medina MD;  [facility];  Service: Urology    AK CYSTOURETHROSCOPY,FULGUR 2-5 CM LESN N/A 1/15/2021    Procedure: CYSTOSCOPY, TRANSURETHRAL RESECTION OF BLADDER TUMOR (TURBT);   Surgeon: Ana María Tejada Kermit Jacob MD;  Location: [address] [place] [address];  Service: Urology    [facility] 3RD+ ORD Baljit Castellon ABDL PEL/LXTR [facility] Left 10/9/2020    Procedure: ARTERIOGRAM Left lower extremity agram right femoral access with SFA stenting/angioplasty;  Surgeon: Adelaide Simmons MD;  [facility];  Service: Vascular    [facility] 3RD+ ORD SLCTV ABDL PEL/LXTR [facility] Right 11/4/2020    Procedure: ARTERIOGRAM RLE angiogram L fem access;  Surgeon: Ashley Robertson MD;  [facility];  Service: Vascular    GA TRANSURETHRAL ELEC-SURG PROSTATECTOM N/A 3/2/2021    Procedure: CYSTOSCOPY, TRANSURETHRAL RESECTION OF Surekha Armstrong;  Surgeon: Nanci Polanco MD;  Location: [address] [place] [address];  Service: Urology    PROSTATE SURGERY      REMOVAL VENOUS PORT (PORT-A-CATH)         Meds/Allergies:  Prior to Admission medications    Medication Sig Start Date End Date Taking? Authorizing Provider   aspirin (ECOTRIN LOW STRENGTH) 81 mg EC tablet Take 1 tablet (81 mg total) by mouth daily 6/10/21  Yes Farida Blue Doctor, MD   finasteride (PROSCAR) 5 mg tablet Take 5 mg by mouth daily   Yes Historical Provider, MD   hydrocortisone (CORTEF) 10 mg tablet Take 100 mg by mouth 3 (three) times a day    Historical Provider, MD   levETIRAcetam (KEPPRA) 500 mg tablet Take 500 mg by mouth every 12 (twelve) hours    Historical Provider, MD     I have reviewed home medications with patient personally  Allergies: Allergies   Allergen Reactions    Sulfa Antibiotics Shortness Of Breath     Other reaction(s): Other (See Comments)  SOB      Protamine      Other reaction(s):  Other (See Comments)  Did not work for patient         Social History:  Marital Status: Single   Occupation: none  Patient Pre-hospital Living Situation: Home  Patient Pre-hospital Level of Mobility: walks  Patient Pre-hospital Diet Restrictions: none  Substance Use History:   Social History     Substance and Sexual Activity   Alcohol Use Yes    Comment: SOCIAL     Social History     Tobacco Use   Smoking Status Former Smoker    Years: 30 00    Types: Cigarettes    Quit date: 1988    Years since quittin 7   Smokeless Tobacco Never Used     Social History     Substance and Sexual Activity   Drug Use Never       Family History:  Family History   Problem Relation Age of Onset    Alzheimer's disease Mother     Cancer Father        Physical Exam:     Vitals:   Blood Pressure: 146/87 (21)  Pulse: 82 (21)  Temperature: 98 °F (36 7 °C) (21 0252)  Respirations: 16 (21)  SpO2: 96 % (21)    Physical Exam  Vitals and nursing note reviewed  Constitutional:       Appearance: He is well-developed  He is ill-appearing  HENT:      Head: Normocephalic and atraumatic  Mouth/Throat:      Mouth: Mucous membranes are dry  Eyes:      Extraocular Movements: Extraocular movements intact  Conjunctiva/sclera: Conjunctivae normal    Cardiovascular:      Rate and Rhythm: Normal rate and regular rhythm  Heart sounds: Normal heart sounds  No murmur heard  No gallop  Pulmonary:      Effort: Pulmonary effort is normal  No respiratory distress  Breath sounds: Normal breath sounds  No wheezing  Abdominal:      Palpations: Abdomen is soft  Tenderness: There is no abdominal tenderness  Musculoskeletal:      Cervical back: Neck supple  Right lower leg: Edema present  Left lower leg: Edema present  Comments: 2+ bilateral lower extremity edema up to mid shin   Skin:     General: Skin is warm and dry  Findings: Lesion present  Comments: Both wrist and feet are covered with bandage    Neurological:      General: No focal deficit present  Mental Status: He is alert and oriented to person, place, and time            Additional Data:     Lab Results:  Results from last 7 days   Lab Units 21  0339   WBC Thousand/uL 7 38   HEMOGLOBIN g/dL 13 1   HEMATOCRIT % 38 8   PLATELETS Thousands/uL 166   NEUTROS PCT % 79* LYMPHS PCT % 13*   MONOS PCT % 7   EOS PCT % 0     Results from last 7 days   Lab Units 09/23/21  0339   SODIUM mmol/L 142   POTASSIUM mmol/L 3 2*   CHLORIDE mmol/L 102   CO2 mmol/L 32   BUN mg/dL 25   CREATININE mg/dL 1 02   ANION GAP mmol/L 8   CALCIUM mg/dL 8 5   ALBUMIN g/dL 3 0*   TOTAL BILIRUBIN mg/dL 1 70*   ALK PHOS U/L 101   ALT U/L 91*   AST U/L 38   GLUCOSE RANDOM mg/dL 202*     Results from last 7 days   Lab Units 09/23/21  0339   INR  1 12             Results from last 7 days   Lab Units 09/23/21  0510   LACTIC ACID mmol/L 3 8*       Imaging: Personally reviewed the following imaging: chest xray  XR chest 1 view portable   ED Interpretation by Jane Casey MD (09/23 0501)   RUL mass and portacath in place read by me  XR wrist 3+ views RIGHT   ED Interpretation by Jane Casey MD (09/23 0501)   No fx or dislocation or FB read by me  XR wrist 3+ views LEFT   ED Interpretation by Jane Casey MD (09/23 0501)   No fx or FB or dislocation read by me      XR foot 3+ views LEFT   ED Interpretation by Jane Casey MD (09/23 0501)   No fx or FB or dislocation read by me  XR foot 3+ views RIGHT   ED Interpretation by Jane Casey MD (09/23 0501)   No fx or FB or dislocation read by me  EKG and Other Studies Reviewed on Admission:   · EKG: Atrial fibrillation  HR 88/min  ** Please Note: This note has been constructed using a voice recognition system   **

## 2021-09-23 NOTE — ASSESSMENT & PLAN NOTE
Lab Results   Component Value Date    K 3 2 (L) 09/23/2021    K 4 0 09/21/2021     · He had received 20 mEq potassium chloride oral tablet 1 time at ED  · Repleted the 2nd dose of 20 mEq potassium chloride tablet  · Recheck potassium

## 2021-09-23 NOTE — ED NOTES
Pt observation started  Pt is currently in x-ray  Waiting in room for pt with pt's son       Silvia Benitez  09/23/21 7412

## 2021-09-23 NOTE — ED PROVIDER NOTES
History  Chief Complaint   Patient presents with    Suicide Attempt     Patient cut wrists and feet in suicide attempt     Patient is a 80year old male who cut both of wrists and feet tonight in a suicide attempt  Denies N/V  No fever  Was last seen at [facility] ED on 9/17/21 for hypokalemia  Patient has a h/o lung cancer with brain mets  Patient is on ASA  [facility] website checked on this patient and no Rx found  Dr Eric Henriquez AP will suture lacerations  Patient needed my urgent attention  History provided by:  Patient and EMS personnel   used: No    Suicide Attempt  Presenting symptoms: suicidal thoughts        Prior to Admission Medications   Prescriptions Last Dose Informant Patient Reported?  Taking?   aspirin (ECOTRIN LOW STRENGTH) 81 mg EC tablet 9/22/2021 at Unknown time Self No Yes   Sig: Take 1 tablet (81 mg total) by mouth daily   finasteride (PROSCAR) 5 mg tablet 9/22/2021 at Unknown time Self Yes Yes   Sig: Take 5 mg by mouth daily   hydrocortisone (CORTEF) 10 mg tablet Unknown at Unknown time  Yes No   Sig: Take 100 mg by mouth 3 (three) times a day   levETIRAcetam (KEPPRA) 500 mg tablet Unknown at Unknown time  Yes No   Sig: Take 500 mg by mouth every 12 (twelve) hours      Facility-Administered Medications: None       Past Medical History:   Diagnosis Date    AAA (abdominal aortic aneurysm) (HCC)     Atrial fibrillation [facility] )     Cancer ([facility] ) 2014    lung ca- right lung  stage 3     Carotid artery disease ([facility] )     Ear problems     Hyperlipidemia     Hypertension     PAD (peripheral artery disease) ([facility] )     Pneumonia     PVD (peripheral vascular disease) ([facility] )        Past Surgical History:   Procedure Laterality Date    BOWEL RESECTION      COLONOSCOPY      HERNIA REPAIR      X2    IR AORTAGRAM WITH RUN-OFF  5/29/2020    SEVERAL    IR AORTAGRAM WITH RUN-OFF  10/9/2020    IR LOWER EXTREMITY ANGIOGRAM  11/4/2020    IR LOWER EXTREMITY ANGIOGRAM  5/27/2021    SD CYSTOURETHROSCOPY W/IRRIG & EVAC CLOTS N/A 2021    Procedure: CYSTOSCOPY EVACUATION OF CLOTS; Shant Starks;  Surgeon: Benjamin Guzman MD;  Location:  MAIN OR;  Service: Urology    SD CYSTOURETHROSCOPY,FULGUR 2-5 CM LESN N/A 1/15/2021    Procedure: CYSTOSCOPY, TRANSURETHRAL RESECTION OF BLADDER TUMOR (TURBT); Surgeon: Lorena Heredia MD;  Location: [address] [place] [address];  Service: Urology    [facility] Left 10/9/2020    Procedure: ARTERIOGRAM Left lower extremity agram right femoral access with SFA stenting/angioplasty;  Surgeon: Arslan Simmons MD;  Location:  MAIN OR;  Service: Vascular    SD Godfrey Romero 3RD+ ORD SLCTV ABDL PEL/[facility] Right 2020    Procedure: ARTERIOGRAM RLE angiogram L fem access;  Surgeon: Karely Graf MD;  Location:  MAIN OR;  Service: Vascular    SD TRANSURETHRAL ELEC-SURG PROSTATECTOM N/A 3/2/2021    Procedure: CYSTOSCOPY, TRANSURETHRAL RESECTION OF Vincent Mail;  Surgeon: Lorena Heredia MD;  Location: [facility] MAIN OR;  Service: Urology    PROSTATE SURGERY      REMOVAL VENOUS PORT (PORT-A-CATH)         Family History   Problem Relation Age of Onset    Alzheimer's disease Mother     Cancer Father      I have reviewed and agree with the history as documented  E-Cigarette/Vaping    E-Cigarette Use Never User      E-Cigarette/Vaping Substances    Nicotine No     THC No     CBD No     Flavoring No     Other No     Unknown No      Social History     Tobacco Use    Smoking status: Former Smoker     Years: 30 00     Types: Cigarettes     Quit date: 1988     Years since quittin 7    Smokeless tobacco: Never Used   Vaping Use    Vaping Use: Never used   Substance Use Topics    Alcohol use: Yes     Comment: SOCIAL    Drug use: Never       Review of Systems   Unable to perform ROS: Other (has brain mets and is not very talkative at this time)   Constitutional: Negative for fever     Gastrointestinal: Negative for nausea and vomiting  Skin: Positive for wound (bilateral wrist and feet lacerations)  Psychiatric/Behavioral: Positive for dysphoric mood and suicidal ideas  All other systems reviewed and are negative  Physical Exam  Physical Exam  Vitals and nursing note reviewed  Constitutional:       General: He is in acute distress (moderate)  HENT:      Head: Normocephalic and atraumatic  Mouth/Throat:      Mouth: Mucous membranes are moist    Eyes:      General: No scleral icterus  Cardiovascular:      Rate and Rhythm: Normal rate  Rhythm irregular  Heart sounds: Normal heart sounds  No murmur heard  Pulmonary:      Effort: Pulmonary effort is normal  No respiratory distress  Breath sounds: Normal breath sounds  Abdominal:      General: Bowel sounds are normal       Palpations: Abdomen is soft  Tenderness: There is no abdominal tenderness  Musculoskeletal:         General: No deformity  Cervical back: Normal range of motion and neck supple  Right lower leg: No edema  Left lower leg: No edema  Skin:     General: Skin is warm and dry  Findings: No erythema or rash  Comments: Bilateral dorsal feet lacerations each 5 cm  Bilateral wrist lacerations 3 cm  No FB noted  Neurological:      General: No focal deficit present  Mental Status: He is alert        Comments: Not very verbal     Psychiatric:      Comments: Flat affect         Vital Signs  ED Triage Vitals   Temperature Pulse Respirations Blood Pressure SpO2   09/23/21 0252 09/23/21 0252 09/23/21 0252 09/23/21 0300 09/23/21 0252   98 °F (36 7 °C) 89 15 (!) 68/47 95 %      Temp src Heart Rate Source Patient Position - Orthostatic VS BP Location FiO2 (%)   -- 09/23/21 0252 09/23/21 0252 09/23/21 0252 --    Monitor Lying Right arm       Pain Score       --                  Vitals:    09/23/21 0316 09/23/21 0330 09/23/21 0430 09/23/21 0530   BP: 104/62 112/63 141/77 154/91   Pulse: 94 90 79 82   Patient Position - Orthostatic VS: Lying Lying Lying Lying         Visual Acuity      ED Medications  Medications   sodium chloride 0 9 % infusion (125 mL/hr Intravenous New Bag 9/23/21 0522)   potassium chloride 20 mEq IVPB (premix) (has no administration in time range)   lidocaine-epinephrine (XYLOCAINE/EPINEPHRINE) 1 %-1:100,000 injection 20 mL (20 mL Infiltration Given 9/23/21 0309)   tetanus-diphtheria-acellular pertussis (BOOSTRIX) IM injection 0 5 mL (0 5 mL Intramuscular Given 9/23/21 0345)   sodium chloride 0 9 % bolus 500 mL (0 mL Intravenous Stopped 9/23/21 0509)   cefepime (MAXIPIME) 1,000 mg in dextrose 5 % 50 mL IVPB (1,000 mg Intravenous New Bag 9/23/21 0544)       Diagnostic Studies  Results Reviewed     Procedure Component Value Units Date/Time    Urine Microscopic [[id number]]  (Abnormal) Collected: 09/23/21 0452    Lab Status: Final result Specimen: Urine, Clean Catch Updated: 09/23/21 0614     RBC, UA 1-2 /hpf      WBC, UA 10-20 /hpf      Epithelial Cells Occasional /hpf      Bacteria, UA Occasional /hpf     Urine culture [[id number]] Collected: 09/23/21 0452    Lab Status: In process Specimen: Urine, Clean Catch Updated: 09/23/21 0614    Lactic acid 2 Hours [[id number]]     Lab Status: No result Specimen: Blood     Lactic acid [[id number]]  (Abnormal) Collected: 09/23/21 0510    Lab Status: Final result Specimen: Blood from Arm, Right Updated: 09/23/21 0553     LACTIC ACID 3 8 mmol/L     Narrative:      Result may be elevated if tourniquet was used during collection  Blood culture #2 [[id number]] Collected: 09/23/21 0510    Lab Status: In process Specimen: Blood from Arm, Right Updated: 09/23/21 0514    Blood culture #1 [[id number]] Collected: 09/23/21 0510    Lab Status:  In process Specimen: Blood from Arm, Left Updated: 09/23/21 0514    Rapid drug screen, urine [[id number]]  (Normal) Collected: 09/23/21 0450    Lab Status: Final result Specimen: Urine, Clean Catch Updated: 09/23/21 0040     Amph/Meth UR Negative     Barbiturate Ur Negative     Benzodiazepine Urine Negative     Cocaine Urine Negative     Methadone Urine Negative     Opiate Urine Negative     PCP Ur Negative     THC Urine Negative     Oxycodone Urine Negative    Narrative:      FOR MEDICAL PURPOSES ONLY  IF CONFIRMATION NEEDED PLEASE CONTACT THE LAB WITHIN 5 DAYS  Drug Screen Cutoff Levels:  AMPHETAMINE/METHAMPHETAMINES  1000 ng/mL  BARBITURATES     200 ng/mL  BENZODIAZEPINES     200 ng/mL  COCAINE      300 ng/mL  METHADONE      300 ng/mL  OPIATES      300 ng/mL  PHENCYCLIDINE     25 ng/mL  THC       50 ng/mL  OXYCODONE      100 ng/mL    Urine Macroscopic, POC [[id number]]  (Abnormal) Collected: 09/23/21 0452    Lab Status: Final result Specimen: Urine Updated: 09/23/21 0453     Color, UA Yellow     Clarity, UA Clear     pH, UA 7 5     Leukocytes, UA Small     Nitrite, UA Positive     Protein, UA Trace mg/dl      Glucose, UA Negative mg/dl      Ketones, UA Trace mg/dl      Urobilinogen, UA 0 2 E U /dl      Bilirubin, UA Negative     Blood, UA Small     Specific [place], UA 1 020    Narrative:      CLINITEK RESULT    Troponin I [[id number]]  (Abnormal) Collected: 09/23/21 0339    Lab Status: Final result Specimen: Blood from Arm, Right Updated: 09/23/21 0424     Troponin I 0 08 ng/mL     TSH, 3rd generation with Free T4 reflex [[id number]]  (Normal) Collected: 09/23/21 0339    Lab Status: Final result Specimen: Blood from Arm, Right Updated: 09/23/21 0417     TSH 3RD GENERATON 2 883 uIU/mL     Narrative:      Patients undergoing fluorescein dye angiography may retain small amounts of fluorescein in the body for 48-72 hours post procedure  Samples containing fluorescein can produce falsely depressed TSH values  If the patient had this procedure,a specimen should be resubmitted post fluorescein clearance        Salicylate level [[id number]]  (Abnormal) Collected: 09/23/21 0339    Lab Status: Final result Specimen: Blood from Arm, Right Updated: 09/23/21 6739     Salicylate Lvl <3 mg/dL     Acetaminophen level-If concentration is detectable, please discuss with medical  on call   [[id number]]  (Abnormal) Collected: 09/23/21 0339    Lab Status: Final result Specimen: Blood from Arm, Right Updated: 09/23/21 0417     Acetaminophen Level <2 ug/mL     Ethanol [[id number]]  (Normal) Collected: 09/23/21 0339    Lab Status: Final result Specimen: Blood from Arm, Right Updated: 09/23/21 0411     Ethanol Lvl <3 mg/dL     Comprehensive metabolic panel [[id number]]  (Abnormal) Collected: 09/23/21 0339    Lab Status: Final result Specimen: Blood from Arm, Right Updated: 09/23/21 0408     Sodium 142 mmol/L      Potassium 3 2 mmol/L      Chloride 102 mmol/L      CO2 32 mmol/L      ANION GAP 8 mmol/L      BUN 25 mg/dL      Creatinine 1 02 mg/dL      Glucose 202 mg/dL      Calcium 8 5 mg/dL      Corrected Calcium 9 3 mg/dL      AST 38 U/L      ALT 91 U/L      Alkaline Phosphatase 101 U/L      Total Protein 5 6 g/dL      Albumin 3 0 g/dL      Total Bilirubin 1 70 mg/dL      eGFR 66 ml/min/1 73sq m     Narrative:      [place] guidelines for Chronic Kidney Disease (CKD):     Stage 1 with normal or high GFR (GFR > 90 mL/min/1 73 square meters)    Stage 2 Mild CKD (GFR = 60-89 mL/min/1 73 square meters)    Stage 3A Moderate CKD (GFR = 45-59 mL/min/1 73 square meters)    Stage 3B Moderate CKD (GFR = 30-44 mL/min/1 73 square meters)    Stage 4 Severe CKD (GFR = 15-29 mL/min/1 73 square meters)    Stage 5 End Stage CKD (GFR <15 mL/min/1 73 square meters)  Note: GFR calculation is accurate only with a steady state creatinine    Protime-INR [[id number]]  (Normal) Collected: 09/23/21 0339    Lab Status: Final result Specimen: Blood from Arm, Right Updated: 09/23/21 0403     Protime 14 4 seconds      INR 1 12    APTT [[id number]]  (Normal) Collected: 09/23/21 0339    Lab Status: Final result Specimen: Blood from Arm, Right Updated: 09/23/21 0403     PTT 26 seconds     CBC and differential [[id number]]  (Abnormal) Collected: 09/23/21 0339    Lab Status: Final result Specimen: Blood from Arm, Right Updated: 09/23/21 0352     WBC 7 38 Thousand/uL      RBC 4 02 Million/uL      Hemoglobin 13 1 g/dL      Hematocrit 38 8 %      MCV 97 fL      MCH 32 6 pg      MCHC 33 8 g/dL      RDW 15 4 %      MPV 10 0 fL      Platelets 501 Thousands/uL      nRBC 0 /100 WBCs      Neutrophils Relative 79 %      Immat GRANS % 1 %      Lymphocytes Relative 13 %      Monocytes Relative 7 %      Eosinophils Relative 0 %      Basophils Relative 0 %      Neutrophils Absolute 5 88 Thousands/µL      Immature Grans Absolute 0 05 Thousand/uL      Lymphocytes Absolute 0 94 Thousands/µL      Monocytes Absolute 0 49 Thousand/µL      Eosinophils Absolute 0 00 Thousand/µL      Basophils Absolute 0 02 Thousands/µL                  XR chest 1 view portable   ED Interpretation by Wolfgang Steve MD (09/23 0501)   RUL mass and portacath in place read by me  XR wrist 3+ views RIGHT   ED Interpretation by Wolfgang Steve MD (09/23 0501)   No fx or dislocation or FB read by me  XR wrist 3+ views LEFT   ED Interpretation by Wolfgang Steve MD (09/23 0501)   No fx or FB or dislocation read by me      XR foot 3+ views LEFT   ED Interpretation by Wolfgang Steve MD (09/23 0501)   No fx or FB or dislocation read by me  XR foot 3+ views RIGHT   ED Interpretation by Wolfgang Steve MD (09/23 0501)   No fx or FB or dislocation read by me                    Procedures  ECG 12 Lead Documentation Only    Date/Time: 9/23/2021 3:08 AM  Performed by: Wolfgang Steve MD  Authorized by: Wolfgang Steve MD     Indications / Diagnosis:  Hypotension  ECG reviewed by me, the ED Provider: yes    Patient location:  ED  Previous ECG:     Previous ECG:  Compared to current    Comparison ECG info:  9/17/21    Similarity:  Changes noted (not pasha now)  Rate:     ECG rate:  88    ECG rate assessment: normal    Rhythm:     Rhythm: atrial fibrillation    Ectopy:     Ectopy: none    QRS:     QRS axis:  Left    QRS intervals: Wide  Conduction:     Conduction: abnormal      Abnormal conduction: complete RBBB    ST segments:     ST segments:  Non-specific  T waves:     T waves: non-specific    Other findings:     Other findings: LVH    Comments:      I do not agree with computer reading of SR with AV dissociation    CriticalCare Time  Performed by: Amilcar Deleon MD  Authorized by: Amilcar Deleon MD     Critical care provider statement:     Critical care time (minutes):  45    Critical care time was exclusive of:  Separately billable procedures and treating other patients    Critical care was necessary to treat or prevent imminent or life-threatening deterioration of the following conditions: hypotension  Critical care was time spent personally by me on the following activities:  Obtaining history from patient or surrogate, development of treatment plan with patient or surrogate, evaluation of patient's response to treatment, examination of patient, ordering and performing treatments and interventions, ordering and review of laboratory studies, ordering and review of radiographic studies, re-evaluation of patient's condition and review of old charts    I assumed direction of critical care for this patient from another provider in my specialty: no               ED Course  ED Course as of Sep 23 0615   Thu Sep 23, 2021   0349 BP improving with IVFs  0408 K-dur po ordered  Potassium(!): 3 2   0423 Labs d/w son  Son states that patient was taken off diuretic and antihypertensives recently  0454 IV abx ordered  Nitrite, UA(!): Positive   0517 Patient declines po potassium so IV potassium ordered                   HEART Risk Score      Most Recent Value   Heart Score Risk Calculator   History  0 Filed at: 09/23/2021 0456   ECG  1 Filed at: 09/23/2021 0456   Age  2 Filed at: 09/23/2021 0456   Risk Factors  1 Filed at: 09/23/2021 0456   Troponin  1 Filed at: 09/23/2021 0456   HEART Score  5 Filed at: 09/23/2021 0456                  Initial Sepsis Screening     Row Name 09/23/21 0455                Is the patient's history suggestive of a new or worsening infection? No  -AO        Suspected source of infection  --        Are two or more of the following signs & symptoms of infection both present and new to the patient?  --        Indicate SIRS criteria  --        If the answer is yes to both questions, suspicion of sepsis is present  --        If severe sepsis is present AND tissue hypoperfusion perists in the hour after fluid resuscitation or lactate > 4, the patient meets criteria for SEPTIC SHOCK  --        Are any of the following organ dysfunction criteria present within 6 hours of suspected infection and SIRS criteria that are NOT considered to be chronic conditions? --        Organ dysfunction  --        Date of presentation of severe sepsis  --        Time of presentation of severe sepsis  --        Tissue hypoperfusion persists in the hour after crystalloid fluid administration, evidenced, by either:  --        Was hypotension present within one hour of the conclusion of crystalloid fluid administration?  --        Date of presentation of septic shock  --        Time of presentation of septic shock  --          User Key  (r) = Recorded By, (t) = Taken By, (c) = Cosigned By    [address] Name Provider Boone Delgado MD Physician          SBIRT 22yo+      Most Recent Value   SBIRT (23 yo +)   In order to provide better care to our patients, we are screening all of our patients for alcohol and drug use  Would it be okay to ask you these screening questions?   Unable to answer at this time Filed at: 09/23/2021 0300                    MDM  Number of Diagnoses or Management Options  Laceration of left foot, initial encounter  Laceration of right foot, initial encounter  Laceration of right wrist, initial encounter  Suicide attempt [facility])  Wrist laceration, left, initial encounter  Diagnosis management comments: DDx including but not limited to: depression, anxiety, PTSD, bipolar disorder, suicidal attempt, schizophrenia, schizoaffective disorder, personality disorder, substance abuse, metabolic abnormality, thyroid disease, feet lacerations, wrist lacerations          Amount and/or Complexity of Data Reviewed  Clinical lab tests: ordered and reviewed  Tests in the radiology section of CPT®: ordered and reviewed  Decide to obtain previous medical records or to obtain history from someone other than the patient: yes  Obtain history from someone other than the patient: yes  Review and summarize past medical records: yes  Discuss the patient with other providers: yes  Independent visualization of images, tracings, or specimens: yes        Disposition  Final diagnoses:   Suicide attempt ([facility] )   Wrist laceration, left, initial encounter   Laceration of right wrist, initial encounter   Laceration of right foot, initial encounter   Laceration of left foot, initial encounter   Hypotension   ETNZIN (acute kidney injury) ([facility] )   Hypokalemia   Atrial fibrillation (HCC)   Elevated troponin   UTI (urinary tract infection)   Elevated lactic acid level     Time reflects when diagnosis was documented in both MDM as applicable and the Disposition within this note     Time User Action Codes Description Comment    9/23/2021  2:47 AM Sam Nowak Gilma Reis Suicide attempt ([facility] )     9/23/2021  2:47 AM Sam Owens Add [P05 247H] Wrist laceration, left, initial encounter     9/23/2021  2:47 AM Sam Owens Add [F47 230Q] Laceration of right wrist, initial encounter     9/23/2021  2:47 AM Sam Owens Add [X45 169I] Laceration of right foot, initial encounter     9/23/2021  2:48 AM Sam Owens Add [S91 312A] Laceration of left foot, initial encounter 9/23/2021  4:09 AM Anthony Solan Add [I95 9] Hypotension     9/23/2021  4:09 AM Anthony Solan Add [N17 9] TENZIN (acute kidney injury) ([facility] )     9/23/2021  4:10 AM Anthony Solan Add [E87 6] Hypokalemia     9/23/2021  4:26 AM Anthony Solan Add [I48 91] Atrial fibrillation ([facility] )     9/23/2021  4:26 AM Anthony Solan Add [R77 8] Elevated troponin     9/23/2021  5:03 AM Anthony Solan Add [N39 0] UTI (urinary tract infection)     9/23/2021  5:59 AM Anthony Solan Add [R79 89] Elevated lactic acid level       ED Disposition     ED Disposition Condition Date/Time Comment    Admit Stable Thu Sep 23, 2021  5:21 AM Case was discussed with [facility] resident and the patient's admission status was agreed to be Admission Status: observation status to the service of Dr Roderick Hunt   Follow-up Information    None         Patient's Medications   Discharge Prescriptions    No medications on file     No discharge procedures on file      PDMP Review       Value Time User    PDMP Reviewed  Yes 9/23/2021  2:47 AM Hector Zeng MD          ED Provider  Electronically Signed by           Hector Zeng MD  09/23/21 0522       Hector Zeng MD  09/23/21 0559       Hector Zeng MD  09/23/21 8282

## 2021-09-23 NOTE — PROCEDURES
Laceration repair    Date/Time: 9/23/2021 3:51 AM  Performed by: Greyson Patel PA-C  Authorized by: Greyson Patel PA-C   Consent: Verbal consent obtained  Consent given by: patient  Patient understanding: patient states understanding of the procedure being performed  Patient identity confirmed: arm band  Body area: upper extremity  Location details: left wrist  Wound length (cm): three lacerations about 4-5 cm each    Foreign bodies: no foreign bodies  Tendon involvement: none  Nerve involvement: none  Vascular damage: no    Anesthesia:  Local Anesthetic: lidocaine 1% without epinephrine  Anesthetic total: 10 mL    Wound Dehiscence:  Superficial Wound Dehiscence: simple closure      Procedure Details:  Irrigation solution: saline  Amount of cleaning: standard  Skin closure: Ethilon (4-0)  Technique: simple  Approximation: close  Approximation difficulty: simple  Dressing: 4x4 sterile gauze and gauze roll  Patient tolerance: patient tolerated the procedure well with no immediate complications

## 2021-09-23 NOTE — ASSESSMENT & PLAN NOTE
· Low Blood pressure with 68/47, 83/44 mm Hg in the ED  · He had received 500 cc sodium chloride bolus  · Recheck blood pressure acceptable    Visit Vitals  /76 (BP Location: Right arm)   Pulse 74   Temp 98 °F (36 7 °C)   Resp 16   SpO2 92%   Smoking Status Former Smoker

## 2021-09-23 NOTE — ASSESSMENT & PLAN NOTE
Lab Results   Component Value Date    CREATININE 1 02 09/23/2021    CREATININE 0 56 (L) 09/21/2021    CREATININE 0 51 (L) 09/20/2021     · Patient with no history of CKD, baseline creatinine 0 5-0 6  · Gentle IV hydration sodium chloride 75 mL/hour  · Pharmacy consult done for renally adjusted dose for the cefepime and vancomycin for soft tissue injury in the setting of TENZIN

## 2021-09-23 NOTE — ED NOTES
Pt calm and cooperative  Lunch tray given to pt  1:1 maintained for pt safety        Hodan Jordan  09/23/21 0817

## 2021-09-23 NOTE — ASSESSMENT & PLAN NOTE
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  · He is currently taking hydrocortisone 10 mg t i d  and Keppra

## 2021-09-23 NOTE — ED NOTES
Assisted the patient to the bathroom  Pt did well walking with a walker       Ariel Magallon  09/23/21 7268

## 2021-09-23 NOTE — ASSESSMENT & PLAN NOTE
· Patient reported that he lost himself and tried a suicide attempt at nighttime around 1:00 a m  On 9/23  He reported that he had depression  · He used razors and blades to cut both wrists and feet  His family found out shortly after his suicide attempt  Moderate amount of blood loss reported by patient  · Trauma team suture his lacerations  · X-ray wrist and feet for both right and left did not show any fracture or any abnormality      · Urine toxicology negative      Lab Results   Component Value Date    HGB 13 1 09/23/2021         Plans  · IV antibiotics for laceration: Renally adjusted cefepime and vancomycin  · One on one observation  · Psychiatric evaluate for suicide attempt with depression  · Palliative consult for stage IV lung cancer with brain Mets and suicide attempt  · DTaP injection booster given at ED

## 2021-09-23 NOTE — ED NOTES
Pt calm in room with lights off   Breakfast menu given and questions/needs were vocalized     Enma Jordan  09/23/21 9280

## 2021-09-23 NOTE — ED NOTES
Pt laying in bed  Lunch menu given to pt  Pt calm and cooperative  Denies any SI at this time   1:1 continued for pt safety      Enma Jordan  09/23/21 1839

## 2021-09-23 NOTE — ED NOTES
Pt keeps looking, grabbing, and squeezing hands  I asked if he was okay but got no response       Silvia Benitez  09/23/21 6180

## 2021-09-23 NOTE — ASSESSMENT & PLAN NOTE
· Patient complains of bilateral leg heaviness for 2 weeks  · On physical examination there are 2+ edema bilateral leg to Mid shin

## 2021-09-23 NOTE — CONSULTS
Consultation - [address] [facility] Saad 80 y o  male MRN: [id number]  Unit/Bed#: FT 02 Encounter: [id number]      Chief Complaint: "I cut my arms and legs with a shaving razor"    History of Present Illness   Physician Requesting Consult: Madelaine Pablo MD  Reason for Consult / Principal Problem:  Suicide attempt    Paige Montero is a 80 y o  male with past medical history primary lung cancer with Mets to brain (cancer is advancing, diagnosed 2013), depression, presenting for suicide attempt (cut both wrists and feet) psychiatry consulted for suicide attempt  Patient signed 12  Prior records were reviewed  Patient reports that he cut himself around 1:00 a m  on 09/23/21 in the setting of worsening depression x1 month in the setting of advancing lung cancer with Mets to brain and leg edema  Patient reported he used razor blades, and his family found him shortly after with only moderate blood loss, reported by patient  In ED, trauma sutured lacerations and x-rays were normal, started on antibiotics  Urine tox negative  Patient being treated for TENZIN  Placed on 1:1, palliative brought on board  This morning, patient grabbed his chest and said why cant you just kill me and let me dying get on with yourselves  When we interviewed the patient, he endorsed 1 month of worsening depression that started when his leg edema again  He reports the leg edema resulted in him not being able to walk around the house or move  He can no longer function, and his way of life has changed  He reports that this suicide attempt was impulsive and not premeditated, although it was done at 1:00 a m  in the morning  He reports he was shaving his face and just walked to the shower and did it without thinking  He feels bad about doing this, and currently wants to live    He has intermittent thoughts of feeling helpless and hopeless, trouble sleeping due to the tightness in his legs and racing thoughts, and has decreased appetite, otherwise denies problems with interest, guilt, energy, concentration, psychomotor  Regarding suicidality, patient has no family history of suicide attempts, has no guarded home  He has a good support system in his son and daughter, and they feel bad about what happened  Denies being aggressive or isolative, and reports impulsivity with this previous attempt  Has never had a suicide attempt in the past, and has no firearm at home  Patient agreed to sign in to obtain 1 for further medication management and psychiatric stabilization  Psychiatric Review Of Systems:  Problems with sleep: yes, decreased  Appetite changes: yes, decreased  Weight changes: yes, decreased  Low energy/anergy: no  Low interest/pleasure/anhedonia: no  Somatic symptoms: no  Anxiety/panic: no  Vicki: no  Guilt: no  Self injurious behavior/risky behavior: yes    Historical Information   Prior psychiatric diagnoses: None  Inpatient hospitalizations: None  Suicide attempts:  None, besides current episode  Self-harm behaviors:  None, besides current episode  Outpatient treatment: None  Psychiatric medication trial: None    Substance Abuse History:  Social History     Tobacco Use    Smoking status: Former Smoker     Years: 30 00     Types: Cigarettes     Quit date: 1988     Years since quittin 7    Smokeless tobacco: Never Used   Vaping Use    Vaping Use: Never used   Substance Use Topics    Alcohol use: Yes     Comment: SOCIAL    Drug use: Never      Patient denies use of tobacco, alcohol, or illicit drugs  I have assessed this patient for substance use within the past 12 months    Family Psychiatric History:   Patient denies any known family history of psychiatric illness, suicide attempt, or substance abuse    Social History  Marital history: Single  Children: yes  Living arrangement: Lives in a home with son    Functioning Relationships: good support system  Other Pertinent History: None    Traumatic History:   Abuse: none reported  Other Traumatic Events: none reported    Past Medical History:   Diagnosis Date    AAA [facility]abdominal aortic aneurysm) ([facility] )     Atrial fibrillation ([facility] )     Cancer ([place] [address] ) 2014    lung ca- right lung  stage 3     Carotid artery disease ([facility] )     Ear problems     Hyperlipidemia     Hypertension     PAD (peripheral artery disease) ([facility] )     Pneumonia     PVD (peripheral vascular disease) ([place] [address] )        Medical Review Of Systems:  Review of Systems - Negative except as noted in HPI    Meds/Allergies   all current active meds have been reviewed  Allergies   Allergen Reactions    Sulfa Antibiotics Shortness Of Breath     Other reaction(s): Other (See Comments)  SOB      Protamine      Other reaction(s): Other (See Comments)  Did not work for patient         Objective   Vital signs in last 24 hours:  Temp:  [98 °F (36 7 °C)] 98 °F (36 7 °C)  HR:  [70-95] 95  Resp:  [15-18] 18  BP: ()/(44-92) 153/92    Mental Status Exam:  Appearance:  alert, good eye contact, appears stated age, casually dressed, appropriate grooming and hygiene and elderly   Behavior:  calm and cooperative   Motor: no abnormal movements   Speech:  spontaneous, normal rate and coherent   Mood:  depressed   Affect:  constricted and irritable   Thought Process:  Organized, logical, goal-directed   Thought Content: no verbalized delusions or overt paranoia   Perceptual disturbances: no reported hallucinations and does not appear to be responding to internal stimuli at this time   Risk Potential: No active or passive suicidal or homicidal ideation was verbalized during interview, Recent Suicidal lines this morning, stating why can not you just kill me just let me die I do want to live   Cognition: appears to be of average intelligence and cognition not formally tested   Insight:  Poor   Judgment: Poor     Laboratory results:  I have personally reviewed all pertinent laboratory/tests results          The following portions of the patient's history were reviewed and updated as appropriate: allergies, current medications, past family history, past medical history, past social history, past surgical history and problem list     Assessment/Plan     Diagnosis:  Major depressive disorder, severe, without psychotic features rule out adjustment disorder    Recommended Treatment:    Patient requires inpatient psychiatric hospitalization   Patient has agreed to sign a 12   Case Management following for inpatient placement   1-1 for patient and staff safety   Continue medical management per primary team   Will defer starting maintenance medications pending transfer to inpatient psychiatry  [facility] will sign off  Please call or TigerText the on-call team with any questions or concerns  Diagnoses were discussed with the patient  Available treatment options were discussed with the patient  Prior records were reviewed in [address] [place] [address]  The assessment and plan was discussed with the primary team      Risks, benefits and possible side effects of Medications:   Risks, benefits, and possible side effects of medications were explained to the patient, who verbalizes understanding            This note was not shared with the patient due to this is a psychotherapy note

## 2021-09-23 NOTE — ED NOTES
Pt began to grab his chest and moan  I asked what was wrong and he stated "my heart is weak and you guys want to kill me here by not giving me blood that I need " I asked if he was having chest pain or pressure and he wouldn't answer me so I shot an EKG and made RN aware   Pt then proceeded to say "why can't you kill me and just let me die and get on with yourselves "     Najma Miguel  09/23/21 6848

## 2021-09-24 NOTE — PLAN OF CARE
Problem: MOBILITY - ADULT  Goal: Maintain or return to baseline ADL function  Description: INTERVENTIONS:  -  Assess patient's ability to carry out ADLs; assess patient's baseline for ADL function and identify physical deficits which impact ability to perform ADLs (bathing, care of mouth/teeth, toileting, grooming, dressing, etc )  - Assess/evaluate cause of self-care deficits   - Assess range of motion  - Assess patient's mobility; develop plan if impaired  - Assess patient's need for assistive devices and provide as appropriate  - Encourage maximum independence but intervene and supervise when necessary  - Involve family in performance of ADLs  - Assess for home care needs following discharge   - Consider OT consult to assist with ADL evaluation and planning for discharge  - Provide patient education as appropriate  Outcome: Progressing  Goal: Maintains/Returns to pre admission functional level  Description: INTERVENTIONS:  - Perform BMAT or MOVE assessment daily    - Set and communicate daily mobility goal to care team and patient/family/caregiver  - Collaborate with rehabilitation services on mobility goals if consulted  - Perform Range of Motion  times a day  - Reposition patient every  hours    - Dangle patient times a day  - Stand patient  times a day  - Ambulate patient  times a day  - Out of bed to chair  times a day   - Out of bed for meals  times a day  - Out of bed for toileting  - Record patient progress and toleration of activity level   Outcome: Progressing     Problem: Prexisting or High Potential for Compromised Skin Integrity  Goal: Skin integrity is maintained or improved  Description: INTERVENTIONS:  - Identify patients at risk for skin breakdown  - Assess and monitor skin integrity  - Assess and monitor nutrition and hydration status  - Monitor labs   - Assess for incontinence   - Turn and reposition patient  - Assist with mobility/ambulation  - Relieve pressure over bony prominences  - Avoid friction and shearing  - Provide appropriate hygiene as needed including keeping skin clean and dry  - Evaluate need for skin moisturizer/barrier cream  - Collaborate with interdisciplinary team   - Patient/family teaching  - Consider wound care consult   Outcome: Progressing     Problem: PAIN - ADULT  Goal: Verbalizes/displays adequate comfort level or baseline comfort level  Description: Interventions:  - Encourage patient to monitor pain and request assistance  - Assess pain using appropriate pain scale  - Administer analgesics based on type and severity of pain and evaluate response  - Implement non-pharmacological measures as appropriate and evaluate response  - Consider cultural and social influences on pain and pain management  - Notify physician/advanced practitioner if interventions unsuccessful or patient reports new pain  Outcome: Progressing     Problem: INFECTION - ADULT  Goal: Absence or prevention of progression during hospitalization  Description: INTERVENTIONS:  - Assess and monitor for signs and symptoms of infection  - Monitor lab/diagnostic results  - Monitor all insertion sites, i e  indwelling lines, tubes, and drains  - Monitor endotracheal if appropriate and nasal secretions for changes in amount and color  - Dearing appropriate cooling/warming therapies per order  - Administer medications as ordered  - Instruct and encourage patient and family to use good hand hygiene technique  - Identify and instruct in appropriate isolation precautions for identified infection/condition  Outcome: Progressing  Goal: Absence of fever/infection during neutropenic period  Description: INTERVENTIONS:  - Monitor WBC    Outcome: Progressing     Problem: SAFETY ADULT  Goal: Maintain or return to baseline ADL function  Description: INTERVENTIONS:  -  Assess patient's ability to carry out ADLs; assess patient's baseline for ADL function and identify physical deficits which impact ability to perform ADLs (bathing, care of mouth/teeth, toileting, grooming, dressing, etc )  - Assess/evaluate cause of self-care deficits   - Assess range of motion  - Assess patient's mobility; develop plan if impaired  - Assess patient's need for assistive devices and provide as appropriate  - Encourage maximum independence but intervene and supervise when necessary  - Involve family in performance of ADLs  - Assess for home care needs following discharge   - Consider OT consult to assist with ADL evaluation and planning for discharge  - Provide patient education as appropriate  Outcome: Progressing  Goal: Maintains/Returns to pre admission functional level  Description: INTERVENTIONS:  - Perform BMAT or MOVE assessment daily    - Set and communicate daily mobility goal to care team and patient/family/caregiver  - Collaborate with rehabilitation services on mobility goals if consulted  - Perform Range of Motion  times a day  - Reposition patient every  hours    - Dangle patient  times a day  - Stand patient  times a day  - Ambulate patient  times a day  - Out of bed to chair times a day   - Out of bed for meals  times a day  - Out of bed for toileting  - Record patient progress and toleration of activity level   Outcome: Progressing  Goal: Patient will remain free of falls  Description: INTERVENTIONS:  - Educate patient/family on patient safety including physical limitations  - Instruct patient to call for assistance with activity   - Consult OT/PT to assist with strengthening/mobility   - Keep Call bell within reach  - Keep bed low and locked with side rails adjusted as appropriate  - Keep care items and personal belongings within reach  - Initiate and maintain comfort rounds  - Make Fall Risk Sign visible to staff  - Offer Toileting every  Hours, in advance of need  - Initiate/Maintain alarm  - Obtain necessary fall risk management equipment:   - Apply yellow socks and bracelet for high fall risk patients  - Consider moving patient to room near nurses station  Outcome: Progressing     Problem: SAFETY ADULT  Goal: Maintain or return to baseline ADL function  Description: INTERVENTIONS:  -  Assess patient's ability to carry out ADLs; assess patient's baseline for ADL function and identify physical deficits which impact ability to perform ADLs (bathing, care of mouth/teeth, toileting, grooming, dressing, etc )  - Assess/evaluate cause of self-care deficits   - Assess range of motion  - Assess patient's mobility; develop plan if impaired  - Assess patient's need for assistive devices and provide as appropriate  - Encourage maximum independence but intervene and supervise when necessary  - Involve family in performance of ADLs  - Assess for home care needs following discharge   - Consider OT consult to assist with ADL evaluation and planning for discharge  - Provide patient education as appropriate  Outcome: Progressing  Goal: Maintains/Returns to pre admission functional level  Description: INTERVENTIONS:  - Perform BMAT or MOVE assessment daily    - Set and communicate daily mobility goal to care team and patient/family/caregiver  - Collaborate with rehabilitation services on mobility goals if consulted  - Perform Range of Motion  times a day  - Reposition patient every  hours    - Dangle patient  times a day  - Stand patient  times a day  - Ambulate patient times a day  - Out of bed to chair  times a day   - Out of bed for meals  times a day  - Out of bed for toileting  - Record patient progress and toleration of activity level   Outcome: Progressing  Goal: Patient will remain free of falls  Description: INTERVENTIONS:  - Educate patient/family on patient safety including physical limitations  - Instruct patient to call for assistance with activity   - Consult OT/PT to assist with strengthening/mobility   - Keep Call bell within reach  - Keep bed low and locked with side rails adjusted as appropriate  - Keep care items and personal belongings within reach  - Initiate and maintain comfort rounds  - Make Fall Risk Sign visible to staff  - Offer Toileting every  Hours, in advance of need  - Initiate/Maintain larm  - Obtain necessary fall risk management equipment:   - Apply yellow socks and bracelet for high fall risk patients  - Consider moving patient to room near nurses station  Outcome: Progressing     Problem: DISCHARGE PLANNING  Goal: Discharge to home or other facility with appropriate resources  Description: INTERVENTIONS:  - Identify barriers to discharge w/patient and caregiver  - Arrange for needed discharge resources and transportation as appropriate  - Identify discharge learning needs (meds, wound care, etc )  - Arrange for interpretive services to assist at discharge as needed  - Refer to Case Management Department for coordinating discharge planning if the patient needs post-hospital services based on physician/advanced practitioner order or complex needs related to functional status, cognitive ability, or social support system  Outcome: Progressing     Problem: COPING  Goal: Pt/Family able to verbalize concerns and demonstrate effective coping strategies  Description: INTERVENTIONS:  - Assist patient/family to identify coping skills, available support systems and cultural and spiritual values  - Provide emotional support, including active listening and acknowledgement of concerns of patient and caregivers  - Reduce environmental stimuli, as able  - Provide patient education  - Assess for spiritual pain/suffering and initiate spiritual care, including notification of Pastoral Care or jorge alberto based community as needed  - Assess effectiveness of coping strategies  Outcome: Progressing  Goal: Will report anxiety at manageable levels  Description: INTERVENTIONS:  - Administer medication as ordered  - Teach and encourage coping skills  - Provide emotional support  - Assess patient/family for anxiety and ability to cope  Outcome: Progressing Problem: DEATH & DYING  Goal: Pt/Family communicate acceptance of impending death and expresses psychological comfort and peace  Description: INTERVENTIONS:  - Assess patient/family anxiety and grief process related to end of life issues  - Provide emotional, spiritual and psychosocial support  - Provide information about the patients health status with consideration of family and cultural values  - Communicate willingness to discuss death and facilitate grief process  with patient/family as appropriate  - Emphasize sustaining relationships within family system and community, or jorge alberto/spiritual traditions  - Initiate Spiritual Care, Pastoral care or other ancillary consults as needed  - Refer to community support groups as appropriate  Outcome: Progressing     Problem: CHANGE IN BODY IMAGE  Goal: Pt/Family communicate acceptance of loss or change in body image and expresses psychological comfort and peace  Description: INTERVENTIONS:  - Assess patient/family anxiety and grief process related to change in body image, loss of functional status and loss of sense of self  - Assess patient/family's coping skills and provide emotional, spiritual and psychosocial support  - Provide information about the patient's health status with consideration of family and cultural values  - Communicate willingness to discuss loss and facilitate grief process with patient/family as appropriate  - Emphasize sustaining relationships within family system and community, or jorge alberto/spiritual traditions  - Refer to community support groups as appropriate  - Initiate Spiritual Care, Pastoral care or other ancillary consults as needed  Outcome: Progressing     Problem: DECISION MAKING  Goal: Pt/Family able to effectively weigh alternatives and participate in decision making related to treatment and care  Description: INTERVENTIONS:  - Identify decision maker  - Determine when there are differences among patient's view, family's view, and healthcare provider's view of patient condition and care goals  - Facilitate patient/family articulation of goals for care  - Help patient/family identify pros/cons of alternative solutions  - Provide information as requested by patient/family  - Respect patient/family rights related to privacy and sharing information   - Serve as a liaison between patient, family and health care team  - Initiate consults as appropriate (Ethics Team, Palliative Care, Family Care Conference, etc )  Outcome: Progressing     Problem: SELF HARM  Goal: Effect of psychiatric condition will be minimized and patient will be protected from self harm  Description: INTERVENTIONS:  - Assess impact of patient's symptoms on level of functioning, self-care needs and offer support as indicated  - Assess patient/family knowledge of depression, impact on illness and need for teaching  - Provide emotional support, presence and reassurance  - Assess for possible suicidal thoughts, ideation or self-harm  If patient expresses suicidal thoughts or statements do not leave alone, notify physician/AP immediately, initiate suicide precautions, and determine need for continual observation    - initiate consults and referrals as appropriate (a mental health professional, Spiritual Care  Outcome: Progressing     Problem: Potential for Falls  Goal: Patient will remain free of falls  Description: INTERVENTIONS:  - Educate patient/family on patient safety including physical limitations  - Instruct patient to call for assistance with activity   - Consult OT/PT to assist with strengthening/mobility   - Keep Call bell within reach  - Keep bed low and locked with side rails adjusted as appropriate  - Keep care items and personal belongings within reach  - Initiate and maintain comfort rounds  - Make Fall Risk Sign visible to staff  - Offer Toileting every  Hours, in advance of need  - Initiate/Maintain alarm  - Obtain necessary fall risk management equipment:   - Apply yellow socks and bracelet for high fall risk patients  - Consider moving patient to room near nurses station  Outcome: Progressing     Problem: Nutrition/Hydration-ADULT  Goal: Nutrient/Hydration intake appropriate for improving, restoring or maintaining nutritional needs  Description: Monitor and assess patient's nutrition/hydration status for malnutrition  Collaborate with interdisciplinary team and initiate plan and interventions as ordered  Monitor patient's weight and dietary intake as ordered or per policy  Utilize nutrition screening tool and intervene as necessary  Determine patient's food preferences and provide high-protein, high-caloric foods as appropriate       INTERVENTIONS:  - Monitor oral intake, urinary output, labs, and treatment plans  - Assess nutrition and hydration status and recommend course of action  - Evaluate amount of meals eaten  - Assist patient with eating if necessary   - Allow adequate time for meals  - Recommend/ encourage appropriate diets, oral nutritional supplements, and vitamin/mineral supplements  - Order, calculate, and assess calorie counts as needed  - Recommend, monitor, and adjust tube feedings and TPN/PPN based on assessed needs  - Assess need for intravenous fluids  - Provide specific nutrition/hydration education as appropriate  - Include patient/family/caregiver in decisions related to nutrition  Outcome: Progressing

## 2021-09-24 NOTE — ASSESSMENT & PLAN NOTE
· Patient reported that he lost himself and tried a suicide attempt at nighttime around 1:00 a m  On 9/23  He reported that he had depression  · He used razors and blades to cut both wrists and feet  His family found out shortly after his suicide attempt  Moderate amount of blood loss reported by patient  · Trauma team suture his lacerations  · X-ray wrist and feet for both right and left did not show any fracture or any abnormality  · Urine toxicology negative    Plans  · Psych Consulted, appreciate recommendations  201 signed    Case management on board for inpatient psych placement  · One on one observation

## 2021-09-24 NOTE — CASE MANAGEMENT
Case Management Progress Note    Patient name [facility] /S -01 MRN [id number]  : 1934 Date 2021       LOS (days): 0  Geometric Mean LOS (GMLOS) (days):   Days to GMLOS:        OBJECTIVE:  Bundle(if applicable):    Current admission status: Inpatient  Preferred Pharmacy:   [facility] DR FRANKLIN OLIVA [address] [facility], [address] [place] [address] Road  [address] [place] [address]  Phone: [phone] Fax: [phone]    Primary Care Provider: Branden Humphries MD    Primary Insurance: MEDICARE  Secondary Insurance: AARP    PROGRESS NOTE:    CM met with patient at bedside to discuss 12, IP BHU placement, and bed search  Patient states he feels optimistic, better, no longer depressed, and verbalizes a desire to go home  He states he doesn't want to go to [employer], and that he didn't know what he was signing when he signed the 201 yesterday because he didn't read it and he wasn't told  CM verbalizes understanding and reviewed that given the severity of his injuries in an attempt to harm himself it is highly unlikely he will be able to discharge home and will require inpatient psychiatric treatment at discharge  Patient's disagrees and CM explains that a 302 may be needed if he revokes the 201  Patient continues to insist he had improved and won't go to [facility]  CM agree to request psych re-evaluate patient  CM reached out to Dr Reynaldo Terry who evaluated patient yesterday  He agrees that patient may require 302 if he refuses voluntary treatment  He suggests Cari re-evaluate the patient as onsite Psych service is unavailable today  CM notified unit CAIT Childers of need  She states televisit ipads are not working and she has already called for assistance  Cari to see patient once service is available  CM notified Dr Teressa [place] of same

## 2021-09-24 NOTE — PROGRESS NOTES
[facility]  Progress Note Sonam Nash 1934, 80 y o  male MRN: [id number]  Unit/Bed#: S -01 Encounter: [id number]  Primary Care Provider: Keenan Swanson MD   Date and time admitted to hospital: 9/23/2021  2:39 AM    * Suicide attempt [facility])  Assessment & Plan  · Patient reported that he lost himself and tried a suicide attempt at nighttime around 1:00 a m  On 9/23  He reported that he had depression  · He used razors and blades to cut both wrists and feet  His family found out shortly after his suicide attempt  Moderate amount of blood loss reported by patient  · Trauma team suture his lacerations  · X-ray wrist and feet for both right and left did not show any fracture or any abnormality  · Urine toxicology negative    Plans  · Psych Consulted, appreciate recommendations  201 signed  Case management on board for inpatient psych placement  · One on one observation      Lymphedema of both lower extremities  Assessment & Plan  · Patient complains of bilateral leg heaviness for 2 weeks  · Compression stockings      Acute kidney injury (TENZIN)   Assessment & Plan  · Patient with no history of CKD, baseline creatinine 0 5-0 6  · Resolved post IV hydration    Hypokalemia  Assessment & Plan  Likely secondary to poor oral intake  Repleted, monitor and replete as needed    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  Assessment & Plan  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  · He is currently taking hydrocortisone 10 mg t i d  and Keppra          VTE Pharmacologic Prophylaxis:   VTE Score: 11 High Risk (Score >/= 5) - Pharmacological DVT Prophylaxis Ordered: Heparin  Sequential Compression Devices Ordered      Mechanical VTE Prophylaxis in Place: No    Patient Centered Rounds: Discussed with nursing    Discussions with Specialists or Other Care Team Provider:  Case management, nursing    Education and Discussions with Family / Patient: Attempted to update  (son) via phone  Left voicemail  Current Length of Stay: 0 day(s)    Current Patient Status: Observation     Discharge Plan / Estimated Discharge Date: Pending placement to inpatient psych    Code Status: Level 3 - DNAR and DNI      Subjective:   Patient denies any complaints other than constipation  No acute events overnight    Objective:     Vitals:   Temp (24hrs), Av °F (36 7 °C), Min:98 °F (36 7 °C), Max:98 °F (36 7 °C)    Temp:  [98 °F (36 7 °C)] 98 °F (36 7 °C)  HR:  [] 88  Resp:  [16] 16  BP: (116-174)/(67-82) 135/73  SpO2:  [95 %-100 %] 99 %  Body mass index is 25 24 kg/m²  Input and Output Summary (last 24 hours): Intake/Output Summary (Last 24 hours) at 2021 1419  Last data filed at 2021 1358  Gross per 24 hour   Intake 590 ml   Output 1625 ml   Net -1035 ml       Physical Exam:     Physical Exam  Vitals and nursing note reviewed  Constitutional:       Appearance: He is well-developed  He is not ill-appearing  HENT:      Head: Normocephalic and atraumatic  Mouth/Throat:      Mouth: Mucous membranes are moist       Pharynx: Oropharynx is clear  Eyes:      Extraocular Movements: Extraocular movements intact  Conjunctiva/sclera: Conjunctivae normal    Cardiovascular:      Rate and Rhythm: Normal rate  Heart sounds: Normal heart sounds  No murmur heard  Pulmonary:      Effort: Pulmonary effort is normal  No respiratory distress  Breath sounds: Normal breath sounds  No wheezing or rales  Abdominal:      General: Bowel sounds are normal       Palpations: Abdomen is soft  Tenderness: There is no abdominal tenderness  Musculoskeletal:      Cervical back: Neck supple  Right lower leg: Edema present  Left lower leg: Edema present  Skin:     General: Skin is warm and dry  Findings: No lesion  Neurological:      General: No focal deficit present        Mental Status: He is alert and oriented to person, place, and time  Psychiatric:         Behavior: Behavior normal           Additional Data:     Labs:  Results from last 7 days   Lab Units 09/24/21  0434   WBC Thousand/uL 4 64   HEMOGLOBIN g/dL 9 4*   HEMATOCRIT % 26 8*   PLATELETS Thousands/uL 124*   NEUTROS PCT % 71   LYMPHS PCT % 19   MONOS PCT % 9   EOS PCT % 0     Results from last 7 days   Lab Units 09/24/21  1156 09/23/21  1039 09/23/21  0339   SODIUM mmol/L 141   < > 142   POTASSIUM mmol/L 3 1*  --  3 2*   CHLORIDE mmol/L 105   < > 102   CO2 mmol/L 27   < > 32   BUN mg/dL 16   < > 25   CREATININE mg/dL 0 74   < > 1 02   ANION GAP mmol/L 9   < > 8   CALCIUM mg/dL 7 9*   < > 8 5   ALBUMIN g/dL  --   --  3 0*   TOTAL BILIRUBIN mg/dL  --   --  1 70*   ALK PHOS U/L  --   --  101   ALT U/L  --   --  91*   AST U/L  --   --  38   GLUCOSE RANDOM mg/dL 91   < > 202*    < > = values in this interval not displayed  Results from last 7 days   Lab Units 09/23/21  0339   INR  1 12             Results from last 7 days   Lab Units 09/24/21  0138 09/23/21  2330 09/23/21  2142 09/23/21  1857   LACTIC ACID mmol/L 1 7 2 1* 2 3* 3 2*       Imaging: No pertinent imaging reviewed  Recent Cultures (last 7 days):     Results from last 7 days   Lab Units 09/23/21  0510 09/23/21  0452   BLOOD CULTURE  No Growth at 24 hrs    No Growth at 24 hrs   --    URINE CULTURE   --  >100,000 cfu/ml Gram Negative Len Enteric Like*       Lines/Drains:  Invasive Devices     Peripheral Intravenous Line            Peripheral IV 09/23/21 Right Arm 1 day                Telemetry:        Last 24 Hours Medication List:   Current Facility-Administered Medications   Medication Dose Route Frequency Provider Last Rate    aspirin  81 mg Oral Daily May Samaria Puentes MD      finasteride  5 mg Oral Daily May Thu Alida Meek MD      heparin (porcine)  5,000 Units Subcutaneous [facility] [address] May Samaria Puentes MD      hydrocortisone  10 mg Oral TID May Samaria Puentes MD      levETIRAcetam  500 mg Oral Q12H Joseph Daugherty MD      metoprolol  5 mg Intravenous Q6H PRN Ricardo Joy MD          Today, Patient Was Seen By: Alondra Coronado MD    ** Please Note: This note has been constructed using a voice recognition system   **

## 2021-09-25 NOTE — CONSULTS
PSYCHIATRIC CONSULT/EVALUATION      Patient Location/Referral Source: [facility]  Interactive Complexity:  [None]  Patient Consented to Telemedicine:  Yes  Reason for Consult: Suicide Attempt  Evaluate to determine diagnosis, treatment and disposition  ____________________________________________    DIAGNOSITC IMPRESSION:   Current Symptomatology is consistent with a diagnosis of an mood disorder due to the use of keppra which causing the patient to experience an agitated state and unintended self-injurious behavior which is now resolved  Currently, there is no evidence of ongoing agitation,  psychosis, anali, dementia, delirium, alcohol/substance abuse problems or severe depression  RISK LEVEL:  Moderate Risk: There was clinically significant risk of harm to self but there is no ongoing risk to warrant the use of inpatient psychiatric services  Mental Health Disorders:    Unspecified Mood Disorder, resolved  Unspecified Psychotic Disorder, resolved     Personality Traits/Disorders: Deferred  Medical Disorders: See medical records  Psychosocial Stressors :Psychological symptoms related to drug induced mental health symptoms   GAF Scale:  80 Current    The patient [is] no  imminent threat to self and/or grave disability requiring inpatient psychiatric services  RECOMMENDATIONS/PLANS:    1  Discharge to home when medically appropriate and stable  2   Medications:Discontinue Keppra Patient Caregiver/Family Education: The risks, benefits and alternatives to treatment were discussed  3   Refer to PCP for further evaluation and mgmt of patient's conditions  5   RTC/ER if SI/HI/AVH occurs or condition worsening        =============================================    Reason for Admission/Chief complaint:  I impulseively cut myself w/o having any suiciddal ideations, plans/intent or command hallucintions  History of Present Illness:   This patient is a 80 y o  male who reportedly  has a past medical history of AAA (abdominal aortic aneurysm) ([facility] ), Atrial fibrillation ([facility] ), Cancer ([facility] ) (2014), Carotid artery disease ([facility] ), Ear problems, Hyperlipidemia, Hypertension, PAD (peripheral artery disease) ([facility] ), Pneumonia, PVD (peripheral vascular disease) ([facility] ), and Renal disorder  He was admitted after he cut both of wrists and feet last PM in a presumed suicide attempt  He has a  history of stage IV lung cancer and does not know what happened and what was going on in his mind any at this strong urge to harm himself  He said he has been having swelling in his lower extremities other over last 3 weeks  He is tired of swelling in his legs  He feels that he cannot function like this  He had been experiencing increased agitation while using keppra prescribed by his neurologist   Upon further evaluation today,Patient denied having any current suicidal ideations, suicidal or self injurious behavior, homicidal ideations, history of violence, ritualistic or bizarre behaviors  Regarding depressive symptoms, patient denies having depressed, irritable or anxious moods occurring nearly daily and lasting on most of day, denies having difficulty enjoying previously enjoyable activities, feelings of worthlessness, helplessness, hopelessness, low energy and motivation, difficulty sleeping, oversleeping or appetite fluctuations  Regarding anxiety symptoms, the patient denies being excessively worried and stressed-out on most days due to upcoming and past events  Patient denies having increased anxiety in social settings, crowded places or public speaking, and does not have any increased nervousness or fear being scrutinized by others  Patient further denies having any unexplainable episodes of elevated heart rate, increased sweatiness, feelings of apprehensiveness    Patient also denies having racing thoughts, accompanied by irritability, low frustration tolerance, difficulty concentrating, muscle tension/headaches, difficulty relaxing which causes difficulty falling asleep and/or staying asleep  Regarding trauma related symptoms, patient denies having recurrent nightmares, flashbacks or bad memories of traumatic events  Patient denies feeling easily startled by loud noises other environmental factors which correspond with traumatic experiences  Patient denies being hypervigilant or on the lookout for situations or people that trigger memories and bad thoughts of threatening events  Regarding bipolar symptoms, the client denies having elevated/euphoric or elated moods, mood swings, high/unexplained irritability, high levels of energy, decreased need for sleep, racing thoughts, increased goal directedness or impaired judgement, e g  hyper religiosity, hypersexuality, buying sprees, grandiosity, or impaired impulse control  Patient also denies experiencing episodes of pressured speech, disorganized thoughts and behaviors which are out of character with the client's known/stable personality  Regarding psychotic symptoms, the client denies experiencing any auditory or visual hallucinations, paranoia, unshakable beliefs in things that are not true, disorganized, odd, or bizarre thoughts, magical thinking or problems with disorganized or erratic speech or behaviors  the patient does not agree to undergo inpatient psychiatric services because he feels as though he is back to his usual clear thinking self after Keppra was discontinued    Prior to the use of keppra patient stated that he had no mental health concerns  However,   Past Psychiatric History: Prior Mental Health Diagnosis (diomedes):  Psych Hospitalization: [None]  Prior Suicide attempts/self injurious behavior: [None]  Substance Use History:  Drug Use History: [None]  Alcohol Use History:  [None]  Family Psychiatric History: [None]      Past Medical History:   Past Medical History:   Diagnosis Date    AAA (abdominal aortic aneurysm) [facility])     Atrial fibrillation ([facility] )     Cancer ([facility] ) 2014    lung ca- right lung  stage 3     Carotid artery disease ([place] [address] )     Ear problems     Hyperlipidemia     Hypertension     [facility]     Pneumonia     PVD (peripheral vascular disease) ([place] [address] )     Renal disorder      No current facility-administered medications on file prior to encounter  Current Outpatient Medications on File Prior to Encounter   Medication Sig Dispense Refill    aspirin (ECOTRIN LOW STRENGTH) 81 mg EC tablet Take 1 tablet (81 mg total) by mouth daily      finasteride (PROSCAR) 5 mg tablet Take 5 mg by mouth daily      hydrocortisone (CORTEF) 10 mg tablet Take 100 mg by mouth 3 (three) times a day      levETIRAcetam (KEPPRA) 500 mg tablet Take 500 mg by mouth every 12 (twelve) hours       Allergies   Allergen Reactions    Sulfa Antibiotics Shortness Of Breath     Other reaction(s): Other (See Comments)  SOB      Protamine      Other reaction(s): Other (See Comments)  Did not work for patient       Psychosocial history:  Patient was the product of a normal childbirth  Patient had no developmental delays  Patient has [no history of abuse or neglect]    Educational: Patient [did] complete high school; had [no] history of learning disorders or special education services use]  Social History     Socioeconomic History    Marital status: Single     Spouse name: Not on file    Number of children: Not on file    Years of education: Not on file    Highest education level: Not on file   Occupational History    Not on file   Tobacco Use    Smoking status: Former Smoker     Years: 30 00     Types: Cigarettes     Start date: 1949     Quit date: 1988     Years since quittin 7    Smokeless tobacco: Never Used   Vaping Use    Vaping Use: Never used   Substance and Sexual Activity    Alcohol use: Yes     Comment: SOCIAL    Drug use: Never    Sexual activity: Never   Other Topics Concern    Not on file   Social History Narrative    Not on file     Social Determinants of Health     Financial Resource Strain:     Difficulty of Paying Living Expenses:    Food Insecurity:     Worried About Running Out of Food in the Last Year:     [address] in the Last Year:    Transportation Needs:     Lack of Transportation (Medical):  Lack of Transportation (Non-Medical):    Physical Activity:     Days of Exercise per Week:     Minutes of Exercise per Session:    Stress:     Feeling of Stress :    Social Connections:     Frequency of Communication with Friends and Family:     Frequency of Social Gatherings with Friends and Family:     Attends Jain Services:     Active Member of Clubs or Organizations:     Attends Club or Organization Meetings:     Marital Status:    Intimate Partner Violence:     Fear of Current or Ex-Partner:     Emotionally Abused:     Physically Abused:     Sexually Abused:      Physical findings     Vital Signs:  /87 (BP Location: Left arm)   Pulse 96   Temp 98 2 °F (36 8 °C) (Oral)   Resp 16   Ht 5' 5" (1 651 m)   Wt 68 8 kg (151 lb 10 8 oz)   SpO2 99%   BMI 25 24 kg/m²     General Appearance:    Patient was well nourished, well developed, appropriately dressed and in no acute distress  Musculoskeletal System:  muscle bulk and tone were grossly normal    Neurological: [normal]  Gait and Stance: [Not observed]    Psychiatric: Mental Status Examination:     APPEARANCE: This patient was [cooperative & pleasant] and in no acute distress  EYE CONTACT: Patient made [good] eye contact  SPEECH: The patient spoke with a [normal] rate, rhythm, inflection and tone  PSYCHOMOTOR activity: [normal]  MOOD: Patient's mood was [dysphoric], [i e  depressed, irritable and anxious] with a [constricted] range of affect that was mood congruent and appropriate     THOUGHT PROCESS: Patient's thought processes are [logical, linear and goal directed without any flight of ideas or loosening of associations]  THOUGHT CONTENT: The patient's thought content showed [no] active or current suicidal ideation, [no] active homicidal ideations,  active auditory hallucinations and [no] active visual hallucinations  There were [no] morbid ideations  There were [no] paranoid delusions and [no ] phobias  COGNITION: Alert and oriented in 3 spheres  ATTENTION SPAN: [Unimpaired]  MEMORY: There were [no] short-term or long-term memory deficits  JUDGMENT: [Adequate]  INSIGHT: [Adequate]  INTELLECT: Patient's intellect is grossly [above average]  INTERACTIONS WITH OTHERS: Patient's interactions with others were [appropriate]  IMPULSE CONTROL: Patient's impulse control was [Intact]  Laboratory /Radiological Studies:  Lab data records were reviewed  Results Reviewed     Procedure Component Value Units Date/Time    Blood culture #1 [[id number]] Collected: 09/23/21 0510    Lab Status: Preliminary result Specimen: Blood from Arm, Left Updated: 09/25/21 1001     Blood Culture No Growth at 48 hrs  Blood culture #2 [[id number]] Collected: 09/23/21 0510    Lab Status: Preliminary result Specimen: Blood from Arm, Right Updated: 09/25/21 1001     Blood Culture No Growth at 48 hrs      Urine culture [[id number]]  (Abnormal)  (Susceptibility) Collected: 09/23/21 0452    Lab Status: Final result Specimen: Urine, Clean Catch Updated: 09/25/21 0708     Urine Culture >100,000 cfu/ml Pseudomonas aeruginosa    Susceptibility     Pseudomonas aeruginosa (1)     Antibiotic Interpretation Microscan Method Status    Aztreonam ($$$)  Susceptible <=4 ug/ml BECK Final    Cefepime ($) Susceptible 8 00 ug/ml BECK Final    Ceftazidime ($$) Susceptible <=1 ug/ml BECK Final    Ciprofloxacin ($)  Intermediate 2 00 ug/ml BECK Final    Gentamicin ($$) Susceptible 4 ug/ml BECK Final    Imipenem Susceptible <=1 ug/ml BECK Final    Levofloxacin ($) Intermediate 4 00 ug/ml BECK Final    Meropenem ($$) Susceptible <=1 00 ug/ml BECK Final    Piperacillin + Tazobactam ($$$) Susceptible <=4 ug/ml BECK Final    Tobramycin ($) Susceptible <=1 ug/ml BECK Final                   Magnesium [[id number]]  (Normal) Collected: 09/24/21 0434    Lab Status: Final result Specimen: Blood from Arm, Right Updated: 09/24/21 1240     Magnesium 1 7 mg/dL     Basic metabolic panel [[id number]]  (Abnormal) Collected: 09/24/21 0434    Lab Status: Final result Specimen: Blood from Arm, Right Updated: 09/24/21 0541     Sodium 141 mmol/L      Potassium 2 9 mmol/L      Chloride 105 mmol/L      CO2 27 mmol/L      ANION GAP 9 mmol/L      BUN 18 mg/dL      Creatinine 0 78 mg/dL      Glucose 101 mg/dL      Calcium 7 8 mg/dL      eGFR 82 ml/min/1 73sq m     Narrative:      [place] guidelines for Chronic Kidney Disease (CKD):     Stage 1 with normal or high GFR (GFR > 90 mL/min/1 73 square meters)    Stage 2 Mild CKD (GFR = 60-89 mL/min/1 73 square meters)    Stage 3A Moderate CKD (GFR = 45-59 mL/min/1 73 square meters)    Stage 3B Moderate CKD (GFR = 30-44 mL/min/1 73 square meters)    Stage 4 Severe CKD (GFR = 15-29 mL/min/1 73 square meters)    Stage 5 End Stage CKD (GFR <15 mL/min/1 73 square meters)  Note: GFR calculation is accurate only with a steady state creatinine    CBC and differential [[id number]]  (Abnormal) Collected: 09/24/21 0434    Lab Status: Final result Specimen: Blood from Arm, Right Updated: 09/24/21 0519     WBC 4 64 Thousand/uL      RBC 2 82 Million/uL      Hemoglobin 9 4 g/dL      Hematocrit 26 8 %      MCV 95 fL      MCH 33 3 pg      MCHC 35 1 g/dL      RDW 15 7 %      MPV 10 5 fL      Platelets 884 Thousands/uL      nRBC 0 /100 WBCs      Neutrophils Relative 71 %      Immat GRANS % 1 %      Lymphocytes Relative 19 %      Monocytes Relative 9 %      Eosinophils Relative 0 %      Basophils Relative 0 %      Neutrophils Absolute 3 26 Thousands/µL      Immature Grans Absolute 0 04 Thousand/uL Lymphocytes Absolute 0 90 Thousands/µL      Monocytes Absolute 0 43 Thousand/µL      Eosinophils Absolute 0 00 Thousand/µL      Basophils Absolute 0 01 Thousands/µL     Lactic acid 2 Hours [[id number]]  (Normal) Collected: 09/24/21 0138    Lab Status: Final result Specimen: Blood from Arm, Right Updated: 09/24/21 0208     LACTIC ACID 1 7 mmol/L     Narrative:      Result may be elevated if tourniquet was used during collection  Lactic acid, plasma [[id number]]  (Abnormal) Collected: 09/23/21 2330    Lab Status: Final result Specimen: Blood from Arm, Right Updated: 09/24/21 0030     LACTIC ACID 2 1 mmol/L     Narrative:      Result may be elevated if tourniquet was used during collection  Lactic acid 2 Hours [[id number]]  (Abnormal) Collected: 09/23/21 2142    Lab Status: Final result Specimen: Blood from Arm, Right Updated: 09/23/21 2219     LACTIC ACID 2 3 mmol/L     Narrative:      Result may be elevated if tourniquet was used during collection  Lactic acid, plasma [[id number]]  (Abnormal) Collected: 09/23/21 1857    Lab Status: Final result Specimen: Blood from Arm, Right Updated: 09/23/21 2010     LACTIC ACID 3 2 mmol/L     Narrative:      Result may be elevated if tourniquet was used during collection  Lactic acid 2 Hours [[id number]]  (Abnormal) Collected: 09/23/21 1514    Lab Status: Final result Specimen: Blood from Arm, Right Updated: 09/23/21 1557     LACTIC ACID 3 7 mmol/L     Narrative:      Result may be elevated if tourniquet was used during collection  Lactic acid, plasma [[id number]]  (Abnormal) Collected: 09/23/21 1223    Lab Status: Final result Specimen: Blood from Arm, Right Updated: 09/23/21 1313     LACTIC ACID 4 6 mmol/L     Narrative:      Result may be elevated if tourniquet was used during collection      Lactic acid, plasma [[id number]]  (Abnormal) Collected: 09/23/21 1039    Lab Status: Final result Specimen: Blood from Arm, Right Updated: 09/23/21 1130     LACTIC ACID 3 2 mmol/L Narrative:      Result may be elevated if tourniquet was used during collection  Potassium [[id number]]  (Normal) Collected: 09/23/21 1039    Lab Status: Final result Specimen: Blood from Arm, Right Updated: 09/23/21 1105     Potassium 3 7 mmol/L     Lactic acid 2 Hours [[id number]]  (Abnormal) Collected: 09/23/21 0905    Lab Status: Final result Specimen: Blood from Arm, Right Updated: 09/23/21 1010     LACTIC ACID 3 5 mmol/L     Narrative:      Result may be elevated if tourniquet was used during collection  Urine Microscopic [[id number]]  (Abnormal) Collected: 09/23/21 0452    Lab Status: Final result Specimen: Urine, Clean Catch Updated: 09/23/21 0614     RBC, UA 1-2 /hpf      WBC, UA 10-20 /hpf      Epithelial Cells Occasional /hpf      Bacteria, UA Occasional /hpf     Lactic acid [[id number]]  (Abnormal) Collected: 09/23/21 0510    Lab Status: Final result Specimen: Blood from Arm, Right Updated: 09/23/21 0553     LACTIC ACID 3 8 mmol/L     Narrative:      Result may be elevated if tourniquet was used during collection  Rapid drug screen, urine [[id number]]  (Normal) Collected: 09/23/21 0450    Lab Status: Final result Specimen: Urine, Clean Catch Updated: 09/23/21 0513     Amph/Meth UR Negative     Barbiturate Ur Negative     Benzodiazepine Urine Negative     Cocaine Urine Negative     Methadone Urine Negative     Opiate Urine Negative     PCP Ur Negative     THC Urine Negative     Oxycodone Urine Negative    Narrative:      FOR MEDICAL PURPOSES ONLY  IF CONFIRMATION NEEDED PLEASE CONTACT THE LAB WITHIN 5 DAYS      Drug Screen Cutoff Levels:  AMPHETAMINE/METHAMPHETAMINES  1000 ng/mL  BARBITURATES     200 ng/mL  BENZODIAZEPINES     200 ng/mL  COCAINE      300 ng/mL  METHADONE      300 ng/mL  OPIATES      300 ng/mL  PHENCYCLIDINE     25 ng/mL  THC       50 ng/mL  OXYCODONE      100 ng/mL    Urine Macroscopic, POC [[id number]]  (Abnormal) Collected: 09/23/21 0452    Lab Status: Final result Specimen: Urine Updated: 09/23/21 0453     Color, UA Yellow     Clarity, UA Clear     pH, UA 7 5     Leukocytes, UA Small     Nitrite, UA Positive     Protein, UA Trace mg/dl      Glucose, UA Negative mg/dl      Ketones, UA Trace mg/dl      Urobilinogen, UA 0 2 E U /dl      Bilirubin, UA Negative     Blood, UA Small     Specific [place], UA 1 020    Narrative:      CLINITEK RESULT    Troponin I [[id number]]  (Abnormal) Collected: 09/23/21 0339    Lab Status: Final result Specimen: Blood from Arm, Right Updated: 09/23/21 0424     Troponin I 0 08 ng/mL     TSH, 3rd generation with Free T4 reflex [[id number]]  (Normal) Collected: 09/23/21 0339    Lab Status: Final result Specimen: Blood from Arm, Right Updated: 09/23/21 0417     TSH 3RD GENERATON 2 883 uIU/mL     Narrative:      Patients undergoing fluorescein dye angiography may retain small amounts of fluorescein in the body for 48-72 hours post procedure  Samples containing fluorescein can produce falsely depressed TSH values  If the patient had this procedure,a specimen should be resubmitted post fluorescein clearance  Salicylate level [[id number]]  (Abnormal) Collected: 09/23/21 0339    Lab Status: Final result Specimen: Blood from Arm, Right Updated: 77/45/60 0904     Salicylate Lvl <3 mg/dL     Acetaminophen level-If concentration is detectable, please discuss with medical  on call   [[id number]]  (Abnormal) Collected: 09/23/21 0339    Lab Status: Final result Specimen: Blood from Arm, Right Updated: 09/23/21 0417     Acetaminophen Level <2 ug/mL     Ethanol [[id number]]  (Normal) Collected: 09/23/21 0339    Lab Status: Final result Specimen: Blood from Arm, Right Updated: 09/23/21 0411     Ethanol Lvl <3 mg/dL     Comprehensive metabolic panel [[id number]]  (Abnormal) Collected: 09/23/21 0339    Lab Status: Final result Specimen: Blood from Arm, Right Updated: 09/23/21 0408     Sodium 142 mmol/L      Potassium 3 2 mmol/L      Chloride 102 mmol/L      CO2 32 mmol/L      ANION GAP 8 mmol/L      BUN 25 mg/dL      Creatinine 1 02 mg/dL      Glucose 202 mg/dL      Calcium 8 5 mg/dL      Corrected Calcium 9 3 mg/dL      AST 38 U/L      ALT 91 U/L      Alkaline Phosphatase 101 U/L      Total Protein 5 6 g/dL      Albumin 3 0 g/dL      Total Bilirubin 1 70 mg/dL      eGFR 66 ml/min/1 73sq m     Narrative:      [employer] guidelines for Chronic Kidney Disease (CKD):     Stage 1 with normal or high GFR (GFR > 90 mL/min/1 73 square meters)    Stage 2 Mild CKD (GFR = 60-89 mL/min/1 73 square meters)    Stage 3A Moderate CKD (GFR = 45-59 mL/min/1 73 square meters)    Stage 3B Moderate CKD (GFR = 30-44 mL/min/1 73 square meters)    Stage 4 Severe CKD (GFR = 15-29 mL/min/1 73 square meters)    Stage 5 End Stage CKD (GFR <15 mL/min/1 73 square meters)  Note: GFR calculation is accurate only with a steady state creatinine    Protime-INR [[id number]]  (Normal) Collected: 09/23/21 0339    Lab Status: Final result Specimen: Blood from Arm, Right Updated: 09/23/21 0403     Protime 14 4 seconds      INR 1 12    APTT [[id number]]  (Normal) Collected: 09/23/21 0339    Lab Status: Final result Specimen: Blood from Arm, Right Updated: 09/23/21 0403     PTT 26 seconds     CBC and differential [[id number]]  (Abnormal) Collected: 09/23/21 0339    Lab Status: Final result Specimen: Blood from Arm, Right Updated: 09/23/21 0352     WBC 7 38 Thousand/uL      RBC 4 02 Million/uL      Hemoglobin 13 1 g/dL      Hematocrit 38 8 %      MCV 97 fL      MCH 32 6 pg      MCHC 33 8 g/dL      RDW 15 4 %      MPV 10 0 fL      Platelets 210 Thousands/uL      nRBC 0 /100 WBCs      Neutrophils Relative 79 %      Immat GRANS % 1 %      Lymphocytes Relative 13 %      Monocytes Relative 7 %      Eosinophils Relative 0 %      Basophils Relative 0 %      Neutrophils Absolute 5 88 Thousands/µL      Immature Grans Absolute 0 05 Thousand/uL      Lymphocytes Absolute 0 94 Thousands/µL Monocytes Absolute 0 49 Thousand/µL      Eosinophils Absolute 0 00 Thousand/µL      Basophils Absolute 0 02 Thousands/µL

## 2021-09-25 NOTE — ASSESSMENT & PLAN NOTE
· Patient reported that he lost himself and tried a suicide attempt at nighttime around 1:00 a m  On 9/23  He reported that he had depression  · He used razors and blades to cut both wrists and feet  His family found out shortly after his suicide attempt  Moderate amount of blood loss reported by patient  · Trauma team suture his lacerations  · X-ray wrist and feet for both right and left did not show any fracture or any abnormality  · Urine toxicology negative    Plans  · Psych Consulted, appreciate recommendations  201 signed  Case management was working on inpatient psych placement but yesterday patient said he feels fine and wants to go home  Today patient said he feels confused  And wants to go home  · Repeat psych evaluation pending    · One on one observation

## 2021-09-25 NOTE — ASSESSMENT & PLAN NOTE
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  · He is currently taking hydrocortisone 10 mg t i d  and Keppra  · Had a lengthy discussion with patient's son and daughter at bedside today  They feel all of the patient's mood symptoms started after he was started on Keppra and hydrocortisone  They look to the side effects of Keppra and found out that patient has all the side effects from med  They also feel that patient's current suicidal attempt is also related to that  They would like patient to be weaned off these medications  · Will decrease Keppra to 250 mg daily  · Will decrease hydrocortisone to 10 mg b i d  Natalie Moeller · Patient family understand the risk all of seizures and worsening intracranial edema from metastatic disease with stopping these medications  Plan at this time, they feel that medications or harming more than helping him  · Recommended that they discuss with patient's oncologist and PCP after discharge

## 2021-09-25 NOTE — PROGRESS NOTES
[facility]  Progress Note Gómez Staff Saad 1934, 80 y o  male MRN: [id number]  Unit/Bed#: S -01 Encounter: [id number]  Primary Care Provider: Jayden Davis MD   Date and time admitted to hospital: 9/23/2021  2:39 AM    * Suicide attempt [facility])  Assessment & Plan  · Patient reported that he lost himself and tried a suicide attempt at nighttime around 1:00 a m  On 9/23  He reported that he had depression  · He used razors and blades to cut both wrists and feet  His family found out shortly after his suicide attempt  Moderate amount of blood loss reported by patient  · Trauma team suture his lacerations  · X-ray wrist and feet for both right and left did not show any fracture or any abnormality  · Urine toxicology negative    Plans  · Psych Consulted, appreciate recommendations  201 signed  Case management was working on inpatient psych placement but yesterday patient said he feels fine and wants to go home  Today patient said he feels confused  And wants to go home  · Repeat psych evaluation pending  · One on one observation      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  Assessment & Plan  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  · He is currently taking hydrocortisone 10 mg t i d  and Keppra  · Had a lengthy discussion with patient's son and daughter at bedside today  They feel all of the patient's mood symptoms started after he was started on Keppra and hydrocortisone  They look to the side effects of Keppra and found out that patient has all the side effects from med  They also feel that patient's current suicidal attempt is also related to that  They would like patient to be weaned off these medications  · Will decrease Keppra to 250 mg daily  · Will decrease hydrocortisone to 10 mg b i d  Mindy Lightning   · Patient family understand the risk all of seizures and worsening intracranial edema from metastatic disease with stopping these medications  Plan at this time, they feel that medications or harming more than helping him  · Recommended that they discuss with patient's oncologist and PCP after discharge  Lymphedema of both lower extremities  Assessment & Plan  · Patient complains of bilateral leg heaviness for 2 weeks  · Compression stockings      Acute kidney injury (TENZIN)   Assessment & Plan  · Patient with no history of CKD, baseline creatinine 0 5-0 6  · Resolved post IV hydration        VTE Pharmacologic Prophylaxis:   Pharmacologic: Heparin  Mechanical VTE Prophylaxis in Place: Yes    Patient Centered Rounds: I have performed bedside rounds with nursing staff today  Discussions with Specialists or Other Care Team Provider:     Education and Discussions with Family / Patient: as above    Time Spent for Care: 30 minutes  More than 50% of total time spent on counseling and coordination of care as described above  Current Length of Stay: 1 day(s)    Current Patient Status: Inpatient   Certification Statement: The patient will continue to require additional inpatient hospital stay due to above    Discharge Plan: pending repeat psych eval    Code Status: Level 3 - DNAR and DNI      Subjective:   Pt seen and examined by me this morning  Pt said he still feels confused  Wants to go home    Objective:     Vitals:   Temp (24hrs), Av °F (36 7 °C), Min:97 7 °F (36 5 °C), Max:98 5 °F (36 9 °C)    Temp:  [97 7 °F (36 5 °C)-98 5 °F (36 9 °C)] 97 7 °F (36 5 °C)  HR:  [64-86] 86  Resp:  [16-18] 18  BP: (136-165)/(68-77) 152/68  SpO2:  [95 %-99 %] 99 %  Body mass index is 25 24 kg/m²  Input and Output Summary (last 24 hours): Intake/Output Summary (Last 24 hours) at 2021 1444  Last data filed at 2021 0500  Gross per 24 hour   Intake 0 ml   Output 1200 ml   Net -1200 ml       Physical Exam:     Physical Exam    Constitutional: Pt appears comfortable  Not in any acute distress    Cardiovascular: Normal rate, regular rhythm, normal heart sounds  No murmur heard  Pulmonary/Chest: Effort normal, air entry b/l equal  No respiratory distress  Pt has no wheezes or crackles  Abdominal: Soft  Non-distended, Non-tender  Bowel sounds are normal    Neurological: awake, alert  Moving all extremities spontaneously  Psychiatric: normal mood and affect  Additional Data:     Labs:    Results from last 7 days   Lab Units 09/25/21  0443 09/24/21  0434   WBC Thousand/uL 5 17 4 64   HEMOGLOBIN g/dL 9 2* 9 4*   HEMATOCRIT % 26 9* 26 8*   PLATELETS Thousands/uL 118* 124*   NEUTROS PCT %  --  71   LYMPHS PCT %  --  19   MONOS PCT %  --  9   EOS PCT %  --  0     Results from last 7 days   Lab Units 09/25/21  0443 09/23/21  1039 09/23/21  0339   SODIUM mmol/L 138   < > 142   POTASSIUM mmol/L 3 2*  --  3 2*   CHLORIDE mmol/L 105   < > 102   CO2 mmol/L 24   < > 32   BUN mg/dL 16   < > 25   CREATININE mg/dL 0 65   < > 1 02   ANION GAP mmol/L 9   < > 8   CALCIUM mg/dL 7 8*   < > 8 5   ALBUMIN g/dL  --   --  3 0*   TOTAL BILIRUBIN mg/dL  --   --  1 70*   ALK PHOS U/L  --   --  101   ALT U/L  --   --  91*   AST U/L  --   --  38   GLUCOSE RANDOM mg/dL 97   < > 202*    < > = values in this interval not displayed  Results from last 7 days   Lab Units 09/23/21  0339   INR  1 12             Results from last 7 days   Lab Units 09/24/21  0138 09/23/21  2330 09/23/21  2142 09/23/21  1857   LACTIC ACID mmol/L 1 7 2 1* 2 3* 3 2*           * I Have Reviewed All Lab Data Listed Above  * Additional Pertinent Lab Tests Reviewed: Roberto 66 Admission Reviewed    Imaging:    Imaging Reports Reviewed Today Include:   Imaging Personally Reviewed by Myself Includes:     Recent Cultures (last 7 days):     Results from last 7 days   Lab Units 09/23/21  0510 09/23/21  0452   BLOOD CULTURE  No Growth at 48 hrs  No Growth at 48 hrs    --    URINE CULTURE   --  >100,000 cfu/ml Pseudomonas aeruginosa*       Last 24 Hours Medication List: Current Facility-Administered Medications   Medication Dose Route Frequency Provider Last Rate    aspirin  81 mg Oral Daily May Samaria Puentes MD      finasteride  5 mg Oral Daily May Thu Sonam Snider MD      heparin (porcine)  5,000 Units Subcutaneous [facility] [address] May Samaria Puentes MD      hydrocortisone  10 mg Oral BID Lea Srivastava MD     [employer] [START ON 9/26/2021] levETIRAcetam  250 mg Oral Daily Lea Srivastava MD      magnesium oxide  400 mg Oral BID Lea Srivastava MD      melatonin  6 mg Oral HS Milad Coombs MD      metoprolol  5 mg Intravenous Q6H PRN Milad Coombs MD      polyethylene glycol  17 g Oral Daily PRN Cherelle Cota MD          Today, Patient Was Seen By: Lea Srivastava MD    ** Please Note: Dictation voice to text software may have been used in the creation of this document   **

## 2021-09-26 NOTE — DISCHARGE SUMMARY
[facility] [place] [facility] [place] [facility] [place] 1934[place] 80 y o  [place] MRN: [id number]  Unit/Bed#: S -01 Encounter: [id number]  Primary Care Provider: Branden Humphries MD   Date and time admitted to hospital: 9/23/2021  2:39 AM    * Suicide attempt [facility])  Assessment & Plan  · Patient reported that he lost himself and tried a suicide attempt at nighttime around 1:00 a m  On 9/23  He reported that he had depression  · He used razors and blades to cut both wrists and feet  His family found out shortly after his suicide attempt  Moderate amount of blood loss reported by patient  · Trauma team suture his lacerations  · X-ray wrist and feet for both right and left did not show any fracture or any abnormality  · Urine toxicology negative    Plans  · Psych Consulted, appreciate recommendations  201 signed  Case management was working on inpatient psych placement  Subsequently patient said he feels better and wants to go home therefore psychiatry evaluated the patient and recommended that he can go home and he does not need inpatient psych criteria anymore as his symptoms were related to [address] rather than to Psychiatry problem    Lymphedema of both lower extremities  Assessment & Plan  · Patient complains of bilateral leg heaviness for 2 weeks  · Compression stockings      Acute kidney injury (TENZIN)   Assessment & Plan  · Patient with no history of CKD, baseline creatinine 0 5-0 6  · Resolved post IV hydration    Hypokalemia  Assessment & Plan  Likely secondary to poor oral intake  Repleted, monitor and replete as needed    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  Assessment & Plan  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  · He is currently taking hydrocortisone 10 mg t i d  and Keppra  · Had a lengthy discussion with patient's son and daughter at bedside today    They feel all of the patient's mood symptoms started after he was started on Keppra and hydrocortisone  They look to the side effects of Keppra and found out that patient has all the side effects from med  They also feel that patient's current suicidal attempt is also related to that  They would like patient to be weaned off these medications  · decreased Keppra to 250 mg daily and stop after 2 days  · decreased hydrocortisone to 10 mg b i d  With PCP follow-up for tapering  · Patient family understand the risk all of seizures and worsening intracranial edema from metastatic disease with stopping these medications  Plan at this time, they feel that medications or harming more than helping him  · Recommended that they discuss with patient's oncologist and PCP after discharge  Discharging Resident Physician: Isidro Evans MD  Attending: Pa Fair MD  PCP: Leopold Garibaldi, MD  Admission Date: 9/23/2021  Discharge Date: 09/26/21    Disposition:     Home    Reason for Admission:  Suicidal attempt    Consultations During Hospital Stay:  · Psychiatry    Procedures Performed:     · None    Significant Findings / Test Results:   XR chest 1 view portable    Result Date: 9/23/2021  Impression: Right upper lobe mass consistent with patient's known history of lung cancer  Workstation performed: [id number]     XR wrist 3+ views LEFT    Result Date: 9/23/2021  Impression: No acute osseous abnormality  Workstation performed: [id number]     XR wrist 3+ views RIGHT    Result Date: 9/23/2021  Impression: No acute osseous abnormality  Workstation performed: [id number]     XR foot 3+ views LEFT    Result Date: 9/23/2021  Impression: No acute osseous abnormality  Degenerative changes as described  Workstation performed: [id number]     XR foot 3+ views RIGHT    Result Date: 9/23/2021  Impression: No acute osseous abnormality  Workstation performed: [id number]     Incidental Findings:   · None     Test Results Pending at Discharge (will require follow up):    · None Outpatient Tests Requested:  · None    Complications:  None    Hospital Course:     Angela Cole is a 80 y o  male patient who originally presented to the hospital on 9/23/2021 due to suicidal attempt as patient cut both of his wrists because of feeling depressed  Wounds were sutured  X-rays were negative for any injuries  No signs of infection noted and patient remained afebrile with normal white count  Psychiatry evaluated the patient and initially recommended inpatient psych and case management was on board for placement however patient reported that he feels better and would like to go home  After discussion with patient's family, they reported that patient's condition worsened since he started Keppra  Psychiatry evaluated the patient again and thought that patient's current symptom was related to [address] rather than psychiatry issue therefore patient did not meet inpatient psych criteria anymore and Psychiatry recommended that patient is stable to discharge home with discontinuing Keppra and PCP follow-up  Keppra was tapered down and who recommended to discontinue after 2 days with PCP follow-up in 1 week  In regards to patient's wrist sutures, recommendation was given to patient's family to follow-up with primary care doctor or come back to the emergency department for suture removals in 1 week  Patient is medically stable for discharge today  Condition at Discharge: stable     Discharge Day Visit / Exam:     Subjective:  Patient denies any complaints  He states that he wants to go home  Vitals: Blood Pressure: 129/79 (09/26/21 0700)  Pulse: 84 (09/26/21 0700)  Temperature: 98 2 °F (36 8 °C) (09/26/21 0700)  Temp Source: Oral (09/26/21 0700)  Respirations: 16 (09/26/21 0700)  Height: 5' 5" (165 1 cm) (09/24/21 1356)  Weight - Scale: 68 8 kg (151 lb 10 8 oz) (09/24/21 1356)  SpO2: 96 % (09/25/21 2100)  Exam:   Physical Exam  Vitals and nursing note reviewed     Constitutional:       General: He is not in acute distress  Appearance: He is not ill-appearing or diaphoretic  HENT:      Head: Normocephalic and atraumatic  Mouth/Throat:      Mouth: Mucous membranes are moist       Pharynx: Oropharynx is clear  Eyes:      General: No scleral icterus  Extraocular Movements: Extraocular movements intact  Conjunctiva/sclera: Conjunctivae normal    Cardiovascular:      Rate and Rhythm: Normal rate and regular rhythm  Pulmonary:      Effort: Pulmonary effort is normal  No respiratory distress  Breath sounds: Normal breath sounds  No wheezing or rales  Abdominal:      General: Bowel sounds are normal       Palpations: Abdomen is soft  Tenderness: There is no abdominal tenderness  Musculoskeletal:         General: Normal range of motion  Cervical back: Normal range of motion and neck supple  Right lower leg: Edema present  Left lower leg: Edema present  Skin:     General: Skin is warm  Capillary Refill: Capillary refill takes less than 2 seconds  Neurological:      General: No focal deficit present  Mental Status: He is alert and oriented to person, place, and time  Psychiatric:         Mood and Affect: Mood normal          Behavior: Behavior normal          Discussion with Family:  Discussed with patient's son over the phone and all questions answered    Discharge instructions/Information to patient and family:   See after visit summary for information provided to patient and family  Provisions for Follow-Up Care:  See after visit summary for information related to follow-up care and any pertinent home health orders  Planned Readmission:  None     Discharge Medications:  See after visit summary for reconciled discharge medications provided to patient and family        ** Please Note: This note has been constructed using a voice recognition system **

## 2021-09-26 NOTE — ASSESSMENT & PLAN NOTE
· Patient reported that he lost himself and tried a suicide attempt at nighttime around 1:00 a m  On 9/23  He reported that he had depression  · He used razors and blades to cut both wrists and feet  His family found out shortly after his suicide attempt  Moderate amount of blood loss reported by patient  · Trauma team suture his lacerations  · X-ray wrist and feet for both right and left did not show any fracture or any abnormality  · Urine toxicology negative    Plans  · Psych Consulted, appreciate recommendations  201 signed  Case management was working on inpatient psych placement    Subsequently patient said he feels better and wants to go home therefore psychiatry evaluated the patient and recommended that he can go home and he does not need inpatient psych criteria anymore as his symptoms were related to [address] rather than to Psychiatry problem

## 2021-09-26 NOTE — DISCHARGE INSTR - AVS FIRST PAGE
Dear Enmanuel Dawson,     It was our pleasure to care for you here at [facility]  It is our hope that we were always able to exceed the expected standards for your care during your stay  You were hospitalized due to ***  You were cared for on the 3rd floor by Sola Vieira MD under the service of Cuauhtemoc Gardner MD with the [facility] who covers for your primary care physician (PCP), Praveena Han MD, while you were hospitalized  If you have any questions or concerns related to this hospitalization, you may contact us at [phone]  For follow up as well as any medication refills, we recommend that you follow up with your primary care physician  A registered nurse will reach out to you by phone within a few days after your discharge to answer any additional questions that you may have after going home  However, at this time we provide for you here, the most important instructions / recommendations at discharge:     · Notable Medication Adjustments -   · Please stop Keppra, last dose is tomorrow  · Hydrocortisone decreased to 10 mg twice a day, follow-up with primary care doctor for further instructions about discontinue/taper  · Testing Required after Discharge -   · None  · Important follow up information -   · Follow-up with primary care doctor within 1 week  · Other Instructions -   · Follow-up with primary care doctor or emergency department in 1 week for sutures removal  · Please review this entire after visit summary as additional general instructions including medication list, appointments, activity, diet, any pertinent wound care, and other additional recommendations from your care team that may be provided for you        Sincerely,     Sola Vieira MD

## 2021-10-02 PROBLEM — S41.112A LACERATIONS OF MULTIPLE SITES OF LEFT ARM: Status: ACTIVE | Noted: 2021-01-01

## 2021-10-02 PROBLEM — R19.7 DIARRHEA: Status: ACTIVE | Noted: 2021-01-01

## 2021-10-02 PROBLEM — Z91.51 HISTORY OF SUICIDE ATTEMPT: Status: ACTIVE | Noted: 2021-01-01

## 2021-10-04 PROBLEM — E43 SEVERE PROTEIN-CALORIE MALNUTRITION (HCC): Status: ACTIVE | Noted: 2021-01-01

## 2021-10-04 PROBLEM — L98.429 SACRAL ULCER (HCC): Status: ACTIVE | Noted: 2021-01-01

## (undated) DEVICE — FLUID MANAGEMENT KIT - IR

## (undated) DEVICE — 4 F TEMPO AQUA 0.038  65CM VER: Brand: TEMPO AQUA

## (undated) DEVICE — TUBING INJECTOR HIGH PRESSURE 91051482

## (undated) DEVICE — RADIOLOGY STERILE LABELS: Brand: CENTURION

## (undated) DEVICE — RADIFOCUS GLIDEWIRE: Brand: GLIDEWIRE

## (undated) DEVICE — Device

## (undated) DEVICE — LUBRICANT SURGILUBE TUBE 4 OZ  FLIP TOP

## (undated) DEVICE — CATH FOLEY HEMATURIA 24FR 30ML 3 WAY LUBRICATH

## (undated) DEVICE — FLEXCIL HIGH PRESSURE CONTRAST INJECTION LINE: Brand: NAMIC

## (undated) DEVICE — SNAP KOVER: Brand: UNBRANDED

## (undated) DEVICE — GUIDEWIRE STRGHT TIP 0.035 IN  SOLO PLUS

## (undated) DEVICE — INFUSER BAG 500ML

## (undated) DEVICE — COVER PROBE INTRAOPERATIVE 6 X 96 IN

## (undated) DEVICE — EVACUATOR BLADDER ELLIK DISP STRL

## (undated) DEVICE — PINNACLE R/O II INTRODUCER SHEATH WITH RADIOPAQUE MARKER: Brand: PINNACLE

## (undated) DEVICE — GLOVE SRG BIOGEL 7.5

## (undated) DEVICE — Device: Brand: OLYMPUS

## (undated) DEVICE — GLOVE SRG BIOGEL ECLIPSE 6

## (undated) DEVICE — 3M™ TEGADERM™ TRANSPARENT FILM DRESSING FRAME STYLE, 1626W, 4 IN X 4-3/4 IN (10 CM X 12 CM), 50/CT 4CT/CASE: Brand: 3M™ TEGADERM™

## (undated) DEVICE — CATH BAL STERLING OTW 5 X 220MM 150CM

## (undated) DEVICE — CATH BAL CHARGER 5 X 200MM X 135CM

## (undated) DEVICE — CYSTO TUBING TUR Y IRRIGATION

## (undated) DEVICE — STERILE ICS CARDIOVASCULAR PK: Brand: CARDINAL HEALTH

## (undated) DEVICE — PERCUTANEOUS ENTRY THINWALL NEEDLE  ONE-PART: Brand: COOK

## (undated) DEVICE — CUTTING LOOP, BIPOLAR, 24/26 FR.: Brand: N.A.

## (undated) DEVICE — SYRINGE KIT,PACKAGED,,150FT,MK 7(ANGIO-ARTERION, 150ML SYR KIT W/QFT,MC)(60729385): Brand: MEDRAD® MARK 7 ARTERION DISPOSABLE SYRINGE 150 ML WITH QUICK FILL TUBE

## (undated) DEVICE — GROUNDING PAD UNIVERSAL SLW

## (undated) DEVICE — CYSTOSCOPY PACK: Brand: CONVERTORS

## (undated) DEVICE — GLOVE INDICATOR PI UNDERGLOVE SZ 8 BLUE

## (undated) DEVICE — GAUZE SPONGES,USP TYPE VII GAUZE, 12 PLY: Brand: CURITY

## (undated) DEVICE — GAUZE SPONGES,16 PLY: Brand: CURITY

## (undated) DEVICE — CATH BAL CHARGER 6 X 60MM X 75CM

## (undated) DEVICE — PINNACLE INTRODUCER SHEATH: Brand: PINNACLE

## (undated) DEVICE — PRESTO™ INFLATION DEVICE: Brand: PRESTO

## (undated) DEVICE — GLOVE SRG BIOGEL ECLIPSE 7.5

## (undated) DEVICE — RADIFOCUS TORQUE DEVICE MULTI-TORQUE VISE: Brand: RADIFOCUS TORQUE DEVICE

## (undated) DEVICE — CATH BAL CHARGER 4 X 100MM X 135CM

## (undated) DEVICE — GUIDEWIRE VASC .018 150CM 8CM TAP V-18

## (undated) DEVICE — BAG URINE DRAINAGE 4000ML CONTINUOUS IRR

## (undated) DEVICE — BASIC SINGLE BASIN 2-LF: Brand: MEDLINE INDUSTRIES, INC.

## (undated) DEVICE — STRAP FOLEY CATH LEG 1545 9

## (undated) DEVICE — POUCH UR CATCHER STERILE

## (undated) DEVICE — CATH DIAG 5FR .035 65CM 6S OMMI-FLUSH

## (undated) DEVICE — DESTINATION PERIPHERAL GUIDING SHEATH: Brand: DESTINATION

## (undated) DEVICE — CATH TEMPO AQUA 4FVER135Â°100CM: Brand: TEMPO AQUA

## (undated) DEVICE — SGW STORQ .035 300CM ANGLE STD: Brand: STORQ

## (undated) DEVICE — PACK TUR

## (undated) DEVICE — POV-IOD SOLUTION 4OZ BT

## (undated) DEVICE — GUIDEWIRE WITH ICE™ HYDROPHILIC COATING: Brand: V-18™ CONTROL WIRE™

## (undated) DEVICE — SPECIMEN CONTAINER STERILE PEEL PACK

## (undated) DEVICE — CATH BAL CHARGER 5 X 20MM X 75CM